# Patient Record
Sex: FEMALE | Race: WHITE | NOT HISPANIC OR LATINO | Employment: UNEMPLOYED | URBAN - METROPOLITAN AREA
[De-identification: names, ages, dates, MRNs, and addresses within clinical notes are randomized per-mention and may not be internally consistent; named-entity substitution may affect disease eponyms.]

---

## 2017-01-11 ENCOUNTER — APPOINTMENT (OUTPATIENT)
Dept: LAB | Facility: CLINIC | Age: 57
End: 2017-01-11
Payer: COMMERCIAL

## 2017-01-11 DIAGNOSIS — E78.5 HYPERLIPIDEMIA: ICD-10-CM

## 2017-01-11 DIAGNOSIS — E55.9 VITAMIN D DEFICIENCY: ICD-10-CM

## 2017-01-11 LAB
25(OH)D3 SERPL-MCNC: 31 NG/ML (ref 30–100)
ALBUMIN SERPL BCP-MCNC: 3.5 G/DL (ref 3.5–5)
ALP SERPL-CCNC: 88 U/L (ref 46–116)
ALT SERPL W P-5'-P-CCNC: 42 U/L (ref 12–78)
ANION GAP SERPL CALCULATED.3IONS-SCNC: 7 MMOL/L (ref 4–13)
AST SERPL W P-5'-P-CCNC: 21 U/L (ref 5–45)
BILIRUB SERPL-MCNC: 0.3 MG/DL (ref 0.2–1)
BUN SERPL-MCNC: 18 MG/DL (ref 5–25)
CALCIUM SERPL-MCNC: 8.6 MG/DL (ref 8.3–10.1)
CHLORIDE SERPL-SCNC: 106 MMOL/L (ref 100–108)
CHOLEST SERPL-MCNC: 208 MG/DL (ref 50–200)
CO2 SERPL-SCNC: 29 MMOL/L (ref 21–32)
CREAT SERPL-MCNC: 0.68 MG/DL (ref 0.6–1.3)
GFR SERPL CREATININE-BSD FRML MDRD: >60 ML/MIN/1.73SQ M
GLUCOSE SERPL-MCNC: 97 MG/DL (ref 65–140)
HDLC SERPL-MCNC: 77 MG/DL (ref 40–60)
LDLC SERPL CALC-MCNC: 112 MG/DL (ref 0–100)
POTASSIUM SERPL-SCNC: 3.9 MMOL/L (ref 3.5–5.3)
PROT SERPL-MCNC: 7.4 G/DL (ref 6.4–8.2)
SODIUM SERPL-SCNC: 142 MMOL/L (ref 136–145)
TRIGL SERPL-MCNC: 97 MG/DL

## 2017-01-11 PROCEDURE — 80053 COMPREHEN METABOLIC PANEL: CPT

## 2017-01-11 PROCEDURE — 80061 LIPID PANEL: CPT

## 2017-01-11 PROCEDURE — 36415 COLL VENOUS BLD VENIPUNCTURE: CPT

## 2017-01-11 PROCEDURE — 82306 VITAMIN D 25 HYDROXY: CPT

## 2017-01-13 ENCOUNTER — GENERIC CONVERSION - ENCOUNTER (OUTPATIENT)
Dept: OTHER | Facility: OTHER | Age: 57
End: 2017-01-13

## 2017-02-23 DIAGNOSIS — Z12.31 ENCOUNTER FOR SCREENING MAMMOGRAM FOR MALIGNANT NEOPLASM OF BREAST: ICD-10-CM

## 2017-03-10 ENCOUNTER — HOSPITAL ENCOUNTER (OUTPATIENT)
Dept: RADIOLOGY | Age: 57
Discharge: HOME | End: 2017-03-10
Payer: COMMERCIAL

## 2017-03-10 DIAGNOSIS — Z12.31 ENCOUNTER FOR SCREENING MAMMOGRAM FOR MALIGNANT NEOPLASM OF BREAST: ICD-10-CM

## 2017-03-10 PROCEDURE — G0202 SCR MAMMO BI INCL CAD: HCPCS

## 2017-09-11 ENCOUNTER — ALLSCRIPTS OFFICE VISIT (OUTPATIENT)
Dept: OTHER | Facility: OTHER | Age: 57
End: 2017-09-11

## 2017-09-18 ENCOUNTER — GENERIC CONVERSION - ENCOUNTER (OUTPATIENT)
Dept: OTHER | Facility: OTHER | Age: 57
End: 2017-09-18

## 2017-10-16 ENCOUNTER — ALLSCRIPTS OFFICE VISIT (OUTPATIENT)
Dept: OTHER | Facility: OTHER | Age: 57
End: 2017-10-16

## 2017-12-01 DIAGNOSIS — I10 ESSENTIAL (PRIMARY) HYPERTENSION: ICD-10-CM

## 2017-12-01 DIAGNOSIS — E55.9 VITAMIN D DEFICIENCY: ICD-10-CM

## 2017-12-01 DIAGNOSIS — E78.5 HYPERLIPIDEMIA: ICD-10-CM

## 2017-12-11 ENCOUNTER — APPOINTMENT (OUTPATIENT)
Dept: LAB | Facility: CLINIC | Age: 57
End: 2017-12-11
Payer: COMMERCIAL

## 2017-12-11 ENCOUNTER — GENERIC CONVERSION - ENCOUNTER (OUTPATIENT)
Dept: OTHER | Facility: OTHER | Age: 57
End: 2017-12-11

## 2017-12-11 DIAGNOSIS — I10 ESSENTIAL (PRIMARY) HYPERTENSION: ICD-10-CM

## 2017-12-11 DIAGNOSIS — E55.9 VITAMIN D DEFICIENCY: ICD-10-CM

## 2017-12-11 DIAGNOSIS — E78.5 HYPERLIPIDEMIA: ICD-10-CM

## 2017-12-11 LAB
25(OH)D3 SERPL-MCNC: 33.7 NG/ML (ref 30–100)
ALBUMIN SERPL BCP-MCNC: 3.4 G/DL (ref 3.5–5)
ALP SERPL-CCNC: 83 U/L (ref 46–116)
ALT SERPL W P-5'-P-CCNC: 38 U/L (ref 12–78)
ANION GAP SERPL CALCULATED.3IONS-SCNC: 8 MMOL/L (ref 4–13)
AST SERPL W P-5'-P-CCNC: 36 U/L (ref 5–45)
BASOPHILS # BLD AUTO: 0.03 THOUSANDS/ΜL (ref 0–0.1)
BASOPHILS NFR BLD AUTO: 0 % (ref 0–1)
BILIRUB SERPL-MCNC: 0.5 MG/DL (ref 0.2–1)
BUN SERPL-MCNC: 16 MG/DL (ref 5–25)
CALCIUM SERPL-MCNC: 9.3 MG/DL (ref 8.3–10.1)
CHLORIDE SERPL-SCNC: 103 MMOL/L (ref 100–108)
CHOLEST SERPL-MCNC: 181 MG/DL (ref 50–200)
CO2 SERPL-SCNC: 27 MMOL/L (ref 21–32)
CREAT SERPL-MCNC: 0.64 MG/DL (ref 0.6–1.3)
EOSINOPHIL # BLD AUTO: 0.13 THOUSAND/ΜL (ref 0–0.61)
EOSINOPHIL NFR BLD AUTO: 2 % (ref 0–6)
ERYTHROCYTE [DISTWIDTH] IN BLOOD BY AUTOMATED COUNT: 14.2 % (ref 11.6–15.1)
GFR SERPL CREATININE-BSD FRML MDRD: 99 ML/MIN/1.73SQ M
GLUCOSE P FAST SERPL-MCNC: 97 MG/DL (ref 65–99)
HCT VFR BLD AUTO: 41.7 % (ref 34.8–46.1)
HDLC SERPL-MCNC: 71 MG/DL (ref 40–60)
HGB BLD-MCNC: 13.7 G/DL (ref 11.5–15.4)
LDLC SERPL CALC-MCNC: 90 MG/DL (ref 0–100)
LYMPHOCYTES # BLD AUTO: 1.54 THOUSANDS/ΜL (ref 0.6–4.47)
LYMPHOCYTES NFR BLD AUTO: 21 % (ref 14–44)
MCH RBC QN AUTO: 28.6 PG (ref 26.8–34.3)
MCHC RBC AUTO-ENTMCNC: 32.9 G/DL (ref 31.4–37.4)
MCV RBC AUTO: 87 FL (ref 82–98)
MONOCYTES # BLD AUTO: 0.49 THOUSAND/ΜL (ref 0.17–1.22)
MONOCYTES NFR BLD AUTO: 7 % (ref 4–12)
NEUTROPHILS # BLD AUTO: 5.27 THOUSANDS/ΜL (ref 1.85–7.62)
NEUTS SEG NFR BLD AUTO: 70 % (ref 43–75)
PLATELET # BLD AUTO: 298 THOUSANDS/UL (ref 149–390)
PMV BLD AUTO: 10.1 FL (ref 8.9–12.7)
POTASSIUM SERPL-SCNC: 4 MMOL/L (ref 3.5–5.3)
PROT SERPL-MCNC: 7.2 G/DL (ref 6.4–8.2)
RBC # BLD AUTO: 4.79 MILLION/UL (ref 3.81–5.12)
SODIUM SERPL-SCNC: 138 MMOL/L (ref 136–145)
TRIGL SERPL-MCNC: 100 MG/DL
TSH SERPL DL<=0.05 MIU/L-ACNC: 1.44 UIU/ML (ref 0.36–3.74)
WBC # BLD AUTO: 7.46 THOUSAND/UL (ref 4.31–10.16)

## 2017-12-11 PROCEDURE — 80053 COMPREHEN METABOLIC PANEL: CPT

## 2017-12-11 PROCEDURE — 80061 LIPID PANEL: CPT

## 2017-12-11 PROCEDURE — 84443 ASSAY THYROID STIM HORMONE: CPT

## 2017-12-11 PROCEDURE — 36415 COLL VENOUS BLD VENIPUNCTURE: CPT

## 2017-12-11 PROCEDURE — 82306 VITAMIN D 25 HYDROXY: CPT

## 2017-12-11 PROCEDURE — 85025 COMPLETE CBC W/AUTO DIFF WBC: CPT

## 2018-01-10 NOTE — PROGRESS NOTES
Assessment    1  Acid reflux disease (530 81) (K21 9)   2  Hyperlipidemia (272 4) (E78 5)   3  Hypertension (401 9) (I10)   4  Obesity (278 00) (E66 9)   5  Vitamin D deficiency (268 9) (E55 9)   6  Encounter for preventive health examination (V70 0) (Z00 00)    Plan  Acid reflux disease    · *1 - 209 [address] Physician Referral  Consult   Status: Active  Requested for: 37QFW1659  Care Summary provided  : Yes  Hyperlipidemia    · (1) COMPREHENSIVE METABOLIC PANEL; Status:Active; Requested OGM:22PHX4483;    · (1) LIPID PANEL, FASTING; Status:Active; Requested [id number];   Obesity    · Begin or continue regular aerobic exercise  Gradually work up to at least 3 sessions of 30  minutes of exercise a week ; Status:Complete;   Done: 23WBZ7343   · Diets that are low in carbohydrates and high in protein are very popular for weight loss ;  Status:Complete;   Done: 56KIP0281   · Drink at least 6 glasses of clear liquids a day ; Status:Complete;   Done: 67VBB9053  Vitamin D deficiency    · (1) VITAMIN D 25-HYDROXY; Status:Active; Requested UKI:36DST9214;     Discussion/Summary  health maintenance visit Currently, she eats an adequate diet and has an adequate exercise regimen  cervical cancer screening is current Breast cancer screening: mammogram is current  Colorectal cancer screening: colorectal cancer screening is current  Osteoporosis screening: bone mineral density testing is not indicated  The immunizations are up to date  Advice and education were given regarding nutrition, aerobic exercise and weight loss  Patient discussion: discussed with the patient  1  Choking episodes, Hx of GERD  On bid PPI, still with occasional symptoms  F/u with ENT next month as scheduled  She recalls having normal barium study years ago, never had EGD  Refer to GI   2  HTN  BP controlled on lisinopril  3  Hyperlipidemia  Lipids at goal, maintain on statin  4  Obesity   No weight changed, discussed portion control, limit carbs, increase protein in diet  5    Screening and vaccinations updated  Follow up in 6 months or prn  The patient was counseled regarding diagnostic results, risk factor reductions, impressions  Chief Complaint  physical      History of Present Illness  HM, Adult Female: The patient is being seen for a health maintenance evaluation  General Health: The patient's health since the last visit is described as good  She denies vision problems  Immunizations status: up to date  Lifestyle:  She consumes a diverse and healthy diet  She has weight concerns  She exercises regularly  Reproductive health:  she reports normal menses  Screening: Cervical cancer screening includes a pap smear performed last year  Breast cancer screening includes a mammogram performed 2016  Colorectal cancer screening includes a colonoscopy performed within the past ten years  Metabolic screening includes lipid profile performed , glucose screening performed  and thyroid function test performed   Cardiovascular risk factors: hypertension and obesity, but no sedentary lifestyle  HPI: Mrs Fabien Gongora feels well  She still gets her regular menstrual periods, no cramping just bloating  She reports having occasional choking episodes, can occur at any time, with fluids or solid foods  She feels it goes down "the wrong pipe" when she drinks water  She has been taking Nexium for a few years, feels it does not really help  She sees ENT  She did not really try to follow the reflux diet since "it is everything "        Obesity (Follow-Up): The patient is being seen for follow-up of obesity  The patient reports no change in the condition  Interval symptoms:  denies poor eating habits, denies dyspnea and denies back pain  Associated symptoms: no constipation  The patient is not currently on medication for this problem  Disease management:  the patient is not doing well with her goals   Diet plan: low calorie diet, low fat diet and low carbohydrate diet  Hyperlipidemia (Follow-Up): The patient states her hyperlipidemia has been under good control since the last visit  Comorbid Illnesses: hypertension  Symptoms: The patient is currently asymptomatic  Associated symptoms include no focal neurologic deficits  Medications: the patient is not adherent with her medication regimen  Medication(s): a statin  The patient is doing well with her hyperlipidemia goals  Hypertension (Follow-Up): The patient states she has been doing well with her blood pressure control since the last visit  Symptoms: The patient is currently asymptomatic  Associated symptoms include no headache  Home monitoring: The patient is not checking blood pressure at home  Medications: the patient is adherent with her medication regimen  She denies medication side effects  Medication(s): an ACE inhibitor  Disease Management: the patient is doing well with her blood pressure goals  Review of Systems    Constitutional: not feeling tired  Eyes: no eyesight problems  ENT: no nasal discharge  Cardiovascular: no chest pain, no palpitations and no lower extremity edema  Respiratory: no shortness of breath and no cough  Gastrointestinal: no abdominal pain and no constipation  Genitourinary: no dysuria and no incontinence  Musculoskeletal: no arthralgias  Integumentary: no rashes  Neurological: no headache and no dizziness  Psychiatric: no sleep disturbances  no feelings of weakness      Active Problems    1  Acid reflux disease (530 81) (K21 9)   2  Breast Surgery Reduction Procedure Bilateral   3  Cervical cancer screening (V76 2) (Z12 4)   4  Hyperlipidemia (272 4) (E78 5)   5  Hypertension (401 9) (I10)   6  Hypertrophy of breast (611 1) (N62)   7  History of Influenza A (487 1) (J10 1)   8  Need for shingles vaccine (V04 89) (Z23)   9  Obesity (278 00) (E66 9)   10   Other screening mammogram (V76 12) (Z12 31)   11  Preoperative clearance (V72 84) (Z01 818)   12  Vitamin D deficiency (268 9) (E55 9)   13  Well female exam with routine gynecological exam ( 31) (Z01 419)    Past Medical History    · History of Body aches (780 96) (R52)   · History of backache (V13 59) (Z87 39)   · History of fever (V13 89) (V74 140)   · History of hypokalemia (V12 29) (Z86 39)   · History of Influenza A (487 1) (J10 1)   · No pertinent past medical history   · History of Shoulder pain, left (719 41) (M25 512)    The active problems and past medical history were reviewed and updated today  Surgical History    · Breast Surgery Reduction Procedure Bilateral   · History of  Section    Family History  Mother    · Family history of CABG   · Family history of Family Health Status Of Mother -    · Family history of Pure Hypercholesterolemia   · Family history of Renal Failure  Father    · Family history of Family Health Status Of Father - Alive   · Family history of Hypertension (V17 49)  Brother    · Family history of Pure Hypercholesterolemia    Social History    · Being A Social Drinker   · Former smoker (V15 82) (A69 411)   · REMOTE AT AGE 16YRS   · Minimum alcohol consumption   · Never a smoker   · Rarely consumes alcohol (V49 89) (Z78 9)  The social history was reviewed and is unchanged  Current Meds   1  Atorvastatin Calcium 20 MG Oral Tablet; Take 1 tablet daily; Therapy: 34XXK8475 to (Evaluate:2016)  Requested for: 18AFU0523; Last   Rx:54Xkg1568 Ordered   2  Lisinopril 10 MG Oral Tablet; take 1 tablet by mouth every day; Therapy: 33RPQ4833 to (Evaluate:2017)  Requested for: 44Hti4243; Last   Rx:13Rzu6324 Ordered   3  NexIUM 40 MG Oral Capsule Delayed Release; take 1 capsule daily; Therapy: 93FNV4603 to (Evaluate:2015) Recorded   4  Vitamin D 1000 UNIT Oral Tablet; TAKE 1 TABLET ORALLY DAILY (SUPPLEMENT);    Therapy: 45DSZ3281 to Recorded    The medication list was reviewed and updated today  Allergies    1  No Known Drug Allergies    Vitals   Recorded: 84IGR0375 78:98VH   Systolic 314   Diastolic 60   Heart Rate 72   Respiration 18   O2 Saturation 98   Height 5 ft 0 8 in   Weight 166 lb 6 08 oz   BMI Calculated 31 64   BSA Calculated 1 74     Physical Exam    Constitutional   General appearance: No acute distress, well appearing and well nourished  comfortable, obese and clothing appropriate  Head and Face   Head and face: Normal     Eyes   Pupils and irises: Equal, round, reactive to light  Ears, Nose, Mouth, and Throat   External inspection of ears and nose: Normal     Otoscopic examination: Tympanic membranes translucent with normal light reflex  Canals patent without erythema  Oropharynx: Normal with no erythema, edema, exudate or lesions  Neck   Neck: Supple, symmetric, trachea midline, no masses  Pulmonary   Respiratory effort: No increased work of breathing or signs of respiratory distress  Auscultation of lungs: Clear to auscultation  Cardiovascular   Auscultation of heart: Normal rate and rhythm, normal S1 and S2, no murmurs  Examination of extremities for edema and/or varicosities: Normal     Abdomen   Abdomen: Non-tender, no masses  Musculoskeletal   Gait and station: Normal     Skin   Skin and subcutaneous tissue: Normal without rashes or lesions  Neurologic   Cortical function: Normal mental status  Psychiatric   Orientation to person, place, and time: Normal     Mood and affect: Normal        Results/Data  PHQ-2 Adult Depression Screening 38Wdf1075 01:12PM User, s     Test Name Result Flag Reference   PHQ-2 Adult Depression Score 0     Over the last two weeks, how often have you been bothered by any of the following problems?   Little interest or pleasure in doing things: Not at all - 0  Feeling down, depressed, or hopeless: Not at all - 0   PHQ-2 Adult Depression Screening Negative         Health Management  Cervical cancer screening (every) THINPREP PAP; every 3 years; Last 11Apr2016; Next Due: 82Afy8415; Active  Other screening mammogram   Digital Bilateral Screening Mammogram With CAD; every 1 year; Last 79MBO9369; Next  Due: 51FLT1799; Active  Well female exam with routine gynecological exam   (1) THIN PREP PAP WITH IMAGING; every 3 years; Last 11Apr2016; Next Due:  72Ygh2315; Active  BREAST EXAM; every 1 year; Last 11Apr2016; Next Due: 03Qel0685; Active  COLONOSCOPY; every 10 years; Next Due: 42Vfb1978;  Overdue    Signatures   Electronically signed by : Rogelio Robledo MD; Jul 26 2016  3:11PM EST                       (Author)

## 2018-01-11 NOTE — PROGRESS NOTES
Chief Complaint  pt here for flu vaccine      Active Problems    1  Acid reflux disease (530 81) (K21 9)   2  Breast Surgery Reduction Procedure Bilateral   3  Cervical cancer screening (V76 2) (Z12 4)   4  Globus sensation (306 4) (F45 8)   5  Hyperlipidemia (272 4) (E78 5)   6  Hypertension (401 9) (I10)   7  Hypertrophy of breast (611 1) (N62)   8  History of Influenza A (487 1) (J10 1)   9  Need for shingles vaccine (V04 89) (Z23)   10  Obesity (278 00) (E66 9)   11  Other screening mammogram (V76 12) (Z12 31)   12  Preoperative clearance (V72 84) (Z01 818)   13  Viral upper respiratory tract infection with cough (465 9) (J06 9,B97 89)   14  Vitamin D deficiency (268 9) (E55 9)   15  Well female exam with routine gynecological exam (V72 31) (Z01 419)    Current Meds   1  Atorvastatin Calcium 20 MG Oral Tablet; Take 1 tablet daily; Therapy: 88CSS5431 to (Evaluate:16Oct2016)  Requested for: 30MDT1448; Last   Rx:34Roy7772 Ordered   2  Benzonatate 100 MG Oral Capsule; TAKE 1 CAPSULE EVERY 8 HOURS AS NEEDED; Therapy: 03HLW4822 to (Evaluate:15Oct2016)  Requested for: 35ATF9440; Last   Rx:05Oct2016 Ordered   3  Chloraseptic 6-10 MG Mouth/Throat Lozenge; USE AS DIRECTED ON PACKAGE; Therapy: 04EWO1112 to (Evaluate:11Oct2016); Last Rx:81Vbj1046 Ordered   4  Esomeprazole Magnesium 40 MG Oral Capsule Delayed Release (NexIUM); TAKE 1   CAPSULE TWICE DAILY 30 MINUTES PRIOR TO BREAKFAST AND DINNER; Therapy: 16XKF8446 to (Evaluate:04Nov2016); Last Rx:03Yan1054 Ordered   5  Esomeprazole Magnesium 40 MG Oral Capsule Delayed Release (NexIUM); TAKE 1   CAPSULE TWICE DAILY; Therapy: 83Rhq2963 to (Evaluate:07Fqa3321)  Requested for: 98Bzr5115; Last   Rx:80Lqd4123 Ordered   6  Lisinopril 10 MG Oral Tablet; take 1 tablet by mouth every day; Therapy: 00HQB1293 to (Evaluate:21Jan2017)  Requested for: 19Izn7260; Last   Rx:38Ekk7731 Ordered   7   NexIUM 40 MG Oral Capsule Delayed Release (Esomeprazole Magnesium); take 1 capsule daily; Therapy: 93NLG1687 to (Evaluate:25Jun2015) Recorded   8  Vitamin D 1000 UNIT Oral Tablet; TAKE 1 TABLET ORALLY DAILY (SUPPLEMENT); Therapy: 57SLQ7284 to Recorded    Allergies    1   No Known Drug Allergies    Signatures   Electronically signed by : Abimael Aviles MD; Oct 25 2016  4:07PM EST                       (Author)

## 2018-01-13 VITALS
BODY MASS INDEX: 32.85 KG/M2 | OXYGEN SATURATION: 96 % | TEMPERATURE: 97.7 F | RESPIRATION RATE: 16 BRPM | SYSTOLIC BLOOD PRESSURE: 106 MMHG | HEART RATE: 63 BPM | WEIGHT: 174 LBS | DIASTOLIC BLOOD PRESSURE: 74 MMHG | HEIGHT: 61 IN

## 2018-01-15 ENCOUNTER — GENERIC CONVERSION - ENCOUNTER (OUTPATIENT)
Dept: OTHER | Facility: OTHER | Age: 58
End: 2018-01-15

## 2018-01-15 NOTE — RESULT NOTES
Verified Results  (1) COMPREHENSIVE METABOLIC PANEL 09QXE8445 98:74MU Ruby Macias    Order Number: [id number]     Test Name Result Flag Reference   GLUCOSE,RANDM 97 mg/dL     If the patient is fasting, the ADA then defines impaired fasting glucose as > 100 mg/dL and diabetes as > or equal to 123 mg/dL  SODIUM 142 mmol/L  136-145   POTASSIUM 3 9 mmol/L  3 5-5 3   CHLORIDE 106 mmol/L  100-108   CARBON DIOXIDE 29 mmol/L  21-32   ANION GAP (CALC) 7 mmol/L  4-13   BLOOD UREA NITROGEN 18 mg/dL  5-25   CREATININE 0 68 mg/dL  0 60-1 30   Standardized to IDMS reference method   CALCIUM 8 6 mg/dL  8 3-10 1   BILI, TOTAL 0 30 mg/dL  0 20-1 00   ALK PHOSPHATAS 88 U/L     ALT (SGPT) 42 U/L  12-78   AST(SGOT) 21 U/L  5-45   ALBUMIN 3 5 g/dL  3 5-5 0   TOTAL PROTEIN 7 4 g/dL  6 4-8 2   eGFR Non-African American      >60 0 ml/min/1 73sq m   - Patient Instructions: This is a fasting blood test  Water,black tea or black  coffee only after 9:00pm the night before test Drink 2 glasses of water the morning of test   National Kidney Disease Education Program recommendations are as follows:  GFR calculation is accurate only with a steady state creatinine  Chronic Kidney disease less than 60 ml/min/1 73 sq  meters  Kidney failure less than 15 ml/min/1 73 sq  meters  (1) LIPID PANEL, FASTING 88PUW3459 10:[facility]AM [facility] Order Number: [id number]     Test Name Result Flag Reference   CHOLESTEROL 208 mg/dL H    HDL,DIRECT 77 mg/dL H 40-60   Specimen collection should occur prior to Metamizole administration due to the potential for falsely depressed results  LDL CHOLESTEROL CALCULATED 112 mg/dL H 0-100   - Patient Instructions: This is a fasting blood test  Water,black tea or black  coffee only after 9:00pm the night before test   Drink 2 glasses of water the morning of test     - Patient Instructions:  This is a fasting blood test  Water,black tea or black  coffee only after 9:00pm the night before test Drink 2 glasses of water the morning of test   Triglyceride:         Normal              <150 mg/dl       Borderline High    150-199 mg/dl       High               200-499 mg/dl       Very High          >499 mg/dl  Cholesterol:         Desirable        <200 mg/dl      Borderline High  200-239 mg/dl      High             >239 mg/dl  HDL Cholesterol:        High    >59 mg/dL      Low     <41 mg/dL  LDL CALCULATED:    This screening LDL is a calculated result  It does not have the accuracy of the Direct Measured LDL in the monitoring of patients with hyperlipidemia and/or statin therapy  Direct Measure LDL (VJE026) must be ordered separately in these patients  TRIGLYCERIDES 97 mg/dL  <=150   Specimen collection should occur prior to N-Acetylcysteine or Metamizole administration due to the potential for falsely depressed results  (1) VITAMIN D 25-HYDROXY 45MME6469 10:22AM Apurva Hurd Order Number: [id number]     Test Name Result Flag Reference   VIT D 25-HYDROX 31 0 ng/mL  30 0-100 0   This assay is a certified procedure of the CDC Vitamin D Standardization Certification Program (VDSCP)     Deficiency <20ng/ml   Insufficiency 20-30ng/ml   Sufficient  ng/ml     *Patients undergoing fluorescein dye angiography may retain small amounts of fluorescein in the body for 48-72 hours post procedure  Samples containing fluorescein can produce falsely elevated Vitamin D values  If the patient had this procedure, a specimen should be resubmitted post fluorescein clearance

## 2018-01-16 NOTE — PROGRESS NOTES
Chief Complaint  Patient presented in office for BP and Zostavax vaccine  Active Problems    1  Acid reflux disease (530 81) (K21 9)   2  Breast Surgery Reduction Procedure Bilateral   3  Cervical cancer screening (V76 2) (Z12 4)   4  Hyperlipidemia (272 4) (E78 5)   5  Hypertension (401 9) (I10)   6  Hypertrophy of breast (611 1) (N62)   7  Hypokalemia (276 8) (E87 6)   8  History of Influenza A (487 1) (J10 1)   9  Obesity (278 00) (E66 9)   10  Other screening mammogram (V76 12) (Z12 31)   11  Preoperative clearance (V72 84) (Z01 818)   12  Vitamin D deficiency (268 9) (E55 9)    Current Meds   1  Atorvastatin Calcium 20 MG Oral Tablet; Take 1 tablet daily; Therapy: 20WJW6110 to (Dmitry Parker)  Requested for: 66Nww3394; Last   Rx:25Ihd7042 Ordered   2  Lisinopril 10 MG Oral Tablet; take 1 tablet by mouth every day; Therapy: 01NGX2279 to (Evaluate:06Ntz8357)  Requested for: 63VBI7699; Last   Rx:81Yyq6889 Ordered   3  NexIUM 40 MG Oral Capsule Delayed Release; take 1 capsule daily; Therapy: 31MCJ7764 to (Evaluate:25Jun2015) Recorded   4  Vitamin D 1000 UNIT Oral Tablet; TAKE 1 TABLET ORALLY DAILY (SUPPLEMENT); Therapy: 56LJC0065 to Recorded    Allergies    1   No Known Drug Allergies    Vitals  Signs [Data Includes: Current Encounter]    Systolic: 623, LUE, Sitting  Diastolic: 78, LUE, Sitting    Plan  Need for shingles vaccine    · Zostavax 71873 UNT/0 65ML Subcutaneous Solution Reconstituted    Future Appointments    Date/Time Provider Specialty Site   07/26/2016 12:00 PM Baylee Kim MD Internal Medicine [facility] INTERNAL MED   05/85/7376 18:52 AM Lillian German DO Obstetrics/Gynecology [address] [facility]     Signatures   Electronically signed by : Kate Prado, ; Feb 10 2016 11:50AM EST                       (Author)    Electronically signed by : Walker Paget, MD; Feb 10 2016  1:35PM EST                       (Author)

## 2018-01-16 NOTE — RESULT NOTES
Verified Results  (1) COMPREHENSIVE METABOLIC PANEL 45NOP9019 00:26ZL Jeni KEMP Order Number: [id number]  TW Order Number: [id number] Order Number: [id number] Order Number: [id number]     Test Name Result Flag Reference   GLUCOSE,RANDM 116 mg/dL     If the patient is fasting, the ADA then defines impaired fasting glucose as > 100 mg/dL and diabetes as > or equal to 123 mg/dL  SODIUM 141 mmol/L  136-145   POTASSIUM 4 0 mmol/L  3 5-5 3   CHLORIDE 106 mmol/L  100-108   CARBON DIOXIDE 28 mmol/L  21-32   ANION GAP (CALC) 7 mmol/L  4-13   BLOOD UREA NITROGEN 15 mg/dL  5-25   CREATININE 0 84 mg/dL  0 60-1 30   Standardized to IDMS reference method   CALCIUM 8 6 mg/dL  8 3-10 1   BILI, TOTAL 0 40 mg/dL  0 20-1 00   ALK PHOSPHATAS 75 U/L     ALT (SGPT) 26 U/L  12-78   AST(SGOT) 15 U/L  5-45   ALBUMIN 3 4 g/dL L 3 5-5 0   TOTAL PROTEIN 6 8 g/dL  6 4-8 2   eGFR Non-African American      >60 0 ml/min/1 73sq [facility] Disease Education Program recommendations are as follows:  GFR calculation is accurate only with a steady state creatinine  Chronic Kidney disease less than 60 ml/min/1 73 sq  meters  Kidney failure less than 15 ml/min/1 73 sq  meters  (1) LIPID PANEL, FASTING 63Ipc0019 08:09AM Jeni Sherman    Order Number: [id number]  TW Order Number: [id number] Order Number: [id number] Order Number: [id number]     Test Name Result Flag Reference   CHOLESTEROL 159 mg/dL     HDL,DIRECT 58 mg/dL  40-60   Specimen collection should occur prior to Metamizole administration due to the potential for falsely depressed results     LDL CHOLESTEROL CALCULATED 83 mg/dL  0-100   Triglyceride:         Normal              <150 mg/dl       Borderline High    150-199 mg/dl       High               200-499 mg/dl       Very High          >499 mg/dl  Cholesterol:         Desirable        <200 mg/dl      Borderline High  200-239 mg/dl      High             >239 mg/dl  HDL Cholesterol:        High    >59 mg/dL      Low     <41 mg/dL  LDL CALCULATED:    This screening LDL is a calculated result  It does not have the accuracy of the Direct Measured LDL in the monitoring of patients with hyperlipidemia and/or statin therapy  Direct Measure LDL (FGR054) must be ordered separately in these patients  TRIGLYCERIDES 88 mg/dL  <=150   Specimen collection should occur prior to N-Acetylcysteine or Metamizole administration due to the potential for falsely depressed results  (1) TSH 26Jul2016 08:09AM Harrison Daleytrent    Order Number: [id number]  TW Order Number: [id number] Order Number: [id number] Order Number: [id number]     Test Name Result Flag Reference   TSH 1 161 uIU/mL  0 358-3 740   Patients undergoing fluorescein dye angiography may retain small amounts of fluorescein in the body for 48-72 hours post procedure  Samples containing fluorescein can produce falsely depressed TSH values  If the patient had this procedure,a specimen should be resubmitted post fluorescein clearance  The recommended reference ranges for TSH during pregnancy are as follows:  First trimester 0 1 to 2 5 uIU/mL  Second trimester  0 2 to 3 0 uIU/mL  Third trimester 0 3 to 3 0 uIU/m     (1) CBC/PLT/DIFF 26Jul2016 08:09AM Dann Boeck    Order Number: [id number]  TW Order Number: [id number]     Test Name Result Flag Reference   WBC COUNT 5 53 Thousand/uL  4 31-10 16   RBC COUNT 4 88 Million/uL  3 81-5 12   HEMOGLOBIN 14 2 g/dL  11 5-15 4   HEMATOCRIT 42 7 %  34 8-46  1   MCV 88 fL  82-98   MCH 29 1 pg  26 8-34 3   MCHC 33 3 g/dL  31 4-37 4   RDW 13 6 %  11 6-15 1   MPV 10 1 fL  8 9-12 7   PLATELET COUNT 674 Thousands/uL  149-390   NEUTROPHILS RELATIVE PERCENT 66 %  43-75   LYMPHOCYTES RELATIVE PERCENT 22 %  14-44   MONOCYTES RELATIVE PERCENT 9 %  4-12   EOSINOPHILS RELATIVE PERCENT 2 %  0-6   BASOPHILS RELATIVE PERCENT 1 %  0-1   NEUTROPHILS ABSOLUTE COUNT 3 67 Thousands/? L 1  85-7 62   LYMPHOCYTES ABSOLUTE COUNT 1 19 Thousands/?L  0 60-4 47   MONOCYTES ABSOLUTE COUNT 0 52 Thousand/?L  0 17-1 22   EOSINOPHILS ABSOLUTE COUNT 0 12 Thousand/?L  0 00-0 61   BASOPHILS ABSOLUTE COUNT 0 03 Thousands/?L  0 00-0 10     (1) VITAMIN D 25-HYDROXY 92Kdg0791 08:09AM David Song    Order Number: [id number]     Test Name Result Flag Reference   VIT D 25-HYDROX 42 1 ng/mL  30 0-100 0   This assay is a certified procedure of the CDC Vitamin D Standardization Certification Program (VDSCP)     Deficiency <20ng/ml   Insufficiency 20-30ng/ml   Sufficient  ng/ml     *Patients undergoing fluorescein dye angiography may retain small amounts of fluorescein in the body for 48-72 hours post procedure  Samples containing fluorescein can produce falsely elevated Vitamin D values  If the patient had this procedure, a specimen should be resubmitted post fluorescein clearance         Discussion/Summary   Results of all your lab work are normal

## 2018-01-16 NOTE — RESULT NOTES
Verified Results  (1) THIN PREP PAP WITH IMAGING [id number] 03:12WX Misty Grief     Test Name Result Flag Reference   LAB AP CASE REPORT (Report)     Gynecologic Cytology Report            Case: [id number]                  Authorizing Provider: Christopher Soto DO     Collected:      04/11/2016 1035        First Screen:     MARLEN Paige    Received:      04/14/2016 1035        Specimen:  LIQUID-BASED PAP, SCREENING, Endocervical   HPV HIGH RISK RESULT (Report)     HPV, High Risk: HPV NEG, HPV16 NEG, HPV18 NEG      Other High Risk HPV Negative, HPV 16 Negative, HPV 18 Negative  HPV types: 16,18,31,33,35,39,45,51,52,56,58,59,66 and 68 DNA are undetectable or below the pre-set threshold  [employer]'s FDA approved Regla 4800 is utilized with strict adherence to the 's instruction  manual to test for the presence of High-Risk HPV DNA, as well as HPV 16 and HPV 18  This instrument  has been validated by our laboratory and/or by the   A negative result does not preclude the presence of HPV infection because results depend on adequate  specimen collection, absence of inhibitors and sufficient DNA to be detected  Additionally, HPV negative  results are not intended to prevent women from proceeding to colposcopy if clinically warranted  Positive HPV test results indicate the presence of any one or more of the high risk types, but since patients  are often co-infected with low-risk types it does not rule out the presence of low-risk types in patients  with mixed infections  LAB AP GYN PRIMARY INTERPRETATION      Negative for intraepithelial lesion or malignancy   LAB AP GYN SPECIMEN ADEQUACY      Satisfactory for evaluation  Absence of endocervical/transformation zone component     LAB AP GYN ADDITIONAL INFORMATION (Report)     "[employer]'s FDA approved ,  and ThinPrep Imaging System are   utilized with strict adherence to the 's instruction manual to   prepare gynecologic and non-gynecologic cytology specimens for the   production of ThinPrep slides as well as for gynecologic ThinPrep imaging  These processes have been validated by our laboratory and/or by the     The Pap test is not a diagnostic procedure and should not be used as the   sole means to detect cervical cancer  It is only a screening procedure to   aid in the detection of cervical cancer and its precursors  Both   false-negative and false-positive results have been experienced  Your   patient's test result should be interpreted in this context together with   the history and clinical findings  LAB AP LMP not given     not given       Plan  Well female exam with routine gynecological exam    · (1) THIN PREP PAP WITH IMAGING ; every 3 years;  Last 11Apr2016; Next 11Apr2019;  Status:Active

## 2018-01-16 NOTE — MISCELLANEOUS
Message  Patient called the GI lab and cancelled her EGD for 12/1/2016 with no reason provided  I called and left her a message  Active Problems    1  Acid reflux disease (530 81) (K21 9)   2  Breast Surgery Reduction Procedure Bilateral   3  Cervical cancer screening (V76 2) (Z12 4)   4  Globus sensation (306 4) (F45 8)   5  Hyperlipidemia (272 4) (E78 5)   6  Hypertension (401 9) (I10)   7  Hypertrophy of breast (611 1) (N62)   8  History of Influenza A (487 1) (J10 1)   9  Need for shingles vaccine (V04 89) (Z23)   10  Obesity (278 00) (E66 9)   11  Other screening mammogram (V76 12) (Z12 31)   12  Preoperative clearance (V72 84) (Z01 818)   13  Viral upper respiratory tract infection with cough (465 9) (J06 9,B97 89)   14  Vitamin D deficiency (268 9) (E55 9)   15  Well female exam with routine gynecological exam (V72 31) (Z01 419)    Current Meds   1  Atorvastatin Calcium 20 MG Oral Tablet; Take 1 tablet daily; Therapy: 06QIL4323 to (Evaluate:16Oct2016)  Requested for: 01RXP9603; Last   Rx:07Otz8596 Ordered   2  Benzonatate 100 MG Oral Capsule; TAKE 1 CAPSULE EVERY 8 HOURS AS NEEDED; Therapy: 39TCW4370 to (Evaluate:15Oct2016)  Requested for: 97PKY8666; Last   Rx:19Eqc6504 Ordered   3  Chloraseptic 6-10 MG Mouth/Throat Lozenge; USE AS DIRECTED ON PACKAGE; Therapy: 25QNX6961 to (Evaluate:11Oct2016); Last Rx:05Oct2016 Ordered   4  Esomeprazole Magnesium 40 MG Oral Capsule Delayed Release (NexIUM); TAKE 1   CAPSULE TWICE DAILY 30 MINUTES PRIOR TO BREAKFAST AND DINNER; Therapy: 08TMM8885 to (Evaluate:04Nov2016); Last Rx:90Rcj8980 Ordered   5  Esomeprazole Magnesium 40 MG Oral Capsule Delayed Release (NexIUM); TAKE 1   CAPSULE TWICE DAILY; Therapy: 67Zoh7152 to (Evaluate:48Six7332)  Requested for: 80Xyh4759; Last   Rx:46Yqf0165 Ordered   6  Lisinopril 10 MG Oral Tablet; take 1 tablet by mouth every day;    Therapy: 14VPN6700 to (Evaluate:21Jan2017)  Requested for: 09Pje2299; Last   Rx:11Tgs3761 Ordered   7  NexIUM 40 MG Oral Capsule Delayed Release (Esomeprazole Magnesium); take 1   capsule daily; Therapy: 74KKY0036 to (Evaluate:76Egk5866) Recorded   8  Vitamin D 1000 UNIT Oral Tablet; TAKE 1 TABLET ORALLY DAILY (SUPPLEMENT); Therapy: 95HER9247 to Recorded    Allergies    1   No Known Drug Allergies    Signatures   Electronically signed by : Pedro May, ; Oct 24 2016 12:25PM EST                       (Author)

## 2018-01-16 NOTE — PROGRESS NOTES
Chief Complaint  Patient is here to get her flu shot      Active Problems    1  Acid reflux disease (530 81) (K21 9)   2  Breast Surgery Reduction Procedure Bilateral   3  Cervical cancer screening (V76 2) (Z12 4)   4  Colon cancer screening (V76 51) (Z12 11)   5  Diaphragmatic hernia (553 3) (K44 9)   6  Gastritis (535 50) (K29 70)   7  Globus sensation (306 4) (F45 8)   8  Hyperlipidemia (272 4) (E78 5)   9  Hypertension (401 9) (I10)   10  Hypertrophy of breast (611 1) (N62)   11  Need for influenza vaccination (V04 81) (Z23)   12  Obesity (278 00) (E66 9)   13  Other screening mammogram (V76 12) (Z12 31)   14  Preoperative clearance (V72 84) (Z01 818)   15  Vitamin D deficiency (268 9) (E55 9)   16  Well female exam with routine gynecological exam (V72 31) (Z01 419)    Current Meds   1  Atorvastatin Calcium 20 MG Oral Tablet; Take 1 tablet daily; Therapy: 36VGA7543 to (77 873 135)  Requested for: 60JZB0124; Last   Rx:74Hya2048 Ordered   2  Chloraseptic 6-10 MG Mouth/Throat Lozenge; USE AS DIRECTED ON PACKAGE; Therapy: 49SPC1726 to (Evaluate:11Oct2016); Last Rx:20Tuy3451 Ordered   3  Esomeprazole Magnesium 40 MG Oral Capsule Delayed Release; TAKE 1 CAPSULE   DAILY BY MOUTH EVERY MORNING; Therapy: 01AYP5681 to (Ysabel Baird)  Requested for: 66HFF9713; Last   Rx:14Nov2016 Ordered   4  Lisinopril 10 MG Oral Tablet; take 1 tablet by mouth every day; Therapy: 26YWY6666 to (Evaluate:80Tiv7571)  Requested for: 43Mem8456; Last   Rx:94Oce3310 Ordered   5  NexIUM 40 MG Oral Capsule Delayed Release; take 1 capsule daily; Therapy: 72FYL3915 to (Evaluate:25Jun2015) Recorded   6  Phentermine HCl - 37 5 MG Oral Tablet; TAKE 1/2 TABLET TWICE DAILY; Therapy: 21Qji8847 to (Evaluate:56Kin7805); Last Rx:52Cln8230 Ordered   7  Vitamin D 1000 UNIT Oral Tablet; TAKE 1 TABLET ORALLY DAILY (SUPPLEMENT); Therapy: 05YTJ8736 to Recorded    Allergies    1   No Known Drug Allergies    Plan  Need for influenza vaccination    · Fluzone Quadrivalent Intramuscular Suspension    Signatures   Electronically signed by : Rocio Whelan MA; Oct 16 2017  2:10PM EST                       (Co-author)    Electronically signed by : Arben Arana MD; Oct 16 2017  5:34PM EST                       (Author)

## 2018-01-23 NOTE — RESULT NOTES
Verified Results  (1) COMPREHENSIVE METABOLIC PANEL 98FBE5111 88:23TL Yonathan Rose   TW Order Number: [id number]     Test Name Result Flag Reference   SODIUM 138 mmol/L  136-145   POTASSIUM 4 0 mmol/L  3 5-5 3   Slightly Hemolyzed; Results May be Affected   CHLORIDE 103 mmol/L  100-108   CARBON DIOXIDE 27 mmol/L  21-32   ANION GAP (CALC) 8 mmol/L  4-13   BLOOD UREA NITROGEN 16 mg/dL  5-25   CREATININE 0 64 mg/dL  0 60-1 30   Standardized to IDMS reference method   CALCIUM 9 3 mg/dL  8 3-10 1   BILI, TOTAL 0 50 mg/dL  0 20-1 00   ALK PHOSPHATAS 83 U/L     ALT (SGPT) 38 U/L  12-78   Specimen collection should occur prior to Sulfasalazine administration due to the potential for falsely depressed results  AST(SGOT) 36 U/L  5-45   Slightly Hemolyzed; Results May be Affected  Specimen collection should occur prior to Sulfasalazine administration due to the potential for falsely depressed results  ALBUMIN 3 4 g/dL L 3 5-5 0   TOTAL PROTEIN 7 2 g/dL  6 4-8 2   eGFR 99 ml/min/1 73sq m     National Kidney Disease Education Program recommendations are as follows:  GFR calculation is accurate only with a steady state creatinine  Chronic Kidney disease less than 60 ml/min/1 73 sq  meters  Kidney failure less than 15 ml/min/1 73 sq  meters  GLUCOSE FASTING 97 mg/dL  65-99   Specimen collection should occur prior to Sulfasalazine administration due to the potential for falsely depressed results  Specimen collection should occur prior to Sulfapyridine administration due to the potential for falsely elevated results  (1) LIPID PANEL, FASTING 23Vjc4446 10:15AM Yonathan Rose   TW Order Number: [id number]     Test Name Result Flag Reference   CHOLESTEROL 181 mg/dL     HDL,DIRECT 71 mg/dL H 40-60   Specimen collection should occur prior to Metamizole administration due to the potential for falsley depressed results     LDL CHOLESTEROL CALCULATED 90 mg/dL  0-100   Triglyceride: Normal <150 mg/dl   Borderline High 150-199 mg/dl   High 200-499 mg/dl   Very High >499 mg/dl      Cholesterol:       Desirable <200 mg/dl    Borderline High 200-239 mg/dl    High >239 mg/dl      HDL Cholesterol:       High>59 mg/dL    Low <41 mg/dL      This screening LDL is a calculated result  It does not have the accuracy of the Direct Measured LDL in the monitoring of patients with hyperlipidemia and/or statin therapy  Direct Measure LDL (IVC940) must be ordered separately in these patients  TRIGLYCERIDES 100 mg/dL  <=150   Specimen collection should occur prior to N-Acetylcysteine or Metamizole administration due to the potential for falsely depressed results  (1) TSH 59LBF6611 10:15AM [employer] Order Number: [id number]     Test Name Result Flag Reference   TSH 1 442 uIU/mL  0 358-3 740   Patients undergoing fluorescein dye angiography may retain small amounts of fluorescein in the body for 48-72 hours post procedure  Samples containing fluorescein can produce falsely depressed TSH values  If the patient had this procedure,a specimen should be resubmitted post fluorescein clearance  The recommended reference ranges for TSH during pregnancy are as follows:  First trimester 0 1 to 2 5 uIU/mL  Second trimester  0 2 to 3 0 uIU/mL  Third trimester 0 3 to 3 0 uIU/m     (1) CBC/PLT/DIFF 34WEY0258 10:15AM [employer] Order Number: [id number]     Test Name Result Flag Reference   WBC COUNT 7 46 Thousand/uL  4 31-10 16   RBC COUNT 4 79 Million/uL  3 81-5 12   HEMOGLOBIN 13 7 g/dL  11 5-15 4   HEMATOCRIT 41 7 %  34 8-46  1   MCV 87 fL  82-98   MCH 28 6 pg  26 8-34 3   MCHC 32 9 g/dL  31 4-37 4   RDW 14 2 %  11 6-15 1   MPV 10 1 fL  8 9-12 7   PLATELET COUNT 264 Thousands/uL  149-390   NEUTROPHILS RELATIVE PERCENT 70 %  43-75   LYMPHOCYTES RELATIVE PERCENT 21 %  14-44   MONOCYTES RELATIVE PERCENT 7 %  4-12   EOSINOPHILS RELATIVE PERCENT 2 %  0-6 BASOPHILS RELATIVE PERCENT 0 %  0-1   NEUTROPHILS ABSOLUTE COUNT 5 27 Thousands/? ??L  1 85-7 62   LYMPHOCYTES ABSOLUTE COUNT 1 54 Thousands/? ??L  0 60-4 47   MONOCYTES ABSOLUTE COUNT 0 49 Thousand/? ??L  0 17-1 22   EOSINOPHILS ABSOLUTE COUNT 0 13 Thousand/? ??L  0 00-0 61   BASOPHILS ABSOLUTE COUNT 0 03 Thousands/? ??L  0 00-0 10     (1) VITAMIN D 25-HYDROXY 34Bey8694 10:15AM Carol Lee    Order Number: [id number]     Test Name Result Flag Reference   VIT D 25-HYDROX 33 7 ng/mL  30 0-100 0   This assay is a certified procedure of the CDC Vitamin D Standardization Certification Program (VDSCP)     Deficiency <20ng/ml   Insufficiency 20-30ng/ml   Sufficient  ng/ml     *Patients undergoing fluorescein dye angiography may retain small amounts of fluorescein in the body for 48-72 hours post procedure  Samples containing fluorescein can produce falsely elevated Vitamin D values  If the patient had this procedure, a specimen should be resubmitted post fluorescein clearance

## 2018-01-23 NOTE — MISCELLANEOUS
Message  GI Reminder Recall [facility]:   Date: 01/15/2018   Dear Kerri Escoto:     Review of our records shows you are due for the following: Follow Up Visit  Please call the following office to schedule your appointment:   [place] [address], [facility] [address], [facility], [address] [place] [address] [phone]  We look forward to hearing from you!      Sincerely,     [facility]      Signatures   Electronically signed by : Rachel Joseph, ; Augusto 15 2018 12:12PM EST                       (Author)

## 2018-02-17 PROBLEM — K44.9 DIAPHRAGMATIC HERNIA: Status: ACTIVE | Noted: 2017-09-11

## 2018-02-17 PROBLEM — K29.70 GASTRITIS: Status: ACTIVE | Noted: 2017-09-11

## 2018-02-21 ENCOUNTER — OFFICE VISIT (OUTPATIENT)
Dept: INTERNAL MEDICINE CLINIC | Facility: CLINIC | Age: 58
End: 2018-02-21
Payer: COMMERCIAL

## 2018-02-21 VITALS
WEIGHT: 157.4 LBS | RESPIRATION RATE: 18 BRPM | TEMPERATURE: 97.9 F | HEIGHT: 61 IN | HEART RATE: 92 BPM | OXYGEN SATURATION: 99 % | DIASTOLIC BLOOD PRESSURE: 80 MMHG | SYSTOLIC BLOOD PRESSURE: 118 MMHG | BODY MASS INDEX: 29.72 KG/M2

## 2018-02-21 DIAGNOSIS — E66.3 OVERWEIGHT: Primary | ICD-10-CM

## 2018-02-21 DIAGNOSIS — E78.49 OTHER HYPERLIPIDEMIA: ICD-10-CM

## 2018-02-21 DIAGNOSIS — K21.9 GASTROESOPHAGEAL REFLUX DISEASE WITHOUT ESOPHAGITIS: ICD-10-CM

## 2018-02-21 DIAGNOSIS — I10 ESSENTIAL HYPERTENSION: ICD-10-CM

## 2018-02-21 DIAGNOSIS — E55.9 VITAMIN D DEFICIENCY: ICD-10-CM

## 2018-02-21 DIAGNOSIS — L98.9 SKIN LESION: ICD-10-CM

## 2018-02-21 PROCEDURE — 99214 OFFICE O/P EST MOD 30 MIN: CPT | Performed by: INTERNAL MEDICINE

## 2018-02-21 PROCEDURE — 3008F BODY MASS INDEX DOCD: CPT | Performed by: INTERNAL MEDICINE

## 2018-02-21 RX ORDER — RABEPRAZOLE SODIUM 20 MG/1
20 TABLET, DELAYED RELEASE ORAL DAILY
Qty: 90 TABLET | Refills: 1 | Status: SHIPPED | OUTPATIENT
Start: 2018-02-21 | End: 2018-09-10 | Stop reason: SDUPTHER

## 2018-02-21 RX ORDER — PHENTERMINE HYDROCHLORIDE 37.5 MG/1
0.5 TABLET ORAL 2 TIMES DAILY
COMMUNITY
Start: 2017-09-11 | End: 2018-10-01 | Stop reason: SDUPTHER

## 2018-02-21 NOTE — PROGRESS NOTES
Assessment/Plan:    Overweight  Congratulated 17 lb weight loss  Complete remaining phentermine, continue with half tablet once a day  Continue with regular exercise and diet  Consider restarting phentermine in 3-4 months  Hyperlipidemia  At goal, on statin  Hypertension   BP controlled on lisinopril  Acid reflux disease  Trial of AcipHex, ?nexium coverage  Diagnoses and all orders for this visit:    Overweight    Essential hypertension  -     Comprehensive metabolic panel; Future    Other hyperlipidemia  -     Comprehensive metabolic panel; Future  -     Lipid panel; Future    Vitamin D deficiency  -     Vitamin D 25 hydroxy; Future    Gastroesophageal reflux disease without esophagitis  -     RABEprazole (ACIPHEX) 20 MG tablet; Take 1 tablet (20 mg total) by mouth daily    Skin lesion  Comments:  Removed by Dermatology, no signs of infection  Other orders  -     phentermine (ADIPEX-P) 37 5 MG tablet; Take 0 5 tablets by mouth 2 (two) times a day          Subjective:      Patient ID: Prince Adams is a 62 y o  female  Mrs Lockwood Dub is feeling well  She ran out of her Nexium about a month ago, reports increase in reflux symptoms  She has not taken any over-the-counter medication  She has tried omeprazole and pantoprazole in the past which did not help  She is taking the phentermine, has lost almost 20 lb since she started taking it  She has been taking half a tablet every morning  She eats most of her meals during the day, has a light dinner  She has been exercising as well  She is frustrated that she continues to get her monthly menstrual period  She went to the dermatologist recently, skin lesion by her breast was removed          The following portions of the patient's history were reviewed and updated as appropriate: allergies, current medications, past family history, past medical history, past social history, past surgical history and problem list     Review of Systems Constitutional: Negative for appetite change and fatigue  HENT: Negative for congestion, ear pain and postnasal drip  Eyes: Negative for visual disturbance  Respiratory: Negative for cough and shortness of breath  Cardiovascular: Negative for chest pain and leg swelling  Gastrointestinal: Negative for abdominal pain, constipation and diarrhea  Genitourinary: Negative for dysuria, frequency and urgency  Musculoskeletal: Negative for arthralgias and myalgias  Skin: Negative for rash and wound  Neurological: Negative for dizziness, numbness and headaches  Hematological: Does not bruise/bleed easily  Psychiatric/Behavioral: Negative for confusion  The patient is not nervous/anxious  Past Medical History:   Diagnosis Date    GERD (gastroesophageal reflux disease)     Hyperlipidemia     Hypertension     Hypokalemia 2015    PONV (postoperative nausea and vomiting)      Past Surgical History:   Procedure Laterality Date    BREAST SURGERY      reduction     SECTION      HEMORROIDECTOMY      MO EGD TRANSORAL BIOPSY SINGLE/MULTIPLE N/A 2016    Procedure: ESOPHAGOGASTRODUODENOSCOPY (EGD); Surgeon: Maribell Acuña MD;  Location: [facility]; Service: General    TUBAL LIGATION       Family History   Problem Relation Age of Onset    Heart disease Mother      Coronary Artery Bypass Graft    Hyperlipidemia Mother     Kidney failure Mother     Hypertension Father     Hyperlipidemia Brother     Alcohol abuse Neg Hx     Depression Neg Hx     Drug abuse Neg Hx     Substance Abuse Neg Hx      Social History     Social History    Marital status: /Civil Union     Spouse name: N/A    Number of children: N/A    Years of education: N/A     Occupational History    Not on file       Social History Main Topics    Smoking status: Former Smoker    Smokeless tobacco: Never Used      Comment: Never a smoker  per [employer]    Alcohol use Yes      Comment: social - minimum alcohol  consumption per [employer]    Drug use: No    Sexual activity: Not on file     Other Topics Concern    Not on file     Social History Narrative    No narrative on file       Current Outpatient Prescriptions:     atorvastatin (LIPITOR) 20 mg tablet, Take 20 mg by mouth daily, Disp: , Rfl:     lisinopril (ZESTRIL) 10 mg tablet, Take 10 mg by mouth daily, Disp: , Rfl:     phentermine (ADIPEX-P) 37 5 MG tablet, Take 0 5 tablets by mouth 2 (two) times a day, Disp: , Rfl:     RABEprazole (ACIPHEX) 20 MG tablet, Take 1 tablet (20 mg total) by mouth daily, Disp: 90 tablet, Rfl: 1  No Known Allergies      Objective:      /80   Pulse 92   Temp 97 9 °F (36 6 °C)   Resp 18   Ht 5' 1" (1 549 m)   Wt 71 4 kg (157 lb 6 4 oz)   SpO2 99%   BMI 29 74 kg/m²          Physical Exam   Constitutional: She is oriented to person, place, and time  She appears well-developed and well-nourished  HENT:   Head: Normocephalic and atraumatic  Nose: Nose normal    Eyes: Conjunctivae are normal  Pupils are equal, round, and reactive to light  Neck: Neck supple  Cardiovascular: Normal rate, regular rhythm and normal heart sounds  No edema  Pulmonary/Chest: Effort normal and breath sounds normal  She has no wheezes  She has no rales  Abdominal: Soft  Bowel sounds are normal    Neurological: She is alert and oriented to person, place, and time  Skin: Skin is warm  No rash noted  Psychiatric: She has a normal mood and affect  Her behavior is normal    Nursing note and vitals reviewed

## 2018-02-21 NOTE — ASSESSMENT & PLAN NOTE
Congratulated 17 lb weight loss  Complete remaining phentermine, continue with half tablet once a day  Continue with regular exercise and diet  Consider restarting phentermine in 3-4 months

## 2018-02-21 NOTE — PROGRESS NOTES
Assessment/Plan:    No problem-specific Assessment & Plan notes found for this encounter  {Assess/PlanSmartLinks:71776}      Subjective:      Patient ID: Deborah Colunga is a 62 y o  female  1  Obesity  Weight gain since last visit  Discussed medication to help with weight loss  She would like to try phentermine, discussed possible side effects  Continue regular exercise, limit carbs, portion control  2  GERD, gastritis and diaphragmatic hernia on EGD  Minimal improvement of symptoms with various PPI  Recommend to take PPI in AM, ranitidine at night  Refer to ENT  3  Skin tags  Refer to dermatology, needs skin check as well  Monitor for axillary abscess  4  HTN  BP controlled on lisinopril  5  Hyperlipidemia  On statin  6  HM  Updated  Follow up in 3 months or prn           {Common ambulatory SmartLinks:35552}    Review of Systems      Objective: There were no vitals taken for this visit           Physical Exam

## 2018-03-12 ENCOUNTER — TRANSCRIBE ORDERS (OUTPATIENT)
Dept: LAB | Facility: CLINIC | Age: 58
End: 2018-03-12

## 2018-03-13 ENCOUNTER — TELEPHONE (OUTPATIENT)
Dept: INTERNAL MEDICINE CLINIC | Facility: CLINIC | Age: 58
End: 2018-03-13

## 2018-03-13 DIAGNOSIS — Z12.31 ENCOUNTER FOR SCREENING MAMMOGRAM FOR BREAST CANCER: Primary | ICD-10-CM

## 2018-03-13 NOTE — TELEPHONE ENCOUNTER
Pt  IS  [address]  SCREENING MAMMO  SHE NEEDS A NEW SCRIPT ENTERED  CAN JUST PUT IT IN [facility]  NO NEED TO FAX

## 2018-03-19 ENCOUNTER — HOSPITAL ENCOUNTER (OUTPATIENT)
Dept: RADIOLOGY | Age: 58
Discharge: HOME | End: 2018-03-19
Payer: COMMERCIAL

## 2018-03-19 DIAGNOSIS — Z12.31 ENCOUNTER FOR SCREENING MAMMOGRAM FOR BREAST CANCER: ICD-10-CM

## 2018-03-19 PROCEDURE — 77067 SCR MAMMO BI INCL CAD: CPT

## 2018-03-27 DIAGNOSIS — I10 ESSENTIAL HYPERTENSION: Primary | ICD-10-CM

## 2018-03-28 RX ORDER — LISINOPRIL 10 MG/1
10 TABLET ORAL DAILY
Qty: 90 TABLET | Refills: 0 | Status: SHIPPED | OUTPATIENT
Start: 2018-03-28 | End: 2018-06-19 | Stop reason: SDUPTHER

## 2018-05-07 DIAGNOSIS — E78.2 MIXED HYPERLIPIDEMIA: Primary | ICD-10-CM

## 2018-05-07 RX ORDER — ATORVASTATIN CALCIUM 20 MG/1
20 TABLET, FILM COATED ORAL DAILY
Qty: 90 TABLET | Refills: 1 | Status: SHIPPED | OUTPATIENT
Start: 2018-05-07 | End: 2018-05-14 | Stop reason: SDUPTHER

## 2018-05-14 DIAGNOSIS — E78.2 MIXED HYPERLIPIDEMIA: ICD-10-CM

## 2018-05-14 RX ORDER — ATORVASTATIN CALCIUM 20 MG/1
20 TABLET, FILM COATED ORAL DAILY
Qty: 90 TABLET | Refills: 0 | Status: SHIPPED | OUTPATIENT
Start: 2018-05-14 | End: 2019-02-14 | Stop reason: SDUPTHER

## 2018-06-19 DIAGNOSIS — I10 ESSENTIAL HYPERTENSION: ICD-10-CM

## 2018-06-19 RX ORDER — LISINOPRIL 10 MG/1
10 TABLET ORAL DAILY
Qty: 90 TABLET | Refills: 0 | Status: SHIPPED | OUTPATIENT
Start: 2018-06-19 | End: 2018-10-01 | Stop reason: SDUPTHER

## 2018-06-19 RX ORDER — PHENTERMINE HYDROCHLORIDE 37.5 MG/1
18.75 TABLET ORAL 2 TIMES DAILY
Qty: 30 TABLET | Refills: 0 | OUTPATIENT
Start: 2018-06-19

## 2018-06-21 DIAGNOSIS — I10 ESSENTIAL HYPERTENSION: ICD-10-CM

## 2018-06-22 RX ORDER — LISINOPRIL 10 MG/1
10 TABLET ORAL DAILY
Qty: 90 TABLET | Refills: 0 | OUTPATIENT
Start: 2018-06-22

## 2018-06-22 RX ORDER — PHENTERMINE HYDROCHLORIDE 37.5 MG/1
18.75 TABLET ORAL 2 TIMES DAILY
Refills: 0 | OUTPATIENT
Start: 2018-06-22

## 2018-08-16 ENCOUNTER — TELEPHONE (OUTPATIENT)
Dept: INTERNAL MEDICINE CLINIC | Facility: CLINIC | Age: 58
End: 2018-08-16

## 2018-08-16 DIAGNOSIS — E78.49 OTHER HYPERLIPIDEMIA: Primary | ICD-10-CM

## 2018-08-16 NOTE — TELEPHONE ENCOUNTER
Pt calling :    R fingers are swelling for a couple of months - not getting worse but not getting better  Statin gives her richard horse/makes her cramp up, especially at night  Stopped it 1 week ago  Did you want to try something else?     Going through Menopause and hair is not growing "is that normal?"

## 2018-08-16 NOTE — TELEPHONE ENCOUNTER
Pt notified and states no numbness or tingling, just stings  Can be hard to bend  Only right hand, Feels as if she had a cut  States she will do labs tomorrow   Will think about coming in to have it looked at- if so she will call tomorrow

## 2018-08-16 NOTE — TELEPHONE ENCOUNTER
Labs ordered  Can try B vitamin for the hair  Any numbness or tingling of the hand? Might be carpal tunnel  Can schedule appt so I can examine it   She has a regular f/u appt not until October

## 2018-08-16 NOTE — TELEPHONE ENCOUNTER
Is the swelling only in your right hand or left hand as well? Any pain or injury? Can try taking 1/2 tablet of the atorvastatin (cholesterol med) daily or every other day  If still with muscle cramps, let me know  Do you feel you are losing your hair or just thin?  Check thyroid last December and it was normal  Can recheck again if you'd like

## 2018-08-16 NOTE — TELEPHONE ENCOUNTER
Patient notified  States the swelling is only in her R fingers and when she bends her fingers it burns  States she feels she her hair is thinning and she would like to re-check thyroid level  Patient would like to know if there is a vitamin she can also take OTC for her hair

## 2018-08-17 ENCOUNTER — APPOINTMENT (OUTPATIENT)
Dept: LAB | Facility: CLINIC | Age: 58
End: 2018-08-17
Payer: COMMERCIAL

## 2018-08-17 ENCOUNTER — TELEPHONE (OUTPATIENT)
Dept: INTERNAL MEDICINE CLINIC | Facility: CLINIC | Age: 58
End: 2018-08-17

## 2018-08-17 ENCOUNTER — TRANSCRIBE ORDERS (OUTPATIENT)
Dept: LAB | Facility: CLINIC | Age: 58
End: 2018-08-17

## 2018-08-17 DIAGNOSIS — I10 ESSENTIAL HYPERTENSION: ICD-10-CM

## 2018-08-17 DIAGNOSIS — E78.49 OTHER HYPERLIPIDEMIA: ICD-10-CM

## 2018-08-17 DIAGNOSIS — I10 ESSENTIAL HYPERTENSION: Primary | ICD-10-CM

## 2018-08-17 DIAGNOSIS — E55.9 VITAMIN D DEFICIENCY: ICD-10-CM

## 2018-08-17 LAB
25(OH)D3 SERPL-MCNC: 29.4 NG/ML (ref 30–100)
ALBUMIN SERPL BCP-MCNC: 3.5 G/DL (ref 3.5–5)
ALP SERPL-CCNC: 80 U/L (ref 46–116)
ALT SERPL W P-5'-P-CCNC: 39 U/L (ref 12–78)
ANION GAP SERPL CALCULATED.3IONS-SCNC: 4 MMOL/L (ref 4–13)
AST SERPL W P-5'-P-CCNC: 20 U/L (ref 5–45)
BASOPHILS # BLD AUTO: 0.05 THOUSANDS/ΜL (ref 0–0.1)
BASOPHILS NFR BLD AUTO: 1 % (ref 0–1)
BILIRUB SERPL-MCNC: 0.4 MG/DL (ref 0.2–1)
BUN SERPL-MCNC: 22 MG/DL (ref 5–25)
CALCIUM SERPL-MCNC: 9 MG/DL (ref 8.3–10.1)
CHLORIDE SERPL-SCNC: 106 MMOL/L (ref 100–108)
CHOLEST SERPL-MCNC: 207 MG/DL (ref 50–200)
CO2 SERPL-SCNC: 30 MMOL/L (ref 21–32)
CREAT SERPL-MCNC: 0.77 MG/DL (ref 0.6–1.3)
EOSINOPHIL # BLD AUTO: 0.14 THOUSAND/ΜL (ref 0–0.61)
EOSINOPHIL NFR BLD AUTO: 2 % (ref 0–6)
ERYTHROCYTE [DISTWIDTH] IN BLOOD BY AUTOMATED COUNT: 13.5 % (ref 11.6–15.1)
GFR SERPL CREATININE-BSD FRML MDRD: 85 ML/MIN/1.73SQ M
GLUCOSE P FAST SERPL-MCNC: 107 MG/DL (ref 65–99)
HCT VFR BLD AUTO: 44.5 % (ref 34.8–46.1)
HDLC SERPL-MCNC: 70 MG/DL (ref 40–60)
HGB BLD-MCNC: 14.3 G/DL (ref 11.5–15.4)
IMM GRANULOCYTES # BLD AUTO: 0.02 THOUSAND/UL (ref 0–0.2)
IMM GRANULOCYTES NFR BLD AUTO: 0 % (ref 0–2)
LDLC SERPL CALC-MCNC: 121 MG/DL (ref 0–100)
LYMPHOCYTES # BLD AUTO: 1.25 THOUSANDS/ΜL (ref 0.6–4.47)
LYMPHOCYTES NFR BLD AUTO: 21 % (ref 14–44)
MCH RBC QN AUTO: 28.1 PG (ref 26.8–34.3)
MCHC RBC AUTO-ENTMCNC: 32.1 G/DL (ref 31.4–37.4)
MCV RBC AUTO: 87 FL (ref 82–98)
MONOCYTES # BLD AUTO: 0.47 THOUSAND/ΜL (ref 0.17–1.22)
MONOCYTES NFR BLD AUTO: 8 % (ref 4–12)
NEUTROPHILS # BLD AUTO: 4.07 THOUSANDS/ΜL (ref 1.85–7.62)
NEUTS SEG NFR BLD AUTO: 68 % (ref 43–75)
NONHDLC SERPL-MCNC: 137 MG/DL
NRBC BLD AUTO-RTO: 0 /100 WBCS
PLATELET # BLD AUTO: 283 THOUSANDS/UL (ref 149–390)
PMV BLD AUTO: 9.4 FL (ref 8.9–12.7)
POTASSIUM SERPL-SCNC: 4.2 MMOL/L (ref 3.5–5.3)
PROT SERPL-MCNC: 7.3 G/DL (ref 6.4–8.2)
RBC # BLD AUTO: 5.09 MILLION/UL (ref 3.81–5.12)
SODIUM SERPL-SCNC: 140 MMOL/L (ref 136–145)
TRIGL SERPL-MCNC: 79 MG/DL
TSH SERPL DL<=0.05 MIU/L-ACNC: 1.24 UIU/ML (ref 0.36–3.74)
WBC # BLD AUTO: 6 THOUSAND/UL (ref 4.31–10.16)

## 2018-08-17 PROCEDURE — 80061 LIPID PANEL: CPT

## 2018-08-17 PROCEDURE — 85025 COMPLETE CBC W/AUTO DIFF WBC: CPT

## 2018-08-17 PROCEDURE — 36415 COLL VENOUS BLD VENIPUNCTURE: CPT

## 2018-08-17 PROCEDURE — 84443 ASSAY THYROID STIM HORMONE: CPT

## 2018-08-17 PROCEDURE — 80053 COMPREHEN METABOLIC PANEL: CPT

## 2018-08-17 PROCEDURE — 82306 VITAMIN D 25 HYDROXY: CPT

## 2018-08-17 NOTE — TELEPHONE ENCOUNTER
----- Message from Leanna Krishnamurthy MD sent at 8/17/2018 10:55 AM EDT -----  Thyroid test is normal  Cholesterol is bit higher, rest of labs area ok

## 2018-08-17 NOTE — TELEPHONE ENCOUNTER
Pt notified  Will try B Vitamins first   Wonders if you can put in repeat labs for Oct appt after she has been taking the B Vitamins for some time?

## 2018-08-17 NOTE — TELEPHONE ENCOUNTER
Patient notified  Patient would like to know what she should to help her hair grow back  States it is not thinning out  States when her hair breaks off it does not grow back  Patient would like to know if she can use Rogaine  Would like to know what PCP recommends

## 2018-08-17 NOTE — TELEPHONE ENCOUNTER
Common cause of hair loss is stress  You can try Rogaine if you'd like    Recommend to try B vitamins first

## 2018-09-10 DIAGNOSIS — K21.9 GASTROESOPHAGEAL REFLUX DISEASE WITHOUT ESOPHAGITIS: ICD-10-CM

## 2018-09-10 RX ORDER — RABEPRAZOLE SODIUM 20 MG/1
20 TABLET, DELAYED RELEASE ORAL DAILY
Qty: 90 TABLET | Refills: 0 | Status: SHIPPED | OUTPATIENT
Start: 2018-09-10 | End: 2018-12-06 | Stop reason: SDUPTHER

## 2018-09-27 ENCOUNTER — APPOINTMENT (OUTPATIENT)
Dept: LAB | Facility: CLINIC | Age: 58
End: 2018-09-27
Payer: COMMERCIAL

## 2018-09-27 ENCOUNTER — TRANSCRIBE ORDERS (OUTPATIENT)
Dept: LAB | Facility: CLINIC | Age: 58
End: 2018-09-27

## 2018-09-27 DIAGNOSIS — I10 ESSENTIAL HYPERTENSION: ICD-10-CM

## 2018-09-27 LAB
ANION GAP SERPL CALCULATED.3IONS-SCNC: 10 MMOL/L (ref 4–13)
BUN SERPL-MCNC: 19 MG/DL (ref 5–25)
CALCIUM SERPL-MCNC: 9.1 MG/DL (ref 8.3–10.1)
CHLORIDE SERPL-SCNC: 105 MMOL/L (ref 100–108)
CO2 SERPL-SCNC: 28 MMOL/L (ref 21–32)
CREAT SERPL-MCNC: 0.77 MG/DL (ref 0.6–1.3)
GFR SERPL CREATININE-BSD FRML MDRD: 85 ML/MIN/1.73SQ M
GLUCOSE P FAST SERPL-MCNC: 103 MG/DL (ref 65–99)
POTASSIUM SERPL-SCNC: 3.9 MMOL/L (ref 3.5–5.3)
SODIUM SERPL-SCNC: 143 MMOL/L (ref 136–145)
VIT B12 SERPL-MCNC: 752 PG/ML (ref 100–900)

## 2018-09-27 PROCEDURE — 36415 COLL VENOUS BLD VENIPUNCTURE: CPT

## 2018-09-27 PROCEDURE — 82607 VITAMIN B-12: CPT

## 2018-09-27 PROCEDURE — 80048 BASIC METABOLIC PNL TOTAL CA: CPT

## 2018-10-01 ENCOUNTER — OFFICE VISIT (OUTPATIENT)
Dept: INTERNAL MEDICINE CLINIC | Facility: CLINIC | Age: 58
End: 2018-10-01
Payer: COMMERCIAL

## 2018-10-01 VITALS
HEART RATE: 72 BPM | WEIGHT: 163.8 LBS | TEMPERATURE: 98.6 F | DIASTOLIC BLOOD PRESSURE: 78 MMHG | BODY MASS INDEX: 30.93 KG/M2 | OXYGEN SATURATION: 98 % | HEIGHT: 61 IN | SYSTOLIC BLOOD PRESSURE: 120 MMHG | RESPIRATION RATE: 18 BRPM

## 2018-10-01 DIAGNOSIS — I10 ESSENTIAL HYPERTENSION: ICD-10-CM

## 2018-10-01 DIAGNOSIS — Z23 NEED FOR INFLUENZA VACCINATION: Primary | ICD-10-CM

## 2018-10-01 DIAGNOSIS — G56.01 CARPAL TUNNEL SYNDROME OF RIGHT WRIST: ICD-10-CM

## 2018-10-01 DIAGNOSIS — E66.09 CLASS 1 OBESITY DUE TO EXCESS CALORIES WITHOUT SERIOUS COMORBIDITY WITH BODY MASS INDEX (BMI) OF 30.0 TO 30.9 IN ADULT: ICD-10-CM

## 2018-10-01 DIAGNOSIS — E55.9 VITAMIN D DEFICIENCY: ICD-10-CM

## 2018-10-01 DIAGNOSIS — E78.49 OTHER HYPERLIPIDEMIA: ICD-10-CM

## 2018-10-01 PROCEDURE — 3078F DIAST BP <80 MM HG: CPT | Performed by: INTERNAL MEDICINE

## 2018-10-01 PROCEDURE — 90682 RIV4 VACC RECOMBINANT DNA IM: CPT

## 2018-10-01 PROCEDURE — 1036F TOBACCO NON-USER: CPT | Performed by: INTERNAL MEDICINE

## 2018-10-01 PROCEDURE — 3074F SYST BP LT 130 MM HG: CPT | Performed by: INTERNAL MEDICINE

## 2018-10-01 PROCEDURE — 90471 IMMUNIZATION ADMIN: CPT

## 2018-10-01 PROCEDURE — 99214 OFFICE O/P EST MOD 30 MIN: CPT | Performed by: INTERNAL MEDICINE

## 2018-10-01 RX ORDER — PHENTERMINE HYDROCHLORIDE 37.5 MG/1
18.75 TABLET ORAL 2 TIMES DAILY
Qty: 30 TABLET | Refills: 1 | Status: SHIPPED | OUTPATIENT
Start: 2018-10-01 | End: 2018-12-01 | Stop reason: SDUPTHER

## 2018-10-01 RX ORDER — LISINOPRIL 10 MG/1
10 TABLET ORAL DAILY
Qty: 90 TABLET | Refills: 1 | Status: SHIPPED | OUTPATIENT
Start: 2018-10-01 | End: 2019-03-26 | Stop reason: SDUPTHER

## 2018-10-01 RX ORDER — LISINOPRIL 10 MG/1
10 TABLET ORAL DAILY
Qty: 90 TABLET | Refills: 0 | OUTPATIENT
Start: 2018-10-01

## 2018-10-01 NOTE — PROGRESS NOTES
Assessment/Plan:    Class 1 obesity due to excess calories without serious comorbidity with body mass index (BMI) of 30 0 to 30 9 in adult  Weight gain since last visit  Restart regular exercise  Restart phentermine x 3 months only  Essential hypertension  BP stable, continue lisinopril  Other hyperlipidemia  Total and LDL slightly elevated, maintain on current dose of statin  Gastritis  On daily PPI  Vitamin D deficiency  On daily supplements  Carpal tunnel syndrome of right wrist  Trial of wrist brace qHS  Diagnoses and all orders for this visit:    Need for influenza vaccination  -     influenza vaccine, [id number], quadrivalent, recombinant, PF, 0 5 mL, for patients 18 yr+ (FLUBLOK)    Essential hypertension  -     lisinopril (ZESTRIL) 10 mg tablet; Take 1 tablet (10 mg total) by mouth daily  -     Comprehensive metabolic panel; Future  -     Lipid panel; Future    Class 1 obesity due to excess calories without serious comorbidity with body mass index (BMI) of 30 0 to 30 9 in adult  -     phentermine (ADIPEX-P) 37 5 MG tablet; Take 0 5 tablets (18 75 mg total) by mouth 2 (two) times a day    Carpal tunnel syndrome of right wrist  -     Elastic Bandages & Supports (WRIST SPLINT/COCK-UP/RIGHT M) MISC; by Does not apply route daily at bedtime    Other hyperlipidemia  -     Comprehensive metabolic panel; Future  -     Lipid panel; Future    Vitamin D deficiency  -     Vitamin D 25 hydroxy; Future      Follow up in 6 months or as needed  Subjective:      Patient ID: Yony Arreaga is a 62 y o  female  Ms Julee Luo complains of right hand pain  She reports symptoms started a few months ago  She describes that her right hand is sometimes heavy and burning, mostly in 3 of her fingers  She denies any trauma or falls  She denies any severe pain or swelling  Symptoms worse at night or when she wakes up in the morning      She continues to experience cramping in her feet, sometimes in her legs   She eats a banana every day  She tries to drink enough fluids daily  Symptoms does not occur regularly  She would like to try taking phentermine again  She was frustrated during the summer because it drained a lot, was not able to swim as much as she wanted to  The following portions of the patient's history were reviewed and updated as appropriate: allergies, current medications, past medical history, past social history and problem list     Review of Systems   Constitutional: Negative for appetite change and fatigue  HENT: Negative for congestion  Eyes: Negative for visual disturbance  Respiratory: Negative for cough and shortness of breath  Cardiovascular: Negative for chest pain and leg swelling  Gastrointestinal: Negative for abdominal pain, constipation and diarrhea  Genitourinary: Negative for dysuria, frequency and urgency  Musculoskeletal: Positive for arthralgias  Negative for myalgias  Skin: Negative for rash and wound  Neurological: Negative for dizziness, weakness, numbness and headaches  Psychiatric/Behavioral: Negative for confusion  The patient is not nervous/anxious  Objective:      /78   Pulse 72   Temp 98 6 °F (37 °C)   Resp 18   Ht 5' 1" (1 549 m)   Wt 74 3 kg (163 lb 12 8 oz)   SpO2 98%   BMI 30 95 kg/m²          Physical Exam   Constitutional: She is oriented to person, place, and time  She appears well-developed and well-nourished  HENT:   Head: Normocephalic and atraumatic  Nose: Nose normal    Eyes: Pupils are equal, round, and reactive to light  Conjunctivae are normal    Neck: Neck supple  Cardiovascular: Normal rate, regular rhythm and normal heart sounds  No edema  Pulmonary/Chest: Effort normal and breath sounds normal  She has no wheezes  She has no rales  Abdominal: Soft   Bowel sounds are normal    Musculoskeletal:   (-)Tinel's, right   No atrophy, both hands   Neurological: She is alert and oriented to person, place, and time  Skin: Skin is warm  No rash noted  Psychiatric: She has a normal mood and affect  Her behavior is normal    Nursing note and vitals reviewed  Lab results reviewed with patient

## 2018-12-01 DIAGNOSIS — E66.09 CLASS 1 OBESITY DUE TO EXCESS CALORIES WITHOUT SERIOUS COMORBIDITY WITH BODY MASS INDEX (BMI) OF 30.0 TO 30.9 IN ADULT: ICD-10-CM

## 2018-12-03 RX ORDER — PHENTERMINE HYDROCHLORIDE 37.5 MG/1
18.75 TABLET ORAL 2 TIMES DAILY
Qty: 30 TABLET | Refills: 0 | Status: SHIPPED | OUTPATIENT
Start: 2018-12-03 | End: 2019-04-02 | Stop reason: ALTCHOICE

## 2018-12-04 DIAGNOSIS — E66.09 CLASS 1 OBESITY DUE TO EXCESS CALORIES WITHOUT SERIOUS COMORBIDITY WITH BODY MASS INDEX (BMI) OF 30.0 TO 30.9 IN ADULT: ICD-10-CM

## 2018-12-04 RX ORDER — PHENTERMINE HYDROCHLORIDE 37.5 MG/1
18.75 TABLET ORAL 2 TIMES DAILY
Qty: 30 TABLET | Refills: 1 | OUTPATIENT
Start: 2018-12-04

## 2018-12-06 DIAGNOSIS — E66.09 CLASS 1 OBESITY DUE TO EXCESS CALORIES WITHOUT SERIOUS COMORBIDITY WITH BODY MASS INDEX (BMI) OF 30.0 TO 30.9 IN ADULT: ICD-10-CM

## 2018-12-06 DIAGNOSIS — K21.9 GASTROESOPHAGEAL REFLUX DISEASE WITHOUT ESOPHAGITIS: ICD-10-CM

## 2018-12-06 RX ORDER — PHENTERMINE HYDROCHLORIDE 37.5 MG/1
18.75 TABLET ORAL 2 TIMES DAILY
Qty: 90 TABLET | Refills: 0 | OUTPATIENT
Start: 2018-12-06

## 2018-12-06 RX ORDER — RABEPRAZOLE SODIUM 20 MG/1
TABLET, DELAYED RELEASE ORAL
Qty: 90 TABLET | Refills: 1 | Status: SHIPPED | OUTPATIENT
Start: 2018-12-06 | End: 2019-05-31 | Stop reason: SDUPTHER

## 2018-12-06 NOTE — TELEPHONE ENCOUNTER
Patient took the last pill of the Phentermine today would like to know if she can get a 3 month supply      Staff please call patient only if medication is not filled

## 2019-02-14 DIAGNOSIS — E78.2 MIXED HYPERLIPIDEMIA: ICD-10-CM

## 2019-02-14 RX ORDER — ATORVASTATIN CALCIUM 20 MG/1
20 TABLET, FILM COATED ORAL DAILY
Qty: 90 TABLET | Refills: 0 | Status: SHIPPED | OUTPATIENT
Start: 2019-02-14 | End: 2019-05-20 | Stop reason: SDUPTHER

## 2019-03-15 ENCOUNTER — OFFICE VISIT (OUTPATIENT)
Dept: URGENT CARE | Age: 59
End: 2019-03-15
Payer: COMMERCIAL

## 2019-03-15 VITALS
HEART RATE: 78 BPM | RESPIRATION RATE: 18 BRPM | OXYGEN SATURATION: 99 % | DIASTOLIC BLOOD PRESSURE: 64 MMHG | SYSTOLIC BLOOD PRESSURE: 142 MMHG | TEMPERATURE: 98.9 F

## 2019-03-15 DIAGNOSIS — J01.90 ACUTE SINUSITIS, RECURRENCE NOT SPECIFIED, UNSPECIFIED LOCATION: Primary | ICD-10-CM

## 2019-03-15 PROCEDURE — G0382 LEV 3 HOSP TYPE B ED VISIT: HCPCS | Performed by: FAMILY MEDICINE

## 2019-03-15 RX ORDER — AMOXICILLIN 875 MG/1
875 TABLET, COATED ORAL 2 TIMES DAILY
Qty: 20 TABLET | Refills: 0 | Status: SHIPPED | OUTPATIENT
Start: 2019-03-15 | End: 2019-03-25

## 2019-03-15 NOTE — PATIENT INSTRUCTIONS
Rest and drink extra fluids  Start antibiotic  Take probiotic  Over-the-counter cough and cold medications as needed  Nasal saline flushes and Neti pot can be helpful with sinus congestion  Follow up with family doctor if no improvement  Go to the ER with any shortness of breath chest pain difficulty breathing worsening symptoms

## 2019-03-15 NOTE — PROGRESS NOTES
[facility]        NAME: Viviane Jo is a 61 y o  female  : 1960    MRN: [id number]  DATE: March 15, 2019  TIME: 12:05 PM    Assessment and Plan   Acute sinusitis, recurrence not specified, unspecified location [J01 90]  1  Acute sinusitis, recurrence not specified, unspecified location  amoxicillin (AMOXIL) 875 mg tablet         Patient Instructions     Patient Instructions   Rest and drink extra fluids  Start antibiotic  Take probiotic  Over-the-counter cough and cold medications as needed  Nasal saline flushes and Neti pot can be helpful with sinus congestion  Follow up with family doctor if no improvement  Go to the ER with any shortness of breath chest pain difficulty breathing worsening symptoms  Chief Complaint     Chief Complaint   Patient presents with    Cold Like Symptoms     bodyaches, sore throat , stuffy nose , a little cough, and sorethroat x monday,          History of Present Illness   Viviane Jo presents to the clinic c/o    This is a 51-year-old female here today with complaints sore throat, nasal congestion, fever and feeling poorly  She states symptoms started about 5 days ago  She is having some nasal congestion and postnasal drip  She states she had a fever yesterday of 101 4  She has been using Advil as needed  She denies any shortness of breath or chest pain  She does have slight cough  Review of Systems   Review of Systems   Constitutional: Positive for activity change, fatigue and fever  HENT: Positive for congestion, sinus pressure and sore throat  Respiratory: Positive for cough  Negative for chest tightness and wheezing  Cardiovascular: Negative  Genitourinary: Negative  Musculoskeletal: Negative  Neurological: Negative  Psychiatric/Behavioral: Negative            Current Medications     Long-Term Medications   Medication Sig Dispense Refill    atorvastatin (LIPITOR) 20 mg tablet Take 1 tablet (20 mg total) by mouth daily 90 tablet 0    lisinopril (ZESTRIL) 10 mg tablet Take 1 tablet (10 mg total) by mouth daily 90 tablet 1    phentermine (ADIPEX-P) 37 5 MG tablet TAKE 0 5 TABLETS (18 75 MG TOTAL) BY MOUTH 2 (TWO) TIMES A DAY 30 tablet 0    RABEprazole (ACIPHEX) 20 MG tablet TAKE 1 TABLET BY MOUTH EVERY DAY 90 tablet 1       Current Allergies     Allergies as of 03/15/2019    (No Known Allergies)            The following portions of the patient's history were reviewed and updated as appropriate: allergies, current medications, past family history, past medical history, past social history, past surgical history and problem list     Objective   /64   Pulse 78   Temp 98 9 °F (37 2 °C) (Temporal)   Resp 18   LMP 02/28/2019   SpO2 99%        Physical Exam     Physical Exam   Constitutional: She is oriented to person, place, and time  She appears well-developed and well-nourished  HENT:   Right Ear: External ear normal    Left Ear: External ear normal    Posterior pharynx mildly erythemic   Neck: Normal range of motion  Neck supple  Cardiovascular: Normal rate and regular rhythm  Pulmonary/Chest: Effort normal and breath sounds normal    Neurological: She is alert and oriented to person, place, and time  Skin: Skin is warm and dry  Psychiatric: She has a normal mood and affect  Her behavior is normal    Nursing note and vitals reviewed

## 2019-03-22 ENCOUNTER — HOSPITAL ENCOUNTER (OUTPATIENT)
Dept: RADIOLOGY | Age: 59
Discharge: HOME | End: 2019-03-22
Payer: COMMERCIAL

## 2019-03-22 VITALS — WEIGHT: 150 LBS | BODY MASS INDEX: 27.6 KG/M2 | HEIGHT: 62 IN

## 2019-03-22 DIAGNOSIS — Z12.31 ENCOUNTER FOR SCREENING MAMMOGRAM FOR MALIGNANT NEOPLASM OF BREAST: ICD-10-CM

## 2019-03-22 PROCEDURE — 77067 SCR MAMMO BI INCL CAD: CPT

## 2019-03-26 DIAGNOSIS — I10 ESSENTIAL HYPERTENSION: ICD-10-CM

## 2019-03-26 RX ORDER — LISINOPRIL 10 MG/1
10 TABLET ORAL DAILY
Qty: 90 TABLET | Refills: 1 | Status: SHIPPED | OUTPATIENT
Start: 2019-03-26 | End: 2019-09-22 | Stop reason: SDUPTHER

## 2019-04-02 ENCOUNTER — OFFICE VISIT (OUTPATIENT)
Dept: INTERNAL MEDICINE CLINIC | Facility: CLINIC | Age: 59
End: 2019-04-02
Payer: COMMERCIAL

## 2019-04-02 VITALS
TEMPERATURE: 98.4 F | HEART RATE: 99 BPM | DIASTOLIC BLOOD PRESSURE: 70 MMHG | SYSTOLIC BLOOD PRESSURE: 132 MMHG | BODY MASS INDEX: 26.5 KG/M2 | RESPIRATION RATE: 18 BRPM | HEIGHT: 62 IN | WEIGHT: 144 LBS | OXYGEN SATURATION: 99 %

## 2019-04-02 DIAGNOSIS — E55.9 VITAMIN D DEFICIENCY: ICD-10-CM

## 2019-04-02 DIAGNOSIS — I10 ESSENTIAL HYPERTENSION: ICD-10-CM

## 2019-04-02 DIAGNOSIS — K29.70 GASTRITIS WITHOUT BLEEDING, UNSPECIFIED CHRONICITY, UNSPECIFIED GASTRITIS TYPE: ICD-10-CM

## 2019-04-02 DIAGNOSIS — E78.49 OTHER HYPERLIPIDEMIA: ICD-10-CM

## 2019-04-02 DIAGNOSIS — M79.642 PAIN IN BOTH HANDS: Primary | ICD-10-CM

## 2019-04-02 DIAGNOSIS — R73.01 ABNORMAL FASTING GLUCOSE: ICD-10-CM

## 2019-04-02 DIAGNOSIS — E66.3 OVERWEIGHT: ICD-10-CM

## 2019-04-02 DIAGNOSIS — Z11.59 NEED FOR HEPATITIS C SCREENING TEST: ICD-10-CM

## 2019-04-02 DIAGNOSIS — M79.641 PAIN IN BOTH HANDS: Primary | ICD-10-CM

## 2019-04-02 DIAGNOSIS — I83.812 VARICOSE VEINS OF LEFT LOWER EXTREMITY WITH PAIN: ICD-10-CM

## 2019-04-02 PROCEDURE — 3075F SYST BP GE 130 - 139MM HG: CPT | Performed by: INTERNAL MEDICINE

## 2019-04-02 PROCEDURE — 3008F BODY MASS INDEX DOCD: CPT | Performed by: INTERNAL MEDICINE

## 2019-04-02 PROCEDURE — 99214 OFFICE O/P EST MOD 30 MIN: CPT | Performed by: INTERNAL MEDICINE

## 2019-04-02 PROCEDURE — 1036F TOBACCO NON-USER: CPT | Performed by: INTERNAL MEDICINE

## 2019-04-02 PROCEDURE — 3078F DIAST BP <80 MM HG: CPT | Performed by: INTERNAL MEDICINE

## 2019-04-03 ENCOUNTER — APPOINTMENT (OUTPATIENT)
Dept: LAB | Facility: CLINIC | Age: 59
End: 2019-04-03
Payer: COMMERCIAL

## 2019-04-03 ENCOUNTER — HOSPITAL ENCOUNTER (OUTPATIENT)
Dept: RADIOLOGY | Facility: HOSPITAL | Age: 59
Discharge: HOME | End: 2019-04-03
Payer: COMMERCIAL

## 2019-04-03 DIAGNOSIS — I10 ESSENTIAL HYPERTENSION: ICD-10-CM

## 2019-04-03 DIAGNOSIS — M79.641 PAIN IN BOTH HANDS: ICD-10-CM

## 2019-04-03 DIAGNOSIS — Z11.59 NEED FOR HEPATITIS C SCREENING TEST: ICD-10-CM

## 2019-04-03 DIAGNOSIS — E55.9 VITAMIN D DEFICIENCY: ICD-10-CM

## 2019-04-03 DIAGNOSIS — E78.49 OTHER HYPERLIPIDEMIA: ICD-10-CM

## 2019-04-03 DIAGNOSIS — M79.642 PAIN IN BOTH HANDS: ICD-10-CM

## 2019-04-03 LAB
25(OH)D3 SERPL-MCNC: 32.6 NG/ML (ref 30–100)
ALBUMIN SERPL BCP-MCNC: 4 G/DL (ref 3.5–5)
ALP SERPL-CCNC: 83 U/L (ref 46–116)
ALT SERPL W P-5'-P-CCNC: 39 U/L (ref 12–78)
ANION GAP SERPL CALCULATED.3IONS-SCNC: 9 MMOL/L (ref 4–13)
AST SERPL W P-5'-P-CCNC: 22 U/L (ref 5–45)
BILIRUB SERPL-MCNC: 0.4 MG/DL (ref 0.2–1)
BUN SERPL-MCNC: 17 MG/DL (ref 5–25)
CALCIUM SERPL-MCNC: 9.9 MG/DL (ref 8.3–10.1)
CHLORIDE SERPL-SCNC: 105 MMOL/L (ref 100–108)
CHOLEST SERPL-MCNC: 213 MG/DL (ref 50–200)
CO2 SERPL-SCNC: 29 MMOL/L (ref 21–32)
CREAT SERPL-MCNC: 0.82 MG/DL (ref 0.6–1.3)
EST. AVERAGE GLUCOSE BLD GHB EST-MCNC: 120 MG/DL
GFR SERPL CREATININE-BSD FRML MDRD: 79 ML/MIN/1.73SQ M
GLUCOSE P FAST SERPL-MCNC: 105 MG/DL (ref 65–99)
HBA1C MFR BLD: 5.8 % (ref 4.2–6.3)
HCV AB SER QL: NORMAL
HDLC SERPL-MCNC: 89 MG/DL (ref 40–60)
LDLC SERPL CALC-MCNC: 105 MG/DL (ref 0–100)
NONHDLC SERPL-MCNC: 124 MG/DL
POTASSIUM SERPL-SCNC: 3.8 MMOL/L (ref 3.5–5.3)
PROT SERPL-MCNC: 8 G/DL (ref 6.4–8.2)
SODIUM SERPL-SCNC: 143 MMOL/L (ref 136–145)
TRIGL SERPL-MCNC: 94 MG/DL

## 2019-04-03 PROCEDURE — 3044F HG A1C LEVEL LT 7.0%: CPT | Performed by: INTERNAL MEDICINE

## 2019-04-03 PROCEDURE — 73130 X-RAY EXAM OF HAND: CPT

## 2019-04-03 PROCEDURE — 36415 COLL VENOUS BLD VENIPUNCTURE: CPT | Performed by: INTERNAL MEDICINE

## 2019-04-03 PROCEDURE — 80061 LIPID PANEL: CPT

## 2019-04-03 PROCEDURE — 80053 COMPREHEN METABOLIC PANEL: CPT

## 2019-04-03 PROCEDURE — 82306 VITAMIN D 25 HYDROXY: CPT

## 2019-04-03 PROCEDURE — 83036 HEMOGLOBIN GLYCOSYLATED A1C: CPT | Performed by: INTERNAL MEDICINE

## 2019-04-03 PROCEDURE — 86803 HEPATITIS C AB TEST: CPT

## 2019-04-04 ENCOUNTER — TELEPHONE (OUTPATIENT)
Dept: INTERNAL MEDICINE CLINIC | Facility: CLINIC | Age: 59
End: 2019-04-04

## 2019-04-05 ENCOUNTER — TELEPHONE (OUTPATIENT)
Dept: INTERNAL MEDICINE CLINIC | Facility: CLINIC | Age: 59
End: 2019-04-05

## 2019-04-05 DIAGNOSIS — M79.642 PAIN IN BOTH HANDS: Primary | ICD-10-CM

## 2019-04-05 DIAGNOSIS — M79.641 PAIN IN BOTH HANDS: Primary | ICD-10-CM

## 2019-04-29 ENCOUNTER — EVALUATION (OUTPATIENT)
Dept: OCCUPATIONAL THERAPY | Age: 59
End: 2019-04-29
Payer: COMMERCIAL

## 2019-04-29 DIAGNOSIS — M79.642 LEFT HAND PAIN: ICD-10-CM

## 2019-04-29 DIAGNOSIS — M79.641 PAIN IN RIGHT HAND: Primary | ICD-10-CM

## 2019-04-29 PROCEDURE — 97165 OT EVAL LOW COMPLEX 30 MIN: CPT

## 2019-05-06 ENCOUNTER — OFFICE VISIT (OUTPATIENT)
Dept: OCCUPATIONAL THERAPY | Age: 59
End: 2019-05-06
Payer: COMMERCIAL

## 2019-05-06 DIAGNOSIS — M79.641 PAIN IN RIGHT HAND: Primary | ICD-10-CM

## 2019-05-06 PROCEDURE — 97140 MANUAL THERAPY 1/> REGIONS: CPT

## 2019-05-06 PROCEDURE — 97110 THERAPEUTIC EXERCISES: CPT

## 2019-05-06 PROCEDURE — 97018 PARAFFIN BATH THERAPY: CPT

## 2019-05-15 ENCOUNTER — OFFICE VISIT (OUTPATIENT)
Dept: URGENT CARE | Age: 59
End: 2019-05-15
Payer: COMMERCIAL

## 2019-05-15 VITALS
HEART RATE: 88 BPM | WEIGHT: 145 LBS | HEIGHT: 62 IN | SYSTOLIC BLOOD PRESSURE: 145 MMHG | OXYGEN SATURATION: 98 % | TEMPERATURE: 99.3 F | DIASTOLIC BLOOD PRESSURE: 71 MMHG | RESPIRATION RATE: 18 BRPM | BODY MASS INDEX: 26.68 KG/M2

## 2019-05-15 DIAGNOSIS — J02.9 SORE THROAT: Primary | ICD-10-CM

## 2019-05-15 DIAGNOSIS — R05.9 COUGH: ICD-10-CM

## 2019-05-15 LAB — S PYO AG THROAT QL: NEGATIVE

## 2019-05-15 PROCEDURE — 87430 STREP A AG IA: CPT | Performed by: FAMILY MEDICINE

## 2019-05-15 PROCEDURE — G0382 LEV 3 HOSP TYPE B ED VISIT: HCPCS | Performed by: FAMILY MEDICINE

## 2019-05-15 PROCEDURE — 87070 CULTURE OTHR SPECIMN AEROBIC: CPT | Performed by: NURSE PRACTITIONER

## 2019-05-15 RX ORDER — DEXTROMETHORPHAN HYDROBROMIDE AND PROMETHAZINE HYDROCHLORIDE 15; 6.25 MG/5ML; MG/5ML
5 SYRUP ORAL 4 TIMES DAILY PRN
Qty: 118 ML | Refills: 0 | Status: SHIPPED | OUTPATIENT
Start: 2019-05-15 | End: 2019-05-22

## 2019-05-15 RX ORDER — PREDNISONE 10 MG/1
TABLET ORAL
Qty: 21 TABLET | Refills: 0 | Status: SHIPPED | OUTPATIENT
Start: 2019-05-15 | End: 2019-06-17 | Stop reason: ALTCHOICE

## 2019-05-17 LAB — BACTERIA THROAT CULT: NORMAL

## 2019-05-20 DIAGNOSIS — E78.2 MIXED HYPERLIPIDEMIA: ICD-10-CM

## 2019-05-20 RX ORDER — ATORVASTATIN CALCIUM 20 MG/1
TABLET, FILM COATED ORAL
Qty: 90 TABLET | Refills: 1 | Status: SHIPPED | OUTPATIENT
Start: 2019-05-20 | End: 2019-11-23 | Stop reason: SDUPTHER

## 2019-05-28 ENCOUNTER — TELEPHONE (OUTPATIENT)
Dept: INTERNAL MEDICINE CLINIC | Facility: CLINIC | Age: 59
End: 2019-05-28

## 2019-05-31 DIAGNOSIS — K21.9 GASTROESOPHAGEAL REFLUX DISEASE WITHOUT ESOPHAGITIS: ICD-10-CM

## 2019-05-31 RX ORDER — RABEPRAZOLE SODIUM 20 MG/1
TABLET, DELAYED RELEASE ORAL
Qty: 90 TABLET | Refills: 1 | Status: SHIPPED | OUTPATIENT
Start: 2019-05-31 | End: 2019-12-06 | Stop reason: SDUPTHER

## 2019-06-17 ENCOUNTER — OFFICE VISIT (OUTPATIENT)
Dept: INTERNAL MEDICINE CLINIC | Facility: CLINIC | Age: 59
End: 2019-06-17
Payer: COMMERCIAL

## 2019-06-17 VITALS
BODY MASS INDEX: 26.31 KG/M2 | HEIGHT: 62 IN | TEMPERATURE: 98.9 F | OXYGEN SATURATION: 99 % | WEIGHT: 143 LBS | RESPIRATION RATE: 18 BRPM | HEART RATE: 82 BPM | DIASTOLIC BLOOD PRESSURE: 74 MMHG | SYSTOLIC BLOOD PRESSURE: 128 MMHG

## 2019-06-17 DIAGNOSIS — J02.9 SORE THROAT: Primary | ICD-10-CM

## 2019-06-17 DIAGNOSIS — H65.91 FLUID LEVEL BEHIND TYMPANIC MEMBRANE OF RIGHT EAR: ICD-10-CM

## 2019-06-17 LAB — S PYO AG THROAT QL: NEGATIVE

## 2019-06-17 PROCEDURE — 87880 STREP A ASSAY W/OPTIC: CPT | Performed by: INTERNAL MEDICINE

## 2019-06-17 PROCEDURE — 3008F BODY MASS INDEX DOCD: CPT | Performed by: INTERNAL MEDICINE

## 2019-06-17 PROCEDURE — 99213 OFFICE O/P EST LOW 20 MIN: CPT | Performed by: INTERNAL MEDICINE

## 2019-06-17 RX ORDER — LIDOCAINE HYDROCHLORIDE 20 MG/ML
15 SOLUTION OROPHARYNGEAL 4 TIMES DAILY PRN
Qty: 100 ML | Refills: 0 | Status: SHIPPED | OUTPATIENT
Start: 2019-06-17 | End: 2019-11-22 | Stop reason: ALTCHOICE

## 2019-06-27 ENCOUNTER — TELEPHONE (OUTPATIENT)
Dept: INTERNAL MEDICINE CLINIC | Facility: CLINIC | Age: 59
End: 2019-06-27

## 2019-06-27 DIAGNOSIS — J06.9 UPPER RESPIRATORY TRACT INFECTION, UNSPECIFIED TYPE: Primary | ICD-10-CM

## 2019-06-27 RX ORDER — AMOXICILLIN AND CLAVULANATE POTASSIUM 875; 125 MG/1; MG/1
1 TABLET, FILM COATED ORAL EVERY 12 HOURS SCHEDULED
Qty: 14 TABLET | Refills: 0 | Status: SHIPPED | OUTPATIENT
Start: 2019-06-27 | End: 2019-07-04

## 2019-09-22 DIAGNOSIS — I10 ESSENTIAL HYPERTENSION: ICD-10-CM

## 2019-09-22 RX ORDER — LISINOPRIL 10 MG/1
TABLET ORAL
Qty: 90 TABLET | Refills: 0 | Status: SHIPPED | OUTPATIENT
Start: 2019-09-22 | End: 2019-12-16 | Stop reason: SDUPTHER

## 2019-11-01 ENCOUNTER — CLINICAL SUPPORT (OUTPATIENT)
Dept: INTERNAL MEDICINE CLINIC | Facility: CLINIC | Age: 59
End: 2019-11-01
Payer: COMMERCIAL

## 2019-11-01 DIAGNOSIS — Z23 NEED FOR INFLUENZA VACCINATION: Primary | ICD-10-CM

## 2019-11-01 PROCEDURE — 90682 RIV4 VACC RECOMBINANT DNA IM: CPT

## 2019-11-01 PROCEDURE — 90471 IMMUNIZATION ADMIN: CPT

## 2019-11-22 ENCOUNTER — OFFICE VISIT (OUTPATIENT)
Dept: INTERNAL MEDICINE CLINIC | Facility: CLINIC | Age: 59
End: 2019-11-22
Payer: COMMERCIAL

## 2019-11-22 VITALS
HEART RATE: 73 BPM | RESPIRATION RATE: 18 BRPM | WEIGHT: 148.6 LBS | BODY MASS INDEX: 27.34 KG/M2 | OXYGEN SATURATION: 98 % | TEMPERATURE: 98.2 F | DIASTOLIC BLOOD PRESSURE: 74 MMHG | HEIGHT: 62 IN | SYSTOLIC BLOOD PRESSURE: 112 MMHG

## 2019-11-22 DIAGNOSIS — E78.49 OTHER HYPERLIPIDEMIA: ICD-10-CM

## 2019-11-22 DIAGNOSIS — E55.9 VITAMIN D DEFICIENCY: ICD-10-CM

## 2019-11-22 DIAGNOSIS — K21.9 GASTROESOPHAGEAL REFLUX DISEASE WITHOUT ESOPHAGITIS: ICD-10-CM

## 2019-11-22 DIAGNOSIS — Z00.00 HEALTH MAINTENANCE EXAMINATION: Primary | ICD-10-CM

## 2019-11-22 DIAGNOSIS — E66.3 OVERWEIGHT: ICD-10-CM

## 2019-11-22 DIAGNOSIS — I10 ESSENTIAL HYPERTENSION: ICD-10-CM

## 2019-11-22 DIAGNOSIS — R73.03 PREDIABETES: ICD-10-CM

## 2019-11-22 PROBLEM — R23.2 HOT FLASHES: Status: ACTIVE | Noted: 2019-11-22

## 2019-11-22 PROBLEM — K57.30 DIVERTICULOSIS OF COLON: Status: ACTIVE | Noted: 2019-11-22

## 2019-11-22 PROBLEM — N60.19 DIFFUSE CYSTIC MASTOPATHY: Status: ACTIVE | Noted: 2019-11-22

## 2019-11-22 PROBLEM — K64.8 INTERNAL HEMORRHOIDS: Status: ACTIVE | Noted: 2019-11-22

## 2019-11-22 PROCEDURE — 99396 PREV VISIT EST AGE 40-64: CPT | Performed by: INTERNAL MEDICINE

## 2019-11-22 NOTE — PATIENT INSTRUCTIONS
Schedule an appointment with gynecology  May try soy milk in the evening  Can also try black cohosh

## 2019-11-22 NOTE — ASSESSMENT & PLAN NOTE
LMP January 2019  May try soy milk, black cohosh for symptoms  Schedule appointment with gynecology

## 2019-11-22 NOTE — PROGRESS NOTES
Assessment/Plan:    Essential hypertension  BP controlled on lisinopril  Other hyperlipidemia  On statin  Acid reflux disease  Takes PPI daily  Overweight  Gained 4 lbs since last visit  Maintain current weight  Hot flashes  LMP January 2019  May try soy milk, black cohosh for symptoms  Schedule appointment with gynecology  Diagnoses and all orders for this visit:    Health maintenance examination  Comments:  Flu vaccine, mammogram updated  Other hyperlipidemia  -     Comprehensive metabolic panel; Future  -     Lipid panel; Future  -     TSH, 3rd generation with Free T4 reflex; Future    Essential hypertension  -     CBC and differential; Future  -     Comprehensive metabolic panel; Future  -     TSH, 3rd generation with Free T4 reflex; Future    Gastroesophageal reflux disease without esophagitis    Vitamin D deficiency    Overweight    Prediabetes  -     Hemoglobin A1C; Future      Follow up in 6 months or as needed  Subjective:      Patient ID: Jose G Heck is a 61 y o  female  Mrs Emani Mendosa complains of hot flashes  Symptoms worsen evening, she would experience hot and cold episodes  This would frequently disrupt sleep  She has not tried any over-the-counter medication  She reports her last menstrual period was January of this year  Denies any vaginal discharge, no urinary symptoms  She reports increased sexual drive recently  She continues to exercise regularly, has gained a few lb since her last visit  She denies any cold symptoms, no chest pain, palpitations or other symptoms  The following portions of the patient's history were reviewed and updated as appropriate: allergies, current medications, past medical history, past social history and problem list     Review of Systems   Constitutional: Negative for appetite change and fatigue  HENT: Negative for congestion and ear pain  Eyes: Negative for visual disturbance     Respiratory: Negative for cough and shortness of breath  Cardiovascular: Negative for chest pain and leg swelling  Gastrointestinal: Negative for abdominal pain, constipation and diarrhea  Genitourinary: Negative for dysuria and frequency  Musculoskeletal: Negative for arthralgias and myalgias  Skin: Negative for rash and wound  Neurological: Negative for dizziness and headaches  Psychiatric/Behavioral: Positive for sleep disturbance  Negative for confusion and dysphoric mood  The patient is not nervous/anxious  Objective:      /74   Pulse 73   Temp 98 2 °F (36 8 °C) (Oral)   Resp 18   Ht 5' 2" (1 575 m)   Wt 67 4 kg (148 lb 9 6 oz)   SpO2 98%   BMI 27 18 kg/m²          Physical Exam   Constitutional: She is oriented to person, place, and time  She appears well-developed and well-nourished  HENT:   Head: Normocephalic and atraumatic  Nose: Nose normal    Eyes: Pupils are equal, round, and reactive to light  Cardiovascular: Normal rate, regular rhythm and normal heart sounds  Pulmonary/Chest: Effort normal and breath sounds normal  She has no wheezes  Abdominal: Soft  Bowel sounds are normal    Musculoskeletal: She exhibits no edema  Neurological: She is alert and oriented to person, place, and time  Skin: Skin is warm  Psychiatric: She has a normal mood and affect  Her behavior is normal    Nursing note and vitals reviewed  Lab results reviewed with patient

## 2019-11-23 DIAGNOSIS — E78.2 MIXED HYPERLIPIDEMIA: ICD-10-CM

## 2019-11-24 RX ORDER — ATORVASTATIN CALCIUM 20 MG/1
TABLET, FILM COATED ORAL
Qty: 90 TABLET | Refills: 1 | Status: SHIPPED | OUTPATIENT
Start: 2019-11-24 | End: 2020-05-20

## 2019-12-06 DIAGNOSIS — K21.9 GASTROESOPHAGEAL REFLUX DISEASE WITHOUT ESOPHAGITIS: ICD-10-CM

## 2019-12-06 RX ORDER — RABEPRAZOLE SODIUM 20 MG/1
TABLET, DELAYED RELEASE ORAL
Qty: 90 TABLET | Refills: 1 | Status: SHIPPED | OUTPATIENT
Start: 2019-12-06 | End: 2020-06-03

## 2019-12-16 DIAGNOSIS — I10 ESSENTIAL HYPERTENSION: ICD-10-CM

## 2019-12-16 RX ORDER — LISINOPRIL 10 MG/1
TABLET ORAL
Qty: 90 TABLET | Refills: 1 | Status: SHIPPED | OUTPATIENT
Start: 2019-12-16 | End: 2020-06-14

## 2020-03-25 ENCOUNTER — TELEPHONE (OUTPATIENT)
Dept: INTERNAL MEDICINE CLINIC | Facility: CLINIC | Age: 60
End: 2020-03-25

## 2020-03-25 NOTE — TELEPHONE ENCOUNTER
Pt  had fever,chills and body aches on Monday  Fever was 100 2  Tuesday fever went down to 100  Tamp was 98 8 today  Doesn't have a cough or sob  She does  have diarrhea since Monday -has been taking Immodium  No abd  pain  She did eat shrimp on Sunday from a Restaurant, doesn't know if it is from that  Will Monitor symptoms  Is taking Tylenol and Advil  Will call later tomorrow afternoon if still with symptoms

## 2020-03-26 NOTE — TELEPHONE ENCOUNTER
The fever went away  Diarrhea is still there  This morning she went 2x  Yesterday she went a lot  It is less watery  Pepto seems to be helping her  Imodium she took yesterday and didn't help at all  Patient is staying well hydrated  Not too hungry  She does not feel sick

## 2020-03-27 ENCOUNTER — TELEMEDICINE (OUTPATIENT)
Dept: INTERNAL MEDICINE CLINIC | Facility: CLINIC | Age: 60
End: 2020-03-27
Payer: COMMERCIAL

## 2020-03-27 ENCOUNTER — TELEPHONE (OUTPATIENT)
Dept: INTERNAL MEDICINE CLINIC | Facility: CLINIC | Age: 60
End: 2020-03-27

## 2020-03-27 DIAGNOSIS — R50.9 FEVER, UNSPECIFIED FEVER CAUSE: Primary | ICD-10-CM

## 2020-03-27 DIAGNOSIS — R19.7 DIARRHEA, UNSPECIFIED TYPE: ICD-10-CM

## 2020-03-27 PROCEDURE — 99213 OFFICE O/P EST LOW 20 MIN: CPT | Performed by: INTERNAL MEDICINE

## 2020-03-27 NOTE — TELEPHONE ENCOUNTER
Fever is better-the highest has been 100-only sometimes  Still has diarrhea  OTC-Immodium and Pepto isn't helping  Can you prescribe something stronger?

## 2020-03-27 NOTE — PROGRESS NOTES
Virtual Brief Visit    Problem List Items Addressed This Visit     None      Visit Diagnoses     Fever, unspecified fever cause    -  Primary    May continue with Tylenol or NSAID prn  Monitor fevers  Diarrhea, unspecified type        Stop Pepto-Bismol, she has stopped Imodium  Keep hydrated, continue bland diet  If fevers persist next week, screen for COVID  Explained to patient, agrees with plan  Reason for visit is fever, diarrhea  Encounter provider Edinson Salas MD    Provider located at [address] [place] [address] [address] [facility] [id number]      Recent Visits  Date Type Provider Dept   03/25/20 Telephone [facility]   Showing recent visits within past 7 days and meeting all other requirements     Today's Visits  Date Type Provider Dept   03/27/20 Telephone [facility]   Showing today's visits and meeting all other requirements     Future Appointments  Date Type Provider Dept   03/27/20 Telephone [facility]   Showing future appointments within next 150 days and meeting all other requirements        After connecting through telephone, the patient was identified by name and date of birth  Debbie Ramires was informed that this is a telemedicine visit and that the visit is being conducted through telephone  My office door was closed  No one else was in the room  She acknowledged consent and understanding of privacy and security of the video platform  The patient has agreed to participate and understands they can discontinue the visit at any time  Patient is aware this is a billable service  Subjective  Debbie Ramires is a 61 y o  female fever and diarrhea  Ms Lupe Schreiber is still complaining of diarrhea and fevers  She reports symptoms started 4 days ago with fevers of 100 2  She reports loose stools, formed then sometimes watery    She would move her bowels more than twice a day  She denies any nausea, vomiting, abdominal pain or cramping  For the next few days, symptoms persisted  She was instructed to stop taking Imodium, continues to take Pepto-Bismol at least once a day  She alternates Advil and Tylenol at least once a day for the fevers  Today, she calls again due to a temperature of 102 5°  She has not taken Advil yet today  She can't reports still with loose stools  She denies any cough, shortness of breath or cold symptoms  She feels well other than her fever and diarrhea  No known sick contacts  She has been to the grocery in the past week  Past Medical History:   Diagnosis Date    GERD (gastroesophageal reflux disease)     Hyperlipidemia     Hypertension     Hypokalemia 2015    Obesity     PONV (postoperative nausea and vomiting)        Past Surgical History:   Procedure Laterality Date    BREAST BIOPSY Left 11/15/2011    Ultrasound guided biopsy    BREAST BIOPSY Left 2011    Ultrasound guided biopsy    BREAST SURGERY      reduction     SECTION      HEMORROIDECTOMY      OK EGD TRANSORAL BIOPSY SINGLE/MULTIPLE N/A 2016    Procedure: ESOPHAGOGASTRODUODENOSCOPY (EGD); Surgeon: Cira Read MD;  [facility]; Service: General    REDUCTION MAMMAPLASTY Bilateral 2015    TUBAL LIGATION         Current Outpatient Medications   Medication Sig Dispense Refill    atorvastatin (LIPITOR) 20 mg tablet TAKE 1 TABLET BY MOUTH EVERY DAY 90 tablet 1    lisinopril (ZESTRIL) 10 mg tablet TAKE 1 TABLET BY MOUTH EVERY DAY 90 tablet 1    RABEprazole (ACIPHEX) 20 MG tablet TAKE 1 TABLET BY MOUTH EVERY DAY 90 tablet 1     No current facility-administered medications for this visit  No Known Allergies    Review of Systems   Constitutional: Positive for fever  Negative for activity change, appetite change and fatigue     HENT: Negative for congestion, postnasal drip and rhinorrhea  Respiratory: Negative for cough and shortness of breath  Gastrointestinal: Positive for diarrhea  Negative for abdominal pain, anal bleeding, constipation, nausea and vomiting  Musculoskeletal: Negative for arthralgias and myalgias  Neurological: Negative for dizziness

## 2020-03-27 NOTE — TELEPHONE ENCOUNTER
You should stop taking Imodium  You need to get it out of your system  Only take Imodium if you are leaving the house  The Imodium may be making your diarrhea worse  You may take Pepcid or Pepto as needed  Stay hydrated

## 2020-03-30 ENCOUNTER — TELEPHONE (OUTPATIENT)
Dept: INTERNAL MEDICINE CLINIC | Facility: CLINIC | Age: 60
End: 2020-03-30

## 2020-03-31 ENCOUNTER — TELEMEDICINE (OUTPATIENT)
Dept: INTERNAL MEDICINE CLINIC | Facility: CLINIC | Age: 60
End: 2020-03-31
Payer: COMMERCIAL

## 2020-03-31 DIAGNOSIS — R19.7 DIARRHEA, UNSPECIFIED TYPE: Primary | ICD-10-CM

## 2020-03-31 DIAGNOSIS — R50.9 FEVER, UNSPECIFIED FEVER CAUSE: ICD-10-CM

## 2020-03-31 PROCEDURE — 99213 OFFICE O/P EST LOW 20 MIN: CPT | Performed by: INTERNAL MEDICINE

## 2020-03-31 NOTE — PROGRESS NOTES
Virtual Brief Visit    Problem List Items Addressed This Visit     None      Visit Diagnoses     Diarrhea, unspecified type    -  Primary    Improving, keep hydrated  Monitor if black stools recur  Fever, unspecified fever cause        Resolved  Discussed COVID testing  Since no more fevers and feeling better, she does not feel she needs to be tested  Recommend to stay at home as much as possible, minimize trips outside the home including grocery shopping  Reason for visit is follow up diarrhea / fever  Encounter provider Katie Saucedo MD    Provider located at [address] [place] [id number]      Recent Visits  Date Type Provider Dept   03/30/20 Telephone Katie Saucedo, [facility]   03/27/20 [address] [facility], [facility]   03/27/20 Telephone [facility]   03/25/20 Telephone [facility]   Showing recent visits within past 7 days and meeting all other requirements     Future Appointments  No visits were found meeting these conditions  Showing future appointments within next 150 days and meeting all other requirements        After connecting through telephone, the patient was identified by name and date of birth  Leisa Benito was informed that this is a telemedicine visit and that the visit is being conducted through telephone  My office door was closed  No one else was in the room  She acknowledged consent and understanding of privacy and security of the video platform  The patient has agreed to participate and understands they can discontinue the visit at any time  Patient is aware this is a billable service  Subjective  Leisa Benito is a 61 y o  female follow up  Mrs Osorio Economy is feeling better  She reports that she has had no further fevers since 4 days ago    Previous to that, she was experiencing daily fevers for 4 days, T-max of 102 5°  She reports diarrhea has improved  She reports stools are not as watery, now has solid stool  She notice 1 episode of black stools, mostly brown  Denies any blood or mucus in the stool  She denies any cough, shortness of breath or sore throat  Denies any ear pain or postnasal drip  She says she has had a sniffle since early January  She is otherwise feeling well  She is not taking any over-the-counter medication for symptoms  She asked whether she is contagious  She does leave the house for grocery shopping  Past Medical History:   Diagnosis Date    GERD (gastroesophageal reflux disease)     Hyperlipidemia     Hypertension     Hypokalemia 2015    Obesity     PONV (postoperative nausea and vomiting)        Past Surgical History:   Procedure Laterality Date    BREAST BIOPSY Left 11/15/2011    Ultrasound guided biopsy    BREAST BIOPSY Left 2011    Ultrasound guided biopsy    BREAST SURGERY      reduction     SECTION      HEMORROIDECTOMY      [place] EGD TRANSORAL BIOPSY SINGLE/MULTIPLE N/A 2016    Procedure: ESOPHAGOGASTRODUODENOSCOPY (EGD); Surgeon: Linda Crowe MD;  [facility]; Service: General    REDUCTION MAMMAPLASTY Bilateral 2015    TUBAL LIGATION         Current Outpatient Medications   Medication Sig Dispense Refill    atorvastatin (LIPITOR) 20 mg tablet TAKE 1 TABLET BY MOUTH EVERY DAY 90 tablet 1    lisinopril (ZESTRIL) 10 mg tablet TAKE 1 TABLET BY MOUTH EVERY DAY 90 tablet 1    RABEprazole (ACIPHEX) 20 MG tablet TAKE 1 TABLET BY MOUTH EVERY DAY 90 tablet 1     No current facility-administered medications for this visit  No Known Allergies    Review of Systems   Constitutional: Negative for activity change, appetite change, fatigue and fever  HENT: Positive for rhinorrhea  Negative for congestion, postnasal drip, sinus pressure, sneezing and sore throat  Respiratory: Negative for cough and shortness of breath  Cardiovascular: Negative for chest pain  Gastrointestinal: Positive for diarrhea  Negative for abdominal pain, blood in stool, nausea and vomiting  Genitourinary: Negative for dysuria  Musculoskeletal: Negative for arthralgias and myalgias  Neurological: Negative for dizziness and headaches

## 2020-05-06 ENCOUNTER — TELEPHONE (OUTPATIENT)
Dept: INTERNAL MEDICINE CLINIC | Facility: CLINIC | Age: 60
End: 2020-05-06

## 2020-05-06 DIAGNOSIS — R23.2 HOT FLASHES: Primary | ICD-10-CM

## 2020-05-06 RX ORDER — PAROXETINE 10 MG/1
10 TABLET, FILM COATED ORAL DAILY
Qty: 30 TABLET | Refills: 0 | Status: SHIPPED | OUTPATIENT
Start: 2020-05-06 | End: 2020-05-29

## 2020-05-20 DIAGNOSIS — E78.2 MIXED HYPERLIPIDEMIA: ICD-10-CM

## 2020-05-20 RX ORDER — ATORVASTATIN CALCIUM 20 MG/1
TABLET, FILM COATED ORAL
Qty: 90 TABLET | Refills: 1 | Status: SHIPPED | OUTPATIENT
Start: 2020-05-20 | End: 2020-11-30

## 2020-05-29 DIAGNOSIS — R23.2 HOT FLASHES: ICD-10-CM

## 2020-05-29 RX ORDER — PAROXETINE 10 MG/1
TABLET, FILM COATED ORAL
Qty: 90 TABLET | Refills: 0 | Status: SHIPPED | OUTPATIENT
Start: 2020-05-29 | End: 2020-08-23

## 2020-06-03 DIAGNOSIS — K21.9 GASTROESOPHAGEAL REFLUX DISEASE WITHOUT ESOPHAGITIS: ICD-10-CM

## 2020-06-03 RX ORDER — RABEPRAZOLE SODIUM 20 MG/1
TABLET, DELAYED RELEASE ORAL
Qty: 90 TABLET | Refills: 1 | Status: SHIPPED | OUTPATIENT
Start: 2020-06-03 | End: 2020-11-30

## 2020-06-14 DIAGNOSIS — I10 ESSENTIAL HYPERTENSION: ICD-10-CM

## 2020-06-14 RX ORDER — LISINOPRIL 10 MG/1
TABLET ORAL
Qty: 90 TABLET | Refills: 0 | Status: SHIPPED | OUTPATIENT
Start: 2020-06-14 | End: 2020-09-07

## 2020-06-26 ENCOUNTER — TELEMEDICINE (OUTPATIENT)
Dept: INTERNAL MEDICINE CLINIC | Facility: CLINIC | Age: 60
End: 2020-06-26
Payer: COMMERCIAL

## 2020-06-26 ENCOUNTER — TELEPHONE (OUTPATIENT)
Dept: INTERNAL MEDICINE CLINIC | Facility: CLINIC | Age: 60
End: 2020-06-26

## 2020-06-26 DIAGNOSIS — R19.7 DIARRHEA, UNSPECIFIED TYPE: Primary | ICD-10-CM

## 2020-06-26 DIAGNOSIS — R50.9 LOW GRADE FEVER: ICD-10-CM

## 2020-06-26 PROCEDURE — 99213 OFFICE O/P EST LOW 20 MIN: CPT | Performed by: NURSE PRACTITIONER

## 2020-07-15 ENCOUNTER — HOSPITAL ENCOUNTER (OUTPATIENT)
Dept: RADIOLOGY | Age: 60
Discharge: HOME | End: 2020-07-15
Payer: COMMERCIAL

## 2020-07-15 VITALS — BODY MASS INDEX: 26.68 KG/M2 | WEIGHT: 145 LBS | HEIGHT: 62 IN

## 2020-07-15 DIAGNOSIS — Z12.31 ENCOUNTER FOR SCREENING MAMMOGRAM FOR MALIGNANT NEOPLASM OF BREAST: ICD-10-CM

## 2020-07-15 PROCEDURE — 77067 SCR MAMMO BI INCL CAD: CPT

## 2020-07-15 PROCEDURE — 77063 BREAST TOMOSYNTHESIS BI: CPT

## 2020-08-22 DIAGNOSIS — R23.2 HOT FLASHES: ICD-10-CM

## 2020-08-23 RX ORDER — PAROXETINE 10 MG/1
TABLET, FILM COATED ORAL
Qty: 90 TABLET | Refills: 1 | Status: SHIPPED | OUTPATIENT
Start: 2020-08-23 | End: 2021-01-20 | Stop reason: SDUPTHER

## 2020-09-06 DIAGNOSIS — I10 ESSENTIAL HYPERTENSION: ICD-10-CM

## 2020-09-07 RX ORDER — LISINOPRIL 10 MG/1
TABLET ORAL
Qty: 90 TABLET | Refills: 0 | Status: SHIPPED | OUTPATIENT
Start: 2020-09-07 | End: 2020-11-30

## 2020-11-12 ENCOUNTER — TELEMEDICINE (OUTPATIENT)
Dept: INTERNAL MEDICINE CLINIC | Facility: CLINIC | Age: 60
End: 2020-11-12
Payer: COMMERCIAL

## 2020-11-12 ENCOUNTER — LAB (OUTPATIENT)
Dept: LAB | Facility: HOSPITAL | Age: 60
End: 2020-11-12
Payer: COMMERCIAL

## 2020-11-12 DIAGNOSIS — R30.0 BURNING WITH URINATION: Primary | ICD-10-CM

## 2020-11-12 PROCEDURE — 1036F TOBACCO NON-USER: CPT | Performed by: NURSE PRACTITIONER

## 2020-11-12 PROCEDURE — 99213 OFFICE O/P EST LOW 20 MIN: CPT | Performed by: NURSE PRACTITIONER

## 2020-11-12 PROCEDURE — 81001 URINALYSIS AUTO W/SCOPE: CPT | Performed by: NURSE PRACTITIONER

## 2020-11-12 RX ORDER — NITROFURANTOIN 25; 75 MG/1; MG/1
100 CAPSULE ORAL 2 TIMES DAILY
Qty: 10 CAPSULE | Refills: 0 | Status: SHIPPED | OUTPATIENT
Start: 2020-11-12 | End: 2020-11-17

## 2020-11-13 LAB
BACTERIA UR QL AUTO: ABNORMAL /HPF
BILIRUB UR QL STRIP: NEGATIVE
CAOX CRY URNS QL MICRO: ABNORMAL /HPF
CLARITY UR: ABNORMAL
COLOR UR: YELLOW
GLUCOSE UR STRIP-MCNC: NEGATIVE MG/DL
HGB UR QL STRIP.AUTO: ABNORMAL
KETONES UR STRIP-MCNC: NEGATIVE MG/DL
LEUKOCYTE ESTERASE UR QL STRIP: ABNORMAL
NITRITE UR QL STRIP: POSITIVE
NON-SQ EPI CELLS URNS QL MICRO: ABNORMAL /HPF
PH UR STRIP.AUTO: 6 [PH]
PROT UR STRIP-MCNC: ABNORMAL MG/DL
RBC #/AREA URNS AUTO: ABNORMAL /HPF
SP GR UR STRIP.AUTO: 1.02 (ref 1–1.03)
UROBILINOGEN UR QL STRIP.AUTO: 0.2 E.U./DL
WBC #/AREA URNS AUTO: ABNORMAL /HPF

## 2020-11-15 ENCOUNTER — TRANSCRIBE ORDERS (OUTPATIENT)
Dept: LAB | Facility: HOSPITAL | Age: 60
End: 2020-11-15

## 2020-11-15 DIAGNOSIS — R30.0 DYSURIA: Primary | ICD-10-CM

## 2020-11-18 ENCOUNTER — LAB (OUTPATIENT)
Dept: LAB | Facility: CLINIC | Age: 60
End: 2020-11-18
Payer: COMMERCIAL

## 2020-11-18 DIAGNOSIS — R30.0 DYSURIA: ICD-10-CM

## 2020-11-18 PROCEDURE — 87086 URINE CULTURE/COLONY COUNT: CPT

## 2020-11-19 LAB — BACTERIA UR CULT: NORMAL

## 2020-11-28 DIAGNOSIS — I10 ESSENTIAL HYPERTENSION: ICD-10-CM

## 2020-11-28 DIAGNOSIS — E78.2 MIXED HYPERLIPIDEMIA: ICD-10-CM

## 2020-11-28 DIAGNOSIS — K21.9 GASTROESOPHAGEAL REFLUX DISEASE WITHOUT ESOPHAGITIS: ICD-10-CM

## 2020-11-30 RX ORDER — RABEPRAZOLE SODIUM 20 MG/1
TABLET, DELAYED RELEASE ORAL
Qty: 90 TABLET | Refills: 0 | Status: SHIPPED | OUTPATIENT
Start: 2020-11-30 | End: 2021-03-01

## 2020-11-30 RX ORDER — ATORVASTATIN CALCIUM 20 MG/1
TABLET, FILM COATED ORAL
Qty: 90 TABLET | Refills: 0 | Status: SHIPPED | OUTPATIENT
Start: 2020-11-30 | End: 2021-03-01

## 2020-11-30 RX ORDER — LISINOPRIL 10 MG/1
TABLET ORAL
Qty: 90 TABLET | Refills: 0 | Status: SHIPPED | OUTPATIENT
Start: 2020-11-30 | End: 2021-01-20 | Stop reason: SDUPTHER

## 2021-01-12 ENCOUNTER — TELEMEDICINE (OUTPATIENT)
Dept: INTERNAL MEDICINE CLINIC | Facility: CLINIC | Age: 61
End: 2021-01-12
Payer: COMMERCIAL

## 2021-01-12 DIAGNOSIS — Z20.822 EXPOSURE TO COVID-19 VIRUS: ICD-10-CM

## 2021-01-12 DIAGNOSIS — B34.9 VIRAL INFECTION, UNSPECIFIED: ICD-10-CM

## 2021-01-12 PROCEDURE — U0003 INFECTIOUS AGENT DETECTION BY NUCLEIC ACID (DNA OR RNA); SEVERE ACUTE RESPIRATORY SYNDROME CORONAVIRUS 2 (SARS-COV-2) (CORONAVIRUS DISEASE [COVID-19]), AMPLIFIED PROBE TECHNIQUE, MAKING USE OF HIGH THROUGHPUT TECHNOLOGIES AS DESCRIBED BY CMS-2020-01-R: HCPCS | Performed by: NURSE PRACTITIONER

## 2021-01-12 PROCEDURE — 99214 OFFICE O/P EST MOD 30 MIN: CPT | Performed by: NURSE PRACTITIONER

## 2021-01-12 PROCEDURE — 3725F SCREEN DEPRESSION PERFORMED: CPT | Performed by: NURSE PRACTITIONER

## 2021-01-12 PROCEDURE — U0005 INFEC AGEN DETEC AMPLI PROBE: HCPCS | Performed by: NURSE PRACTITIONER

## 2021-01-12 NOTE — PROGRESS NOTES
COVID-19 Virtual Visit     Assessment/Plan:    Problem List Items Addressed This Visit     None      Visit Diagnoses     Exposure to COVID-19 virus        Relevant Orders    Novel Coronavirus (COVID-19), PCR [facility] - Collected at [facility] or [facility]    Viral infection, unspecified        Relevant Orders    Novel Coronavirus (COVID-19), PCR [facility] - [facility] or [facility]         Disposition:     I referred patient to one of our centralized sites for a COVID-19 swab  Self quarantine until results  I have spent 6 minutes directly with the patient  Greater than 50% of this time was spent in counseling/coordination of care regarding: instructions for management and patient and family education  Encounter provider JENNIFER Pimentel    Provider located at [address] [place] [phone]    Recent Visits  No visits were found meeting these conditions  Showing recent visits within past 7 days and meeting all other requirements     Today's Visits  Date Type Provider Dept   01/12/21 [facility], [address] Internal Med   Showing today's visits and meeting all other requirements     Future Appointments  No visits were found meeting these conditions  Showing future appointments within next 150 days and meeting all other requirements      This virtual check-in was done via [employer] and patient was informed that this is a secure, HIPAA-compliant platform  She agrees to proceed  Patient agrees to participate in a virtual check in via telephone or video visit instead of presenting to the office to address urgent/immediate medical needs  Patient is aware this is a billable service  After connecting through [facility], the patient was identified by name and date of birth   Luciana Rodartericardo was informed that this was a telemedicine visit and that the exam was being conducted confidentially over secure lines  My office door was closed  No one else was in the room  Lou Sarah acknowledged consent and understanding of privacy and security of the telemedicine visit  I informed the patient that I have reviewed her record in Epic and presented the opportunity for her to ask any questions regarding the visit today  The patient agreed to participate  Subjective:   Lou Sarah is a 64 y o  female who is concerned about COVID-19  Patient's symptoms include chills, fatigue, malaise, nasal congestion, diarrhea and myalgias  Patient denies rhinorrhea, sore throat, cough, shortness of breath, chest tightness, nausea, vomiting and headaches  Date of symptom onset: 1/10/2021  Date of exposure: 1/6/2021    Exposure:   Contact with a person who is under investigation (PUI) for or who is positive for COVID-19 within the last 14 days?: Yes    Hospitalized recently for fever and/or lower respiratory symptoms?: No      Currently a healthcare worker that is involved in direct patient care?: No      Works in a special setting where the risk of COVID-19 transmission may be high? (this may include long-term care, correctional and assisted facilities; homeless shelters; assisted-living facilities and group homes ): No      Resident in a special setting where the risk of COVID-19 transmission may be high? (this may include long-term care, correctional and assisted facilities; homeless shelters; assisted-living facilities and group homes ): No      [facility]  Over the last few days she has developed chills, fatigue, body aches, nasal congestion, and diarrhea  She denies fevers or shortness of breath         No results found for: Rosanne Myrick, [address] [facility], [place] [address]  Past Medical History:   Diagnosis Date    GERD (gastroesophageal reflux disease)     Hyperlipidemia     Hypertension     Hypokalemia 03/13/2015    Obesity     PONV (postoperative nausea and vomiting) Past Surgical History:   Procedure Laterality Date    BREAST BIOPSY Left 11/15/2011    Ultrasound guided biopsy    BREAST BIOPSY Left 2011    Ultrasound guided biopsy    BREAST SURGERY      reduction     SECTION      HEMORROIDECTOMY      ND EGD TRANSORAL BIOPSY SINGLE/MULTIPLE N/A 2016    Procedure: ESOPHAGOGASTRODUODENOSCOPY (EGD); Surgeon: Murali Torres MD;  Location:  GI LAB; Service: General    REDUCTION MAMMAPLASTY Bilateral 2015    TUBAL LIGATION       Current Outpatient Medications   Medication Sig Dispense Refill    atorvastatin (LIPITOR) 20 mg tablet TAKE 1 TABLET BY MOUTH EVERY DAY 90 tablet 0    lisinopril (ZESTRIL) 10 mg tablet TAKE 1 TABLET BY MOUTH EVERY DAY 90 tablet 0    PARoxetine (PAXIL) 10 mg tablet TAKE 1 TABLET BY MOUTH EVERY DAY 90 tablet 1    RABEprazole (ACIPHEX) 20 MG tablet TAKE 1 TABLET BY MOUTH EVERY DAY 90 tablet 0     No current facility-administered medications for this visit  No Known Allergies    Review of Systems   Constitutional: Positive for chills and fatigue  HENT: Positive for congestion  Negative for rhinorrhea and sore throat  Respiratory: Negative for cough, chest tightness and shortness of breath  Gastrointestinal: Positive for diarrhea  Negative for nausea and vomiting  Musculoskeletal: Positive for myalgias  Neurological: Negative for headaches  Objective: There were no vitals filed for this visit  Physical Exam  Constitutional:       Appearance: She is well-developed  HENT:      Head: Normocephalic  Eyes:      Pupils: Pupils are equal, round, and reactive to light  Pulmonary:      Effort: Pulmonary effort is normal    Neurological:      Mental Status: She is alert  Psychiatric:         Behavior: Behavior normal        VIRTUAL VISIT DISCLAIMER    Jose G Heck acknowledges that she has consented to an online visit or consultation   She understands that the online visit is based solely on information provided by her, and that, in the absence of a face-to-face physical evaluation by the physician, the diagnosis she receives is both limited and provisional in terms of accuracy and completeness  This is not intended to replace a full medical face-to-face evaluation by the physician  Ole Higgins understands and accepts these terms

## 2021-01-13 LAB — SARS-COV-2 RNA SPEC QL NAA+PROBE: DETECTED

## 2021-01-14 ENCOUNTER — TELEMEDICINE (OUTPATIENT)
Dept: INTERNAL MEDICINE CLINIC | Facility: CLINIC | Age: 61
End: 2021-01-14
Payer: COMMERCIAL

## 2021-01-14 DIAGNOSIS — U07.1 COVID-19 VIRUS INFECTION: Primary | ICD-10-CM

## 2021-01-14 PROCEDURE — 99212 OFFICE O/P EST SF 10 MIN: CPT | Performed by: NURSE PRACTITIONER

## 2021-01-14 NOTE — ASSESSMENT & PLAN NOTE
Continue symptomatic treatment  Monitor pulse ox  Advised to go to the ER for <90%  Stay well hydrated

## 2021-01-14 NOTE — PROGRESS NOTES
COVID-19 Virtual Visit     Assessment/Plan:    Problem List Items Addressed This Visit        Other    COVID-19 virus infection - Primary     Continue symptomatic treatment  Monitor pulse ox  Advised to go to the ER for <90%  Stay well hydrated  Disposition:     I recommended continued isolation until at least 24 hours have passed since recovery defined as resolution of fever without the use of fever-reducing medications AND improvement in COVID symptoms AND 10 days have passed since onset of symptoms (or 10 days have passed since date of first positive viral diagnostic test for asymptomatic patients)  I have spent 8 minutes directly with the patient  Greater than 50% of this time was spent in counseling/coordination of care regarding: instructions for management and patient and family education  Encounter provider Adolfo Gowers, CRNP    Provider located at [address] [place] [phone]    Recent Visits  Date Type Provider Dept   01/12/21 Telemedicine Adolfo Gowers, [address] Internal Med   Showing recent visits within past 7 days and meeting all other requirements     Today's Visits  Date Type Provider Dept   01/14/21 [facility] [place] [address] [address] Internal Med   Showing today's visits and meeting all other requirements     Future Appointments  No visits were found meeting these conditions  Showing future appointments within next 150 days and meeting all other requirements      This virtual check-in was done via [employer] and patient was informed that this is a secure, HIPAA-compliant platform  She agrees to proceed  Patient agrees to participate in a virtual check in via telephone or video visit instead of presenting to the office to address urgent/immediate medical needs  Patient is aware this is a billable service      After connecting through [facility], the patient was identified by name and date of birth  Debbie Ramires was informed that this was a telemedicine visit and that the exam was being conducted confidentially over secure lines  My office door was closed  No one else was in the room  Debbie Ramires acknowledged consent and understanding of privacy and security of the telemedicine visit  I informed the patient that I have reviewed her record in Epic and presented the opportunity for her to ask any questions regarding the visit today  The patient agreed to participate  Subjective:   Debbie Ramires is a 64 y o  female who has been screened for COVID-19  Symptom change since last report: worsening  Patient's symptoms include fever, chills, fatigue, malaise, rhinorrhea, diarrhea, myalgias and headache  Patient denies congestion, sore throat, anosmia, loss of taste, cough, shortness of breath, chest tightness, nausea and vomiting  Ayleen Luna has been staying home and has isolated themselves in her home  She is taking care to not share personal items and is cleaning all surfaces that are touched often, like counters, tabletops, and doorknobs using household cleaning sprays or wipes  She is wearing a mask when she leaves her room  Date of symptom onset: 1/10/2021  Date of exposure: 1/6/2021  Date of positive COVID-19 PCR: 1/12/2021    Ayleen Luna continues to feel worse  She has intermittent lightheadedness, low grade fever, and body aches  She has a headache and diarrhea that recently started  Her pulse ox is 95% currently  She denies cough or shortness of breath           Lab Results   Component Value Date    SARSCOV2 Detected (A) 01/12/2021     Past Medical History:   Diagnosis Date    GERD (gastroesophageal reflux disease)     Hyperlipidemia     Hypertension     Hypokalemia 03/13/2015    Obesity     PONV (postoperative nausea and vomiting)      Past Surgical History:   Procedure Laterality Date    BREAST BIOPSY Left 11/15/2011    Ultrasound guided biopsy    BREAST BIOPSY Left 2011    Ultrasound guided biopsy    BREAST SURGERY      reduction     SECTION      HEMORROIDECTOMY      [place] EGD TRANSORAL BIOPSY SINGLE/MULTIPLE N/A 2016    Procedure: ESOPHAGOGASTRODUODENOSCOPY (EGD); Surgeon: Priscilla Jimenez MD;  Location:  GI LAB; Service: General    REDUCTION MAMMAPLASTY Bilateral 2015    TUBAL LIGATION       Current Outpatient Medications   Medication Sig Dispense Refill    atorvastatin (LIPITOR) 20 mg tablet TAKE 1 TABLET BY MOUTH EVERY DAY 90 tablet 0    lisinopril (ZESTRIL) 10 mg tablet TAKE 1 TABLET BY MOUTH EVERY DAY 90 tablet 0    PARoxetine (PAXIL) 10 mg tablet TAKE 1 TABLET BY MOUTH EVERY DAY 90 tablet 1    RABEprazole (ACIPHEX) 20 MG tablet TAKE 1 TABLET BY MOUTH EVERY DAY 90 tablet 0     No current facility-administered medications for this visit  No Known Allergies    Review of Systems   Constitutional: Positive for chills, fatigue and fever  HENT: Positive for rhinorrhea  Negative for congestion and sore throat  Respiratory: Negative for cough, chest tightness and shortness of breath  Gastrointestinal: Positive for diarrhea  Negative for nausea and vomiting  Musculoskeletal: Positive for myalgias  Neurological: Positive for headaches  Objective: There were no vitals filed for this visit  Physical Exam  Constitutional:       Appearance: She is well-developed  HENT:      Head: Normocephalic  Eyes:      Pupils: Pupils are equal, round, and reactive to light  Pulmonary:      Effort: Pulmonary effort is normal    Neurological:      Mental Status: She is alert  Psychiatric:         Behavior: Behavior normal        VIRTUAL VISIT DISCLAIMER    Prisca Montoya acknowledges that she has consented to an online visit or consultation   She understands that the online visit is based solely on information provided by her, and that, in the absence of a face-to-face physical evaluation by the physician, the diagnosis she receives is both limited and provisional in terms of accuracy and completeness  This is not intended to replace a full medical face-to-face evaluation by the physician  Prisca Montoya understands and accepts these terms

## 2021-01-15 ENCOUNTER — TELEMEDICINE (OUTPATIENT)
Dept: INTERNAL MEDICINE CLINIC | Facility: CLINIC | Age: 61
End: 2021-01-15
Payer: COMMERCIAL

## 2021-01-15 DIAGNOSIS — U07.1 COVID-19 VIRUS INFECTION: Primary | ICD-10-CM

## 2021-01-15 PROCEDURE — 99212 OFFICE O/P EST SF 10 MIN: CPT | Performed by: NURSE PRACTITIONER

## 2021-01-15 NOTE — PROGRESS NOTES
COVID-19 Virtual Visit     Assessment/Plan:    Problem List Items Addressed This Visit        Other    COVID-19 virus infection - Primary     Continue symptomatic treatment  Stay well hydrated  Monitor pulse ox  Advised to go to the ER if <90%              Disposition:     I recommended continued isolation until at least 24 hours have passed since recovery defined as resolution of fever without the use of fever-reducing medications AND improvement in COVID symptoms AND 10 days have passed since onset of symptoms (or 10 days have passed since date of first positive viral diagnostic test for asymptomatic patients)  I have spent 7 minutes directly with the patient  Greater than 50% of this time was spent in counseling/coordination of care regarding: instructions for management and patient and family education  Encounter provider Adolfo Gowers, CRNP    Provider located at [address] [place] [phone]    Recent Visits  Date Type Provider Dept   01/14/21 Telemedicine Adolfo Gowers, [address] Internal Med   01/12/21 Telemedicine Paulfo Gowers, [address] [place] [address],[address] Internal Med   Showing recent visits within past 7 days and meeting all other requirements     Today's Visits  Date Type Provider Dept   01/15/21 [facility], [address] Internal Med   Showing today's visits and meeting all other requirements     Future Appointments  No visits were found meeting these conditions  Showing future appointments within next 150 days and meeting all other requirements      This virtual check-in was done via [facility] and patient was informed that this is a secure, HIPAA-compliant platform  She agrees to proceed  Patient agrees to participate in a virtual check in via telephone or video visit instead of presenting to the office to address urgent/immediate medical needs   Patient is aware this is a billable service  After connecting through [facility], the patient was identified by name and date of birth  Josephine Holbrook was informed that this was a telemedicine visit and that the exam was being conducted confidentially over secure lines  My office door was closed  No one else was in the room  Josephine Holbrook acknowledged consent and understanding of privacy and security of the telemedicine visit  I informed the patient that I have reviewed her record in Epic and presented the opportunity for her to ask any questions regarding the visit today  The patient agreed to participate  Subjective:   Josephine Holbrook is a 64 y o  female who has been screened for COVID-19  Symptom change since last report: improving  Patient's symptoms include fever, chills, fatigue, malaise, nasal congestion, chest tightness, diarrhea, myalgias and headache  Patient denies rhinorrhea, sore throat, anosmia, loss of taste, cough, shortness of breath, nausea and vomiting  Michele Mcneal has been staying home and has isolated themselves in her home  She is taking care to not share personal items and is cleaning all surfaces that are touched often, like counters, tabletops, and doorknobs using household cleaning sprays or wipes  She is wearing a mask when she leaves her room  Date of symptom onset: 1/10/2021  Date of exposure: 1/6/2021  Date of positive COVID-19 PCR: 1/12/2021    Today feels a little better  Pulse ox is in the high 90's  She has some chest tightness but no cough or shortness of breath  She continues to have a low grade fever, intermittent headache and body aches, diarrhea, and lightheadedness  She feels fatigued  She is drinking adequate fluids  Her  also tested positive         Lab Results   Component Value Date    SARSCOV2 Detected (A) 01/12/2021     Past Medical History:   Diagnosis Date    GERD (gastroesophageal reflux disease)     Hyperlipidemia     Hypertension     Hypokalemia 03/13/2015    Obesity     PONV (postoperative nausea and vomiting)      Past Surgical History:   Procedure Laterality Date    BREAST BIOPSY Left 11/15/2011    Ultrasound guided biopsy    BREAST BIOPSY Left 2011    Ultrasound guided biopsy    BREAST SURGERY      reduction     SECTION      HEMORROIDECTOMY      CT EGD TRANSORAL BIOPSY SINGLE/MULTIPLE N/A 2016    Procedure: ESOPHAGOGASTRODUODENOSCOPY (EGD); Surgeon: Lizz Matamoros MD;  [facility]; Service: General    REDUCTION MAMMAPLASTY Bilateral 2015    TUBAL LIGATION       Current Outpatient Medications   Medication Sig Dispense Refill    atorvastatin (LIPITOR) 20 mg tablet TAKE 1 TABLET BY MOUTH EVERY DAY 90 tablet 0    lisinopril (ZESTRIL) 10 mg tablet TAKE 1 TABLET BY MOUTH EVERY DAY 90 tablet 0    PARoxetine (PAXIL) 10 mg tablet TAKE 1 TABLET BY MOUTH EVERY DAY 90 tablet 1    RABEprazole (ACIPHEX) 20 MG tablet TAKE 1 TABLET BY MOUTH EVERY DAY 90 tablet 0     No current facility-administered medications for this visit  No Known Allergies    Review of Systems   Constitutional: Positive for chills, fatigue and fever  HENT: Positive for congestion  Negative for rhinorrhea and sore throat  Respiratory: Positive for chest tightness  Negative for cough and shortness of breath  Gastrointestinal: Positive for diarrhea  Negative for nausea and vomiting  Musculoskeletal: Positive for myalgias  Neurological: Positive for headaches  Objective: There were no vitals filed for this visit  Physical Exam  Constitutional:       Appearance: She is well-developed  HENT:      Head: Normocephalic  Eyes:      Pupils: Pupils are equal, round, and reactive to light  Pulmonary:      Effort: Pulmonary effort is normal    Neurological:      Mental Status: She is alert  Psychiatric:         Behavior: Behavior normal        VIRTUAL VISIT DISCLAIMER    Sánchez Barahona acknowledges that she has consented to an online visit or consultation  She understands that the online visit is based solely on information provided by her, and that, in the absence of a face-to-face physical evaluation by the physician, the diagnosis she receives is both limited and provisional in terms of accuracy and completeness  This is not intended to replace a full medical face-to-face evaluation by the physician  Josi Ruiz understands and accepts these terms

## 2021-01-15 NOTE — ASSESSMENT & PLAN NOTE
Continue symptomatic treatment  Stay well hydrated  Monitor pulse ox   Advised to go to the ER if <90%

## 2021-01-18 ENCOUNTER — HOSPITAL ENCOUNTER (EMERGENCY)
Facility: HOSPITAL | Age: 61
Discharge: HOME | End: 2021-01-18
Attending: EMERGENCY MEDICINE | Admitting: EMERGENCY MEDICINE
Payer: COMMERCIAL

## 2021-01-18 ENCOUNTER — APPOINTMENT (EMERGENCY)
Dept: RADIOLOGY | Facility: HOSPITAL | Age: 61
End: 2021-01-18
Payer: COMMERCIAL

## 2021-01-18 ENCOUNTER — TELEMEDICINE (OUTPATIENT)
Dept: INTERNAL MEDICINE CLINIC | Facility: CLINIC | Age: 61
End: 2021-01-18
Payer: COMMERCIAL

## 2021-01-18 VITALS
OXYGEN SATURATION: 95 % | SYSTOLIC BLOOD PRESSURE: 156 MMHG | HEART RATE: 76 BPM | DIASTOLIC BLOOD PRESSURE: 95 MMHG | RESPIRATION RATE: 20 BRPM | TEMPERATURE: 98.7 F

## 2021-01-18 DIAGNOSIS — U07.1 COVID-19 VIRUS INFECTION: Primary | ICD-10-CM

## 2021-01-18 DIAGNOSIS — U07.1 PNEUMONIA DUE TO COVID-19 VIRUS: ICD-10-CM

## 2021-01-18 DIAGNOSIS — E87.6 HYPOKALEMIA: ICD-10-CM

## 2021-01-18 DIAGNOSIS — R07.9 CHEST PAIN: ICD-10-CM

## 2021-01-18 DIAGNOSIS — J12.82 PNEUMONIA DUE TO COVID-19 VIRUS: ICD-10-CM

## 2021-01-18 LAB
ALBUMIN SERPL BCP-MCNC: 3 G/DL (ref 3.5–5)
ALP SERPL-CCNC: 93 U/L (ref 46–116)
ALT SERPL W P-5'-P-CCNC: 154 U/L (ref 12–78)
ANION GAP SERPL CALCULATED.3IONS-SCNC: 12 MMOL/L (ref 4–13)
AST SERPL W P-5'-P-CCNC: 148 U/L (ref 5–45)
ATRIAL RATE: 77 BPM
BASOPHILS # BLD AUTO: 0.01 THOUSANDS/ΜL (ref 0–0.1)
BASOPHILS NFR BLD AUTO: 0 % (ref 0–1)
BILIRUB SERPL-MCNC: 0.3 MG/DL (ref 0.2–1)
BUN SERPL-MCNC: 15 MG/DL (ref 5–25)
CALCIUM ALBUM COR SERPL-MCNC: 9.5 MG/DL (ref 8.3–10.1)
CALCIUM SERPL-MCNC: 8.7 MG/DL (ref 8.3–10.1)
CHLORIDE SERPL-SCNC: 106 MMOL/L (ref 100–108)
CO2 SERPL-SCNC: 21 MMOL/L (ref 21–32)
CREAT SERPL-MCNC: 0.83 MG/DL (ref 0.6–1.3)
EOSINOPHIL # BLD AUTO: 0 THOUSAND/ΜL (ref 0–0.61)
EOSINOPHIL NFR BLD AUTO: 0 % (ref 0–6)
ERYTHROCYTE [DISTWIDTH] IN BLOOD BY AUTOMATED COUNT: 13.1 % (ref 11.6–15.1)
GFR SERPL CREATININE-BSD FRML MDRD: 76 ML/MIN/1.73SQ M
GLUCOSE SERPL-MCNC: 149 MG/DL (ref 65–140)
HCT VFR BLD AUTO: 43.4 % (ref 34.8–46.1)
HGB BLD-MCNC: 14 G/DL (ref 11.5–15.4)
HOLD SPECIMEN: NORMAL
IMM GRANULOCYTES # BLD AUTO: 0.02 THOUSAND/UL (ref 0–0.2)
IMM GRANULOCYTES NFR BLD AUTO: 0 % (ref 0–2)
LYMPHOCYTES # BLD AUTO: 1.07 THOUSANDS/ΜL (ref 0.6–4.47)
LYMPHOCYTES NFR BLD AUTO: 21 % (ref 14–44)
MCH RBC QN AUTO: 28.7 PG (ref 26.8–34.3)
MCHC RBC AUTO-ENTMCNC: 32.3 G/DL (ref 31.4–37.4)
MCV RBC AUTO: 89 FL (ref 82–98)
MONOCYTES # BLD AUTO: 0.28 THOUSAND/ΜL (ref 0.17–1.22)
MONOCYTES NFR BLD AUTO: 6 % (ref 4–12)
NEUTROPHILS # BLD AUTO: 3.69 THOUSANDS/ΜL (ref 1.85–7.62)
NEUTS SEG NFR BLD AUTO: 73 % (ref 43–75)
NRBC BLD AUTO-RTO: 0 /100 WBCS
P AXIS: 72 DEGREES
PLATELET # BLD AUTO: 230 THOUSANDS/UL (ref 149–390)
PMV BLD AUTO: 9.5 FL (ref 8.9–12.7)
POTASSIUM SERPL-SCNC: 3.1 MMOL/L (ref 3.5–5.3)
PR INTERVAL: 154 MS
PROT SERPL-MCNC: 7 G/DL (ref 6.4–8.2)
QRS AXIS: 57 DEGREES
QRSD INTERVAL: 72 MS
QT INTERVAL: 372 MS
QTC INTERVAL: 413 MS
RBC # BLD AUTO: 4.88 MILLION/UL (ref 3.81–5.12)
SODIUM SERPL-SCNC: 139 MMOL/L (ref 136–145)
T WAVE AXIS: 36 DEGREES
TROPONIN I SERPL-MCNC: <0.02 NG/ML
VENTRICULAR RATE: 74 BPM
WBC # BLD AUTO: 5.07 THOUSAND/UL (ref 4.31–10.16)

## 2021-01-18 PROCEDURE — 85025 COMPLETE CBC W/AUTO DIFF WBC: CPT | Performed by: EMERGENCY MEDICINE

## 2021-01-18 PROCEDURE — 84484 ASSAY OF TROPONIN QUANT: CPT | Performed by: EMERGENCY MEDICINE

## 2021-01-18 PROCEDURE — 99214 OFFICE O/P EST MOD 30 MIN: CPT | Performed by: NURSE PRACTITIONER

## 2021-01-18 PROCEDURE — 36415 COLL VENOUS BLD VENIPUNCTURE: CPT

## 2021-01-18 PROCEDURE — 93005 ELECTROCARDIOGRAM TRACING: CPT

## 2021-01-18 PROCEDURE — 71045 X-RAY EXAM CHEST 1 VIEW: CPT

## 2021-01-18 PROCEDURE — 80053 COMPREHEN METABOLIC PANEL: CPT | Performed by: EMERGENCY MEDICINE

## 2021-01-18 PROCEDURE — 99285 EMERGENCY DEPT VISIT HI MDM: CPT | Performed by: EMERGENCY MEDICINE

## 2021-01-18 PROCEDURE — 99285 EMERGENCY DEPT VISIT HI MDM: CPT

## 2021-01-18 PROCEDURE — 93010 ELECTROCARDIOGRAM REPORT: CPT | Performed by: INTERNAL MEDICINE

## 2021-01-18 RX ORDER — IBUPROFEN 400 MG/1
400 TABLET ORAL ONCE
Status: COMPLETED | OUTPATIENT
Start: 2021-01-18 | End: 2021-01-18

## 2021-01-18 RX ORDER — DOXYCYCLINE HYCLATE 100 MG/1
100 CAPSULE ORAL 2 TIMES DAILY
Qty: 14 CAPSULE | Refills: 0 | Status: SHIPPED | OUTPATIENT
Start: 2021-01-18 | End: 2021-01-25

## 2021-01-18 RX ORDER — DOXYCYCLINE HYCLATE 100 MG/1
100 CAPSULE ORAL ONCE
Status: COMPLETED | OUTPATIENT
Start: 2021-01-18 | End: 2021-01-18

## 2021-01-18 RX ORDER — POTASSIUM CHLORIDE 20 MEQ/1
40 TABLET, EXTENDED RELEASE ORAL ONCE
Status: COMPLETED | OUTPATIENT
Start: 2021-01-18 | End: 2021-01-18

## 2021-01-18 RX ADMIN — IBUPROFEN 400 MG: 400 TABLET ORAL at 14:41

## 2021-01-18 RX ADMIN — POTASSIUM CHLORIDE 40 MEQ: 1500 TABLET, EXTENDED RELEASE ORAL at 14:35

## 2021-01-18 RX ADMIN — DOXYCYCLINE 100 MG: 100 CAPSULE ORAL at 15:56

## 2021-01-18 NOTE — ED PROVIDER NOTES
History  Chief Complaint   Patient presents with    Chest Pain     Pt presents to the ED with COVID 1/12  Reports couldnt sleep last night, reports CP, anxiety and SOB  History provided by:  Patient   used: No    69-year-old female testing positive for COVID about a week ago presented with worsening chest pain all day  States that she had some difficulty sleeping last night  Pain is fairly central, achy and constant  Moderate intensity overall  She has been taking Tylenol with some improvement  States that she has body aches, only slight cough and fairly persistent fever  States she has no appetite but is still drinking fluids  Has a history hypertension, hyperlipidemia but no known coronary disease  EKG and some labs done prior to my evaluation showing hypokalemia, transaminitis which is likely related to COVID infection  Plan chest x-ray and troponin and will give ibuprofen for aches, replete potassium  Prior to Admission Medications   Prescriptions Last Dose Informant Patient Reported? Taking?    PARoxetine (PAXIL) 10 mg tablet 1/18/2021 at Unknown time  No Yes   Sig: TAKE 1 TABLET BY MOUTH EVERY DAY   RABEprazole (ACIPHEX) 20 MG tablet 1/18/2021 at Unknown time  No Yes   Sig: TAKE 1 TABLET BY MOUTH EVERY DAY   atorvastatin (LIPITOR) 20 mg tablet 1/18/2021 at Unknown time  No Yes   Sig: TAKE 1 TABLET BY MOUTH EVERY DAY   lisinopril (ZESTRIL) 10 mg tablet Past Week at Unknown time  No Yes   Sig: TAKE 1 TABLET BY MOUTH EVERY DAY      Facility-Administered Medications: None       Past Medical History:   Diagnosis Date    GERD (gastroesophageal reflux disease)     Hyperlipidemia     Hypertension     Hypokalemia 03/13/2015    Obesity     PONV (postoperative nausea and vomiting)        Past Surgical History:   Procedure Laterality Date    BREAST BIOPSY Left 11/15/2011    Ultrasound guided biopsy    BREAST BIOPSY Left 11/28/2011    Ultrasound guided biopsy    BREAST SURGERY      reduction     SECTION      HEMORROIDECTOMY      LA EGD TRANSORAL BIOPSY SINGLE/MULTIPLE N/A 2016    Procedure: ESOPHAGOGASTRODUODENOSCOPY (EGD); Surgeon: Shanon Gross MD;  Location: [facility]; Service: General    REDUCTION MAMMAPLASTY Bilateral 2015    TUBAL LIGATION         Family History   Problem Relation Age of Onset    Heart disease Mother         Coronary Artery Bypass Graft    Hyperlipidemia Mother     Kidney failure Mother     Heart failure Mother     Hypertension Father     Hyperlipidemia Brother     No Known Problems Sister     No Known Problems Daughter     Liver cancer Maternal Grandmother     No Known Problems Maternal Grandfather     No Known Problems Paternal Grandmother     No Known Problems Paternal Grandfather     No Known Problems Daughter     Dementia Maternal Aunt     Alcohol abuse Neg Hx     Depression Neg Hx     Drug abuse Neg Hx     Substance Abuse Neg Hx     Mental illness Neg Hx      I have reviewed and agree with the history as documented  E-Cigarette/Vaping     E-Cigarette/Vaping Substances     Social History     Tobacco Use    Smoking status: Former Smoker     Types: Cigarettes    Smokeless tobacco: Never Used    Tobacco comment: Never a smoker  per [employer]   Substance Use Topics    Alcohol use: Not Currently     Frequency: 2-3 times a week     Drinks per session: 1 or 2     Comment: social - minimum alcohol  consumption per [employer]    Drug use: No       Review of Systems   Constitutional: Positive for appetite change and fever  Negative for activity change  Respiratory: Positive for cough  Negative for chest tightness and shortness of breath  Cardiovascular: Positive for chest pain  Gastrointestinal: Negative for abdominal pain, nausea and vomiting  Musculoskeletal: Negative for back pain and neck pain  Skin: Negative for color change and rash  Neurological: Negative for dizziness and weakness  Psychiatric/Behavioral: Positive for sleep disturbance  The patient is nervous/anxious  All other systems reviewed and are negative  Physical Exam  Physical Exam  Vitals signs and nursing note reviewed  Constitutional:       Appearance: She is well-developed  HENT:      Head: Normocephalic and atraumatic  Neck:      Musculoskeletal: Normal range of motion  Vascular: No JVD  Cardiovascular:      Rate and Rhythm: Normal rate and regular rhythm  Heart sounds: Normal heart sounds  No murmur  Pulmonary:      Effort: Pulmonary effort is normal       Breath sounds: Normal breath sounds  Abdominal:      Palpations: Abdomen is soft  Musculoskeletal: Normal range of motion  Right lower leg: No edema  Left lower leg: No edema  Skin:     General: Skin is warm and dry  Neurological:      General: No focal deficit present  Mental Status: She is alert and oriented to person, place, and time  Psychiatric:         Mood and Affect: Mood is anxious           Behavior: Behavior normal          Vital Signs  ED Triage Vitals [01/18/21 1237]   Temperature Pulse Respirations Blood Pressure SpO2   98 7 °F (37 1 °C) 86 22 153/65 96 %      Temp Source Heart Rate Source Patient Position - Orthostatic VS BP Location FiO2 (%)   Oral Monitor Sitting Right arm --      Pain Score       8           Vitals:    01/18/21 1237 01/18/21 1443   BP: 153/65 156/95   Pulse: 86 76   Patient Position - Orthostatic VS: Sitting Lying         Visual Acuity  Visual Acuity      Most Recent Value   L Pupil Size (mm)  3   R Pupil Size (mm)  3          ED Medications  Medications   doxycycline hyclate (VIBRAMYCIN) capsule 100 mg (has no administration in time range)   potassium chloride (K-DUR,KLOR-CON) CR tablet 40 mEq (40 mEq Oral Given 1/18/21 1435)   ibuprofen (MOTRIN) tablet 400 mg (400 mg Oral Given 1/18/21 1441)       Diagnostic Studies  Results Reviewed     Procedure Component Value Units Date/Time Troponin I [[id number]]  (Normal) Collected: 01/18/21 1445    Lab Status: Final result Specimen: Blood Updated: 01/18/21 1516     Troponin I <0 02 ng/mL     Comprehensive metabolic panel [[id number]]  (Abnormal) Collected: 01/18/21 1242    Lab Status: Final result Specimen: Blood from Arm, Right Updated: 01/18/21 1311     Sodium 139 mmol/L      Potassium 3 1 mmol/L      Chloride 106 mmol/L      CO2 21 mmol/L      ANION GAP 12 mmol/L      BUN 15 mg/dL      Creatinine 0 83 mg/dL      Glucose 149 mg/dL      Calcium 8 7 mg/dL      Corrected Calcium 9 5 mg/dL       U/L       U/L      Alkaline Phosphatase 93 U/L      Total Protein 7 0 g/dL      Albumin 3 0 g/dL      Total Bilirubin 0 30 mg/dL      eGFR 76 ml/min/1 73sq m     Narrative:      [place] guidelines for Chronic Kidney Disease (CKD):     Stage 1 with normal or high GFR (GFR > 90 mL/min/1 73 square meters)    Stage 2 Mild CKD (GFR = 60-89 mL/min/1 73 square meters)    Stage 3A Moderate CKD (GFR = 45-59 mL/min/1 73 square meters)    Stage 3B Moderate CKD (GFR = 30-44 mL/min/1 73 square meters)    Stage 4 Severe CKD (GFR = 15-29 mL/min/1 73 square meters)    Stage 5 End Stage CKD (GFR <15 mL/min/1 73 square meters)  Note: GFR calculation is accurate only with a steady state creatinine    CBC and differential [[id number]] Collected: 01/18/21 1242    Lab Status: Final result Specimen: Blood from Arm, Right Updated: 01/18/21 1250     WBC 5 07 Thousand/uL      RBC 4 88 Million/uL      Hemoglobin 14 0 g/dL      Hematocrit 43 4 %      MCV 89 fL      MCH 28 7 pg      MCHC 32 3 g/dL      RDW 13 1 %      MPV 9 5 fL      Platelets 879 Thousands/uL      nRBC 0 /100 WBCs      Neutrophils Relative 73 %      Immat GRANS % 0 %      Lymphocytes Relative 21 %      Monocytes Relative 6 %      Eosinophils Relative 0 %      Basophils Relative 0 %      Neutrophils Absolute 3 69 Thousands/µL      Immature Grans Absolute 0 02 Thousand/uL Lymphocytes Absolute 1 07 Thousands/µL      Monocytes Absolute 0 28 Thousand/µL      Eosinophils Absolute 0 00 Thousand/µL      Basophils Absolute 0 01 Thousands/µL                  XR chest 1 view portable   ED Interpretation by Mark Guevara MD (01/18 1450)   Small bilateral ground glass opacities      Final Result by Boris Macario MD (01/18 1523)   Suspected early bibasal infiltrates               Workstation performed: [id number]                    Procedures  ECG 12 Lead Documentation Only    Date/Time: 1/18/2021 2:21 PM  Performed by: Mark Guevara MD  Authorized by: Mark Guevara MD     Indications / Diagnosis:  Chest pain  ECG reviewed by me, the ED Provider: yes    Patient location:  ED  Previous ECG:     Previous ECG:  Compared to current    Comparison ECG info:  12/29/14    Similarity:  No change  Rate:     ECG rate:  74  Rhythm:     Rhythm: sinus rhythm    Ectopy:     Ectopy: none    QRS:     QRS axis:  Normal  Conduction:     Conduction: normal    ST segments:     ST segments:  Normal  T waves:     T waves: normal               ED Course  ED Course as of Jan 18 1534   Mon Jan 18, 2021   1418 Potassium(!): 3 1   1418 AST(!): 148   1418 ALT(!): 154             HEART Risk Score      Most Recent Value   Heart Score Risk Calculator   History  0 Filed at: 01/18/2021 1525   ECG  0 Filed at: 01/18/2021 1525   Age  1 Filed at: 01/18/2021 1525   Risk Factors  1 Filed at: 01/18/2021 1525   Troponin  0 Filed at: 01/18/2021 1525   HEART Score  2 Filed at: 01/18/2021 1525                      SBIRT 20yo+      Most Recent Value   SBIRT (25 yo +)   In order to provide better care to our patients, we are screening all of our patients for alcohol and drug use  Would it be okay to ask you these screening questions? Yes Filed at: 01/18/2021 1449   Initial Alcohol Screen: US AUDIT-C    1  How often do you have a drink containing alcohol? 4 Filed at: 01/18/2021 1445   2   How many drinks containing alcohol do you have on a typical day you are drinking? 1 Filed at: 01/18/2021 1445   3a  Male UNDER 65: How often do you have five or more drinks on one occasion? 0 Filed at: 01/18/2021 1445   3b  FEMALE Any Age, or MALE 65+: How often do you have 4 or more drinks on one occassion? 0 Filed at: 01/18/2021 1445   Audit-C Score  5 Filed at: 01/18/2021 1445   SKYLAR: How many times in the past year have you    Used an illegal drug or used a prescription medication for non-medical reasons? Never Filed at: 01/18/2021 1445                    MDM  Number of Diagnoses or Management Options  Chest pain: new and requires workup  COVID-19 virus infection: new and requires workup  Hypokalemia: new and requires workup  Pneumonia due to COVID-19 virus: new and requires workup  Diagnosis management comments: 59-year-old female testing positive for COVID about a week ago reporting chest pain all day  She has also has a slight cough  No significant dyspnea  Still running fever and generalized muscle aches  Lab work was remarkable for hypokalemia and transaminitis  Potassium repleted here  Transaminitis likely related to COVID infection  She has no abdominal pain, nausea, vomiting  Appetite overall is decreased but she is able to drink fluids  Her EKG and troponin were negative  Chest x-ray shows early bibasilar infiltrates  These are most likely related to viral illness but will treat with doxycycline in case of concurrent bacterial pneumonia         Amount and/or Complexity of Data Reviewed  Clinical lab tests: ordered and reviewed  Tests in the radiology section of CPT®: ordered and reviewed  Independent visualization of images, tracings, or specimens: yes    Patient Progress  Patient progress: stable      Disposition  Final diagnoses:   COVID-19 virus infection   Chest pain   Hypokalemia   Pneumonia due to COVID-19 virus     Time reflects when diagnosis was documented in both MDM as applicable and the Disposition within this note     Time User Action Codes Description Comment    1/18/2021  2:22 PM Teetee Roca Add [U07 1] COVID-19 virus infection     1/18/2021  2:22 PM Alvino LEROY Add [R07 9] Chest pain     1/18/2021  2:22 PM Matilda Stuart Add [E87 6] Hypokalemia     1/18/2021  3:31 PM Kalani Roca Add [U07 1,  J12 82] Pneumonia due to COVID-19 virus       ED Disposition     ED Disposition Condition Date/Time Comment    Discharge Stable Mon Jan 18, 2021  3:26 PM Debbie Ramires discharge to home/self care  Follow-up Information     Follow up With Specialties Details Why Contact Info Additional Arpan Mata MD Internal Medicine   [address] [place] [address],[address]  [facility] 105  [phone]       [address] Emergency Department Emergency Medicine  If symptoms worsen [address] [facility] [id number] [place] [address] Emergency Department, [address] [place] [address], [facility]          Patient's Medications   Discharge Prescriptions    DOXYCYCLINE HYCLATE (VIBRAMYCIN) 100 MG CAPSULE    Take 1 capsule (100 mg total) by mouth 2 (two) times a day for 7 days       Start Date: 1/18/2021 End Date: 1/25/2021       Order Dose: 100 mg       Quantity: 14 capsule    Refills: 0     No discharge procedures on file      PDMP Review     None          ED Provider  Electronically Signed by           Rosie Hager MD  01/18/21 7621

## 2021-01-18 NOTE — ASSESSMENT & PLAN NOTE
Refer to the ER for worsening shortness of breath and pulse ox in the 80's   will transport patient now

## 2021-01-18 NOTE — PROGRESS NOTES
COVID-19 Virtual Visit     Assessment/Plan:    Problem List Items Addressed This Visit        Other    COVID-19 virus infection - Primary     Refer to the ER for worsening shortness of breath and pulse ox in the 80's   will transport patient now  Disposition:     I referred patient to the Emergency Department at: [facility] ED  I have spent 5 minutes directly with the patient  Encounter provider JENNIFER Paul    Provider located at [address] [id number]    Recent Visits  Date Type Provider Dept   01/15/21 Telemedicine Noel Fernández, [address] Internal Med   01/14/21 Telemedicine Noel Fernández, [address] Internal Med   01/12/21 Telemedicine Noel Fernández, [address] Internal Med   Showing recent visits within past 7 days and meeting all other requirements     Today's Visits  Date Type Provider Dept   01/18/21 [facility], [address] Internal Med   Showing today's visits and meeting all other requirements     Future Appointments  No visits were found meeting these conditions  Showing future appointments within next 150 days and meeting all other requirements      This virtual check-in was done via [employer] and patient was informed that this is a secure, HIPAA-compliant platform  She agrees to proceed  Patient agrees to participate in a virtual check in via telephone or video visit instead of presenting to the office to address urgent/immediate medical needs  Patient is aware this is a billable service  After connecting through [facility], the patient was identified by name and date of birth  Ole Higgins was informed that this was a telemedicine visit and that the exam was being conducted confidentially over secure lines  My office door was closed  No one else was in the room   Ole Higgins acknowledged consent and understanding of privacy and security of the telemedicine visit  I informed the patient that I have reviewed her record in Epic and presented the opportunity for her to ask any questions regarding the visit today  The patient agreed to participate  Subjective:   Parish Barlow is a 64 y o  female who has been screened for COVID-19  Symptom change since last report: worsening  Patient's symptoms include fever, chills, fatigue, cough, shortness of breath, chest tightness, nausea, vomiting, myalgias and headache  Zoila Pacheco has been staying home and has isolated themselves in her home  She is taking care to not share personal items and is cleaning all surfaces that are touched often, like counters, tabletops, and doorknobs using household cleaning sprays or wipes  She is wearing a mask when she leaves her room  Date of symptom onset: 1/10/2021  Date of exposure: 2021  Date of positive COVID-19 PCR: 2021    Zoila Pacheco feels worse  She continues to have a fever around 101  She feels short of breath with exertion  Her pulse ox has been in the 80's over the last day  She has had intermittent headaches and body aches  Yesterday she was vomiting all day  Lab Results   Component Value Date    SARSCOV2 Detected (A) 2021     Past Medical History:   Diagnosis Date    GERD (gastroesophageal reflux disease)     Hyperlipidemia     Hypertension     Hypokalemia 2015    Obesity     PONV (postoperative nausea and vomiting)      Past Surgical History:   Procedure Laterality Date    BREAST BIOPSY Left 11/15/2011    Ultrasound guided biopsy    BREAST BIOPSY Left 2011    Ultrasound guided biopsy    BREAST SURGERY      reduction     SECTION      HEMORROIDECTOMY      LA EGD TRANSORAL BIOPSY SINGLE/MULTIPLE N/A 2016    Procedure: ESOPHAGOGASTRODUODENOSCOPY (EGD); Surgeon: Yennifer Anthony MD;  Location: [facility];   Service: General    REDUCTION MAMMAPLASTY Bilateral 01/05/2015    TUBAL LIGATION       Current Outpatient Medications   Medication Sig Dispense Refill    atorvastatin (LIPITOR) 20 mg tablet TAKE 1 TABLET BY MOUTH EVERY DAY 90 tablet 0    lisinopril (ZESTRIL) 10 mg tablet TAKE 1 TABLET BY MOUTH EVERY DAY 90 tablet 0    PARoxetine (PAXIL) 10 mg tablet TAKE 1 TABLET BY MOUTH EVERY DAY 90 tablet 1    RABEprazole (ACIPHEX) 20 MG tablet TAKE 1 TABLET BY MOUTH EVERY DAY 90 tablet 0     No current facility-administered medications for this visit  No Known Allergies    Review of Systems   Constitutional: Positive for chills, fatigue and fever  Respiratory: Positive for cough, chest tightness and shortness of breath  Gastrointestinal: Positive for nausea and vomiting  Musculoskeletal: Positive for myalgias  Neurological: Positive for headaches  Objective: There were no vitals filed for this visit  Physical Exam  Constitutional:       Appearance: She is well-developed  HENT:      Head: Normocephalic  Eyes:      Pupils: Pupils are equal, round, and reactive to light  Pulmonary:      Effort: Tachypnea present  Neurological:      Mental Status: She is alert  Psychiatric:         Behavior: Behavior normal        VIRTUAL VISIT DISCLAIMER    Tylernia Dejohnny acknowledges that she has consented to an online visit or consultation  She understands that the online visit is based solely on information provided by her, and that, in the absence of a face-to-face physical evaluation by the physician, the diagnosis she receives is both limited and provisional in terms of accuracy and completeness  This is not intended to replace a full medical face-to-face evaluation by the physician  Junior Coppola understands and accepts these terms

## 2021-01-19 ENCOUNTER — TELEPHONE (OUTPATIENT)
Dept: INTERNAL MEDICINE CLINIC | Facility: CLINIC | Age: 61
End: 2021-01-19

## 2021-01-20 ENCOUNTER — TELEMEDICINE (OUTPATIENT)
Dept: INTERNAL MEDICINE CLINIC | Facility: CLINIC | Age: 61
End: 2021-01-20
Payer: COMMERCIAL

## 2021-01-20 DIAGNOSIS — U07.1 COVID-19 VIRUS INFECTION: Primary | ICD-10-CM

## 2021-01-20 DIAGNOSIS — I10 ESSENTIAL HYPERTENSION: ICD-10-CM

## 2021-01-20 DIAGNOSIS — R23.2 HOT FLASHES: ICD-10-CM

## 2021-01-20 PROCEDURE — 99213 OFFICE O/P EST LOW 20 MIN: CPT | Performed by: NURSE PRACTITIONER

## 2021-01-20 RX ORDER — PAROXETINE 10 MG/1
10 TABLET, FILM COATED ORAL DAILY
Qty: 90 TABLET | Refills: 1 | Status: SHIPPED | OUTPATIENT
Start: 2021-01-20 | End: 2021-10-26

## 2021-01-20 RX ORDER — LISINOPRIL 10 MG/1
10 TABLET ORAL DAILY
Qty: 90 TABLET | Refills: 1 | Status: SHIPPED | OUTPATIENT
Start: 2021-01-20 | End: 2021-10-26

## 2021-01-20 NOTE — ASSESSMENT & PLAN NOTE
Pulse ox has improved  Has remained >90%  Continue to monitor  Take antibiotics as prescribed  Stay well hydrated  Continue quarantine

## 2021-01-20 NOTE — PROGRESS NOTES
COVID-19 Virtual Visit     Assessment/Plan:    Problem List Items Addressed This Visit        Cardiovascular and Mediastinum    Essential hypertension    Relevant Medications    lisinopril (ZESTRIL) 10 mg tablet    Hot flashes    Relevant Medications    PARoxetine (PAXIL) 10 mg tablet       Other    COVID-19 virus infection - Primary     Pulse ox has improved  Has remained >90%  Continue to monitor  Take antibiotics as prescribed  Stay well hydrated  Continue quarantine  Disposition:     I recommended continued isolation until at least 24 hours have passed since recovery defined as resolution of fever without the use of fever-reducing medications AND improvement in COVID symptoms AND 10 days have passed since onset of symptoms (or 10 days have passed since date of first positive viral diagnostic test for asymptomatic patients)  I have spent 10 minutes directly with the patient  Greater than 50% of this time was spent in counseling/coordination of care regarding: instructions for management and patient and family education  Encounter provider JENNIFER Dennis    Provider located at [address] [place] [id number]    Recent Visits  Date Type Provider Dept   01/19/21 Telephone Reginald Antunez MD [facility] Internal Med   01/18/21 Telemedicine Levon Fan, [address] [place] [address],[place] [address] Internal Med   [facility], [address],[place] [address] Internal Med   01/14/21 [facility], [address] [place] [address],[place] [address] Internal [facility]   Showing recent visits within past 7 days and meeting all other requirements     Today's Visits  Date Type Provider Dept   01/20/21 [place] [facility], [address],[place] [address] Internal [facility]   Showing today's visits and meeting all other requirements     Future Appointments  No visits were found meeting these conditions     Showing future appointments within next 150 days and meeting all other requirements      This virtual check-in was done via [employer] and patient was informed that this is a secure, HIPAA-compliant platform  She agrees to proceed  Patient agrees to participate in a virtual check in via telephone or video visit instead of presenting to the office to address urgent/immediate medical needs  Patient is aware this is a billable service  After connecting through [facility], the patient was identified by name and date of birth  Marsha Melton was informed that this was a telemedicine visit and that the exam was being conducted confidentially over secure lines  My office door was closed  No one else was in the room  Marsha Melton acknowledged consent and understanding of privacy and security of the telemedicine visit  I informed the patient that I have reviewed her record in Epic and presented the opportunity for her to ask any questions regarding the visit today  The patient agreed to participate  Subjective:   Marsha Melton is a 64 y o  female who has been screened for COVID-19  Symptom change since last report: improving  Patient's symptoms include fever, chills, fatigue, shortness of breath, chest tightness and headache  Patient denies congestion, rhinorrhea, sore throat, anosmia, loss of taste, cough, nausea, vomiting, diarrhea and myalgias  Date of symptom onset: 1/10/2021  Date of exposure: 1/6/2021  Date of positive COVID-19 PCR: 1/12/2021    Kiesha Patel was seen in the ER on 1/18 due to worsening shortness of breath  Chest xray did show early infiltrates  She was prescribed doxycycline  Her pulse ox remained >90%  Her potassium was repleted  Her liver enzymes were elevated  Today she continues with symptoms  She feels shortness of breath and chest tightness with exertion  She feels lightheaded and weak  She feels somewhat improved from the last few days  She continues to have low grade fevers around 99 6           Lab Results   Component Value Date SARSCOV2 Detected (A) 2021     Past Medical History:   Diagnosis Date    GERD (gastroesophageal reflux disease)     Hyperlipidemia     Hypertension     Hypokalemia 2015    Obesity     PONV (postoperative nausea and vomiting)      Past Surgical History:   Procedure Laterality Date    BREAST BIOPSY Left 11/15/2011    Ultrasound guided biopsy    BREAST BIOPSY Left 2011    Ultrasound guided biopsy    BREAST SURGERY      reduction     SECTION      HEMORROIDECTOMY      NM EGD TRANSORAL BIOPSY SINGLE/MULTIPLE N/A 2016    Procedure: ESOPHAGOGASTRODUODENOSCOPY (EGD); Surgeon: Syl Carmichael MD;  [facility]; Service: General    REDUCTION MAMMAPLASTY Bilateral 2015    TUBAL LIGATION       Current Outpatient Medications   Medication Sig Dispense Refill    atorvastatin (LIPITOR) 20 mg tablet TAKE 1 TABLET BY MOUTH EVERY DAY 90 tablet 0    doxycycline hyclate (VIBRAMYCIN) 100 mg capsule Take 1 capsule (100 mg total) by mouth 2 (two) times a day for 7 days 14 capsule 0    lisinopril (ZESTRIL) 10 mg tablet Take 1 tablet (10 mg total) by mouth daily 90 tablet 1    PARoxetine (PAXIL) 10 mg tablet Take 1 tablet (10 mg total) by mouth daily 90 tablet 1    RABEprazole (ACIPHEX) 20 MG tablet TAKE 1 TABLET BY MOUTH EVERY DAY 90 tablet 0     No current facility-administered medications for this visit  No Known Allergies    Review of Systems   Constitutional: Positive for chills, fatigue and fever  HENT: Negative for congestion, rhinorrhea and sore throat  Respiratory: Positive for chest tightness and shortness of breath  Negative for cough  Gastrointestinal: Negative for diarrhea, nausea and vomiting  Musculoskeletal: Negative for myalgias  Neurological: Positive for headaches  Objective: There were no vitals filed for this visit  Physical Exam  Constitutional:       Appearance: She is well-developed  HENT:      Head: Normocephalic  Eyes:      Pupils: Pupils are equal, round, and reactive to light  Pulmonary:      Effort: Pulmonary effort is normal    Neurological:      Mental Status: She is alert  Psychiatric:         Behavior: Behavior normal        VIRTUAL VISIT DISCLAIMER    Carmen Lopez acknowledges that she has consented to an online visit or consultation  She understands that the online visit is based solely on information provided by her, and that, in the absence of a face-to-face physical evaluation by the physician, the diagnosis she receives is both limited and provisional in terms of accuracy and completeness  This is not intended to replace a full medical face-to-face evaluation by the physician  Carmen Lopez understands and accepts these terms

## 2021-01-22 ENCOUNTER — TELEMEDICINE (OUTPATIENT)
Dept: INTERNAL MEDICINE CLINIC | Facility: CLINIC | Age: 61
End: 2021-01-22
Payer: COMMERCIAL

## 2021-01-22 DIAGNOSIS — U07.1 COVID-19 VIRUS INFECTION: Primary | ICD-10-CM

## 2021-01-22 PROCEDURE — 99213 OFFICE O/P EST LOW 20 MIN: CPT | Performed by: NURSE PRACTITIONER

## 2021-01-22 PROCEDURE — 1036F TOBACCO NON-USER: CPT | Performed by: NURSE PRACTITIONER

## 2021-01-22 NOTE — PROGRESS NOTES
COVID-19 Virtual Visit     Assessment/Plan:    Problem List Items Addressed This Visit        Other    COVID-19 virus infection - Primary     Symptoms resolved  Off quarantine tomorrow  Disposition:     I recommended continued isolation until at least 24 hours have passed since recovery defined as resolution of fever without the use of fever-reducing medications AND improvement in COVID symptoms AND 10 days have passed since onset of symptoms (or 10 days have passed since date of first positive viral diagnostic test for asymptomatic patients)  I have spent 5 minutes directly with the patient  Greater than 50% of this time was spent in counseling/coordination of care regarding: patient and family education  Encounter provider JENNIFER Malave    Provider located at [address] [phone]    Recent Visits  Date Type Provider Dept   01/20/21 [facility] 1000 [facility], [address],[facility] 620 Internal Med   01/19/21 Telephone Lycoming Hailey, 235 [facility] Internal Med   01/18/21 [facility], [address] Internal Med   01/15/21 [facility], [address] Internal Med   Showing recent visits within past 7 days and meeting all other requirements     Today's Visits  Date Type Provider Dept   01/22/21 [facility], [address] Internal Med   Showing today's visits and meeting all other requirements     Future Appointments  No visits were found meeting these conditions  Showing future appointments within next 150 days and meeting all other requirements      This virtual check-in was done via [employer] and patient was informed that this is a secure, HIPAA-compliant platform  She agrees to proceed      Patient agrees to participate in a virtual check in via telephone or video visit instead of presenting to the office to address urgent/immediate medical needs  Patient is aware this is a billable service  After connecting through [facility], the patient was identified by name and date of birth  Junior Coppola was informed that this was a telemedicine visit and that the exam was being conducted confidentially over secure lines  My office door was closed  No one else was in the room  Junior Coppola acknowledged consent and understanding of privacy and security of the telemedicine visit  I informed the patient that I have reviewed her record in Epic and presented the opportunity for her to ask any questions regarding the visit today  The patient agreed to participate  Subjective:   Junior Coppola is a 64 y o  female who has been screened for COVID-19  Symptom change since last report: resolving  Patient is currently asymptomatic  Patient denies fever, chills, fatigue, malaise, congestion, rhinorrhea, sore throat, anosmia, loss of taste, cough, shortness of breath, chest tightness, abdominal pain, nausea, vomiting, diarrhea, myalgias and headaches  Cyndee Ramsey has been staying home and has isolated themselves in her home  She is taking care to not share personal items and is cleaning all surfaces that are touched often, like counters, tabletops, and doorknobs using household cleaning sprays or wipes  She is wearing a mask when she leaves her room  Date of symptom onset: 1/10/2021  Date of exposure: 1/6/2021  Date of positive COVID-19 PCR: 1/12/2021    Cyndee Ramsey feels much better  She has no fever in 2 days   She walked the dog today without chest tightness or shortness of breath             Lab Results   Component Value Date    SARSCOV2 Detected (A) 01/12/2021     Past Medical History:   Diagnosis Date    GERD (gastroesophageal reflux disease)     Hyperlipidemia     Hypertension     Hypokalemia 03/13/2015    Obesity     PONV (postoperative nausea and vomiting)      Past Surgical History:   Procedure Laterality Date    BREAST BIOPSY Left 11/15/2011    Ultrasound guided biopsy    BREAST BIOPSY Left 2011    Ultrasound guided biopsy    BREAST SURGERY      reduction     SECTION      HEMORROIDECTOMY      ME EGD TRANSORAL BIOPSY SINGLE/MULTIPLE N/A 2016    Procedure: ESOPHAGOGASTRODUODENOSCOPY (EGD); Surgeon: Ina Crain MD;  [facility]; Service: General    REDUCTION MAMMAPLASTY Bilateral 2015    TUBAL LIGATION       Current Outpatient Medications   Medication Sig Dispense Refill    atorvastatin (LIPITOR) 20 mg tablet TAKE 1 TABLET BY MOUTH EVERY DAY 90 tablet 0    doxycycline hyclate (VIBRAMYCIN) 100 mg capsule Take 1 capsule (100 mg total) by mouth 2 (two) times a day for 7 days 14 capsule 0    lisinopril (ZESTRIL) 10 mg tablet Take 1 tablet (10 mg total) by mouth daily 90 tablet 1    PARoxetine (PAXIL) 10 mg tablet Take 1 tablet (10 mg total) by mouth daily 90 tablet 1    RABEprazole (ACIPHEX) 20 MG tablet TAKE 1 TABLET BY MOUTH EVERY DAY 90 tablet 0     No current facility-administered medications for this visit  No Known Allergies    Review of Systems   Constitutional: Negative for chills, fatigue and fever  HENT: Negative for congestion, rhinorrhea and sore throat  Respiratory: Negative for cough, chest tightness and shortness of breath  Gastrointestinal: Negative for abdominal pain, diarrhea, nausea and vomiting  Musculoskeletal: Negative for myalgias  Neurological: Negative for headaches  Objective: There were no vitals filed for this visit  Physical Exam  Constitutional:       Appearance: She is well-developed  HENT:      Head: Normocephalic  Eyes:      Pupils: Pupils are equal, round, and reactive to light  Pulmonary:      Effort: Pulmonary effort is normal    Neurological:      Mental Status: She is alert     Psychiatric:         Behavior: Behavior normal        VIRTUAL VISIT DISCLAIMER    Josi Ruiz acknowledges that she has consented to an online visit or consultation  She understands that the online visit is based solely on information provided by her, and that, in the absence of a face-to-face physical evaluation by the physician, the diagnosis she receives is both limited and provisional in terms of accuracy and completeness  This is not intended to replace a full medical face-to-face evaluation by the physician  Ole Higgins understands and accepts these terms

## 2021-02-28 DIAGNOSIS — E78.2 MIXED HYPERLIPIDEMIA: ICD-10-CM

## 2021-02-28 DIAGNOSIS — K21.9 GASTROESOPHAGEAL REFLUX DISEASE WITHOUT ESOPHAGITIS: ICD-10-CM

## 2021-03-01 RX ORDER — ATORVASTATIN CALCIUM 20 MG/1
TABLET, FILM COATED ORAL
Qty: 90 TABLET | Refills: 0 | Status: SHIPPED | OUTPATIENT
Start: 2021-03-01 | End: 2021-04-23

## 2021-03-01 RX ORDER — RABEPRAZOLE SODIUM 20 MG/1
TABLET, DELAYED RELEASE ORAL
Qty: 90 TABLET | Refills: 0 | Status: SHIPPED | OUTPATIENT
Start: 2021-03-01 | End: 2021-04-23

## 2021-03-30 DIAGNOSIS — Z23 ENCOUNTER FOR IMMUNIZATION: ICD-10-CM

## 2021-04-02 ENCOUNTER — IMMUNIZATIONS (OUTPATIENT)
Dept: FAMILY MEDICINE CLINIC | Facility: HOSPITAL | Age: 61
End: 2021-04-02

## 2021-04-02 DIAGNOSIS — Z23 ENCOUNTER FOR IMMUNIZATION: Primary | ICD-10-CM

## 2021-04-02 PROCEDURE — 0001A SARS-COV-2 / COVID-19 MRNA VACCINE (PFIZER-BIONTECH) 30 MCG: CPT

## 2021-04-02 PROCEDURE — 91300 SARS-COV-2 / COVID-19 MRNA VACCINE (PFIZER-BIONTECH) 30 MCG: CPT

## 2021-04-23 DIAGNOSIS — E78.2 MIXED HYPERLIPIDEMIA: ICD-10-CM

## 2021-04-23 DIAGNOSIS — K21.9 GASTROESOPHAGEAL REFLUX DISEASE WITHOUT ESOPHAGITIS: ICD-10-CM

## 2021-04-23 RX ORDER — ATORVASTATIN CALCIUM 20 MG/1
TABLET, FILM COATED ORAL
Qty: 90 TABLET | Refills: 0 | Status: SHIPPED | OUTPATIENT
Start: 2021-04-23 | End: 2021-06-15 | Stop reason: SDUPTHER

## 2021-04-23 RX ORDER — RABEPRAZOLE SODIUM 20 MG/1
TABLET, DELAYED RELEASE ORAL
Qty: 90 TABLET | Refills: 0 | Status: SHIPPED | OUTPATIENT
Start: 2021-04-23 | End: 2021-06-15 | Stop reason: SDUPTHER

## 2021-04-26 ENCOUNTER — IMMUNIZATIONS (OUTPATIENT)
Dept: FAMILY MEDICINE CLINIC | Facility: HOSPITAL | Age: 61
End: 2021-04-26

## 2021-04-26 DIAGNOSIS — Z23 ENCOUNTER FOR IMMUNIZATION: Primary | ICD-10-CM

## 2021-04-26 PROCEDURE — 91300 SARS-COV-2 / COVID-19 MRNA VACCINE (PFIZER-BIONTECH) 30 MCG: CPT

## 2021-04-26 PROCEDURE — 0002A SARS-COV-2 / COVID-19 MRNA VACCINE (PFIZER-BIONTECH) 30 MCG: CPT

## 2021-06-15 DIAGNOSIS — K21.9 GASTROESOPHAGEAL REFLUX DISEASE WITHOUT ESOPHAGITIS: ICD-10-CM

## 2021-06-15 DIAGNOSIS — E78.2 MIXED HYPERLIPIDEMIA: ICD-10-CM

## 2021-06-15 RX ORDER — RABEPRAZOLE SODIUM 20 MG/1
20 TABLET, DELAYED RELEASE ORAL DAILY
Qty: 90 TABLET | Refills: 0 | Status: SHIPPED | OUTPATIENT
Start: 2021-06-15 | End: 2021-09-08

## 2021-06-15 RX ORDER — ATORVASTATIN CALCIUM 20 MG/1
20 TABLET, FILM COATED ORAL DAILY
Qty: 90 TABLET | Refills: 0 | Status: SHIPPED | OUTPATIENT
Start: 2021-06-15 | End: 2021-09-08

## 2021-06-15 NOTE — TELEPHONE ENCOUNTER
Pt  started with diarrhea this past Fri  Had it Sat  Also  last ate on Sat -had some grilled chicken  Started w/ abd  pain Sun  morning  Didn't eat much at all since Sat  Today she had a banana and a piece of toast  Still with abd  pain, no diarrhea or vomiting

## 2021-06-15 NOTE — TELEPHONE ENCOUNTER
Start soft diet or bland diet  Let your bowels rest  Avoid dark beverages such as coffee or soda, clear liquids only

## 2021-06-29 ENCOUNTER — OFFICE VISIT (OUTPATIENT)
Dept: INTERNAL MEDICINE CLINIC | Facility: CLINIC | Age: 61
End: 2021-06-29
Payer: COMMERCIAL

## 2021-06-29 VITALS
HEIGHT: 62 IN | OXYGEN SATURATION: 97 % | BODY MASS INDEX: 29.81 KG/M2 | SYSTOLIC BLOOD PRESSURE: 130 MMHG | TEMPERATURE: 96.6 F | WEIGHT: 162 LBS | DIASTOLIC BLOOD PRESSURE: 84 MMHG | HEART RATE: 101 BPM

## 2021-06-29 DIAGNOSIS — E66.09 CLASS 1 OBESITY DUE TO EXCESS CALORIES WITHOUT SERIOUS COMORBIDITY WITH BODY MASS INDEX (BMI) OF 30.0 TO 30.9 IN ADULT: ICD-10-CM

## 2021-06-29 DIAGNOSIS — Z79.899 ENCOUNTER FOR LONG-TERM CURRENT USE OF MEDICATION: ICD-10-CM

## 2021-06-29 DIAGNOSIS — E78.49 OTHER HYPERLIPIDEMIA: ICD-10-CM

## 2021-06-29 DIAGNOSIS — Z00.00 LABORATORY EXAMINATION ORDERED AS PART OF A ROUTINE GENERAL MEDICAL EXAMINATION: ICD-10-CM

## 2021-06-29 DIAGNOSIS — E55.9 VITAMIN D DEFICIENCY: ICD-10-CM

## 2021-06-29 DIAGNOSIS — R73.03 PREDIABETES: ICD-10-CM

## 2021-06-29 DIAGNOSIS — I83.812 VARICOSE VEINS OF LEFT LOWER EXTREMITY WITH PAIN: ICD-10-CM

## 2021-06-29 DIAGNOSIS — K21.9 GERD WITHOUT ESOPHAGITIS: ICD-10-CM

## 2021-06-29 DIAGNOSIS — Z00.00 HEALTH MAINTENANCE EXAMINATION: Primary | ICD-10-CM

## 2021-06-29 DIAGNOSIS — I10 ESSENTIAL HYPERTENSION: ICD-10-CM

## 2021-06-29 PROCEDURE — 3725F SCREEN DEPRESSION PERFORMED: CPT | Performed by: INTERNAL MEDICINE

## 2021-06-29 PROCEDURE — 3008F BODY MASS INDEX DOCD: CPT | Performed by: INTERNAL MEDICINE

## 2021-06-29 PROCEDURE — 99396 PREV VISIT EST AGE 40-64: CPT | Performed by: INTERNAL MEDICINE

## 2021-06-29 PROCEDURE — 3075F SYST BP GE 130 - 139MM HG: CPT | Performed by: INTERNAL MEDICINE

## 2021-06-29 PROCEDURE — 3079F DIAST BP 80-89 MM HG: CPT | Performed by: INTERNAL MEDICINE

## 2021-06-29 PROCEDURE — 1036F TOBACCO NON-USER: CPT | Performed by: INTERNAL MEDICINE

## 2021-06-29 RX ORDER — PHENTERMINE HYDROCHLORIDE 37.5 MG/1
18.75 TABLET ORAL 2 TIMES DAILY
Qty: 30 TABLET | Refills: 1 | Status: SHIPPED | OUTPATIENT
Start: 2021-06-29 | End: 2022-01-04 | Stop reason: SDUPTHER

## 2021-06-29 NOTE — ASSESSMENT & PLAN NOTE
Gained over 10 lbs since over a year ago  She is requesting for phentermine  Recommend to lose 10 lbs and maintain BMI  PDMP reviewed

## 2021-06-29 NOTE — PATIENT INSTRUCTIONS
Please do blood work  Call gynecology for an appointment  Avoid NSAIDs such as ibuprofen, naproxen, Advil, Aleve or Motrin  Take Tylenol for pain  Diet for Stomach Ulcers and Gastritis   WHAT YOU NEED TO KNOW:   A diet for stomach ulcers and gastritis is a meal plan that limits foods that irritate your stomach  Certain foods may worsen symptoms such as stomach pain, bloating, heartburn, or indigestion  Foods to limit or avoid:  You may need to avoid acidic, spicy, or high-fat foods  Not all foods affect everyone the same way  You will need to learn which foods worsen your symptoms and limit those foods  The following are some foods that may worsen ulcer or gastritis symptoms:  · Beverages:      ? Whole milk and chocolate milk    ? Hot cocoa and cola    ? Any beverage with caffeine    ? Regular and decaffeinated coffee    ? Peppermint and spearmint tea    ? Green and black tea, with or without caffeine    ? Orange and grapefruit juices    ? Drinks that contain alcohol    · Spices and seasonings:      ? Black and red pepper    ? Chili powder    ? Mustard seed and nutmeg    · Other foods:      ? Dairy foods made from whole milk or cream    ? Chocolate    ? Spicy or strongly flavored cheeses, such as jalapeno or black pepper    ? Highly seasoned, high-fat meats, such as sausage, salami, lopes, ham, and cold cuts    ? Hot chiles and peppers    ? Tomato products, such as tomato paste, tomato sauce, or tomato juice    Foods to include:  Eat a variety of healthy foods from all the food groups  Eat fruits, vegetables, whole grains, and fat-free or low-fat dairy foods  Whole grains include whole-wheat breads, cereals, pasta, and brown rice  Choose lean meats, poultry (chicken and turkey), fish, beans, eggs, and nuts  A healthy meal plan is low in unhealthy fats, salt, and added sugar  Healthy fats include olive oil and canola oil  Ask your dietitian for more information about a healthy diet       Other helpful guidelines:   · Do not eat right before bedtime  Stop eating at least 2 hours before bedtime  · Eat small, frequent meals  Your stomach may tolerate small, frequent meals better than large meals  © Copyright [address] Information is for End User's use only and may not be sold, redistributed or otherwise used for commercial purposes  All illustrations and images included in CareNotes® are the copyrighted property of A D A M , Inc  or [facility]   The above information is an  only  It is not intended as medical advice for individual conditions or treatments  Talk to your doctor, nurse or pharmacist before following any medical regimen to see if it is safe and effective for you

## 2021-06-29 NOTE — PROGRESS NOTES
Assessment/Plan:    Gastritis  Discussed low acid diet  Still with symptoms despite daily PPI  Refer to GI to rule out PUD  Essential hypertension  BP stable, on lisinopril  Other hyperlipidemia  Lipids due, on statin  Prediabetes  Repeat A1c  Overweight  Gained over 10 lbs since over a year ago  She is requesting for phentermine  Recommend to lose 10 lbs and maintain BMI  PDMP reviewed  Varicose veins of left lower extremity with pain  Symptoms worse recently, unable to use compression stockings during the summer  Refer to vascular, check ultrasound  Vitamin D deficiency  Takes D3 daily  Diagnoses and all orders for this visit:    Health maintenance examination  Comments:  Received COVID vaccine  Mammogram scheduled, PAPs due  Varicose veins of left lower extremity with pain  -     Ambulatory referral to Vascular Surgery; Future  -     VAS reflux lower limb venous duplex study with reflux assessment, complete bilateral; Future    Other hyperlipidemia  -     Lipid panel  -     TSH, 3rd generation with Free T4 reflex    Essential hypertension  -     Comprehensive metabolic panel    GERD without esophagitis  -     Ambulatory referral to Gastroenterology; Future    Prediabetes  -     Hemoglobin A1C    Vitamin D deficiency  -     Vitamin B12    Encounter for long-term current use of medication  -     Vitamin D 25 hydroxy    Laboratory examination ordered as part of a routine general medical examination  -     CBC and differential  -     Comprehensive metabolic panel  -     Hemoglobin A1C  -     Lipid panel  -     TSH, 3rd generation with Free T4 reflex  -     Vitamin D 25 hydroxy  -     Vitamin B12    Class 1 obesity due to excess calories without serious comorbidity with body mass index (BMI) of 30 0 to 30 9 in adult  -     phentermine (ADIPEX-P) 37 5 MG tablet; Take 0 5 tablets (18 75 mg total) by mouth 2 (two) times a day      Follow up in 6 months or as needed        Subjective: Patient ID: Romero Winchester is a 64 y o  female here for a physical     She continues to experience frequent epigastric pain, may occur before or after meals  She has occasional nausea, no vomiting  She moves her bowels regularly  She has been taking her reflux pill daily  She stopped drinking coffee and other dark beverages  She denies any pain radiating to her back, no dark stools  She complains of more frequent pain and swelling of her left leg  She stopped wearing her compression stockings recently due to the heat, was wearing it regularly before this  She reports intermittent right shoulder pain, no falls or injury  She thinks it may be due to carrying heavy bags  She takes Tylenol with good pain relief  She also complains of poison ivy, says she used "everything " She has been applying steroid cream at night only  She has small spots on her arms and legs  She would like to start mediation again to help her lose weight  The following portions of the patient's history were reviewed and updated as appropriate: allergies, current medications, past medical history, past social history and problem list     Review of Systems   Constitutional: Negative for activity change, appetite change and fatigue  HENT: Negative for congestion, ear pain and postnasal drip  Eyes: Negative for visual disturbance  Respiratory: Negative for cough and shortness of breath  Cardiovascular: Positive for leg swelling  Negative for chest pain  Gastrointestinal: Positive for abdominal pain and nausea  Negative for constipation, diarrhea and vomiting  Genitourinary: Negative for dysuria and frequency  Musculoskeletal: Negative for arthralgias and myalgias  Skin: Negative for rash and wound  Neurological: Negative for dizziness and headaches  Psychiatric/Behavioral: Negative for sleep disturbance  The patient is not nervous/anxious            Objective:      /84   Pulse 101   Temp (!) 96 6 °F (35 9 °C)  5' 2" (1 575 m)   Wt 73 5 kg (162 lb)   LMP 02/28/2019   SpO2 97%   BMI 29 63 kg/m²          Physical Exam  Vitals and nursing note reviewed  Constitutional:       General: She is not in acute distress  Appearance: She is well-developed  HENT:      Head: Normocephalic and atraumatic  Right Ear: Tympanic membrane, ear canal and external ear normal       Left Ear: Tympanic membrane, ear canal and external ear normal    Eyes:      Pupils: Pupils are equal, round, and reactive to light  Cardiovascular:      Rate and Rhythm: Normal rate and regular rhythm  Heart sounds: Normal heart sounds  Comments: (+) varicose veins L>R  Pulmonary:      Effort: Pulmonary effort is normal       Breath sounds: Normal breath sounds  No wheezing  Abdominal:      General: Bowel sounds are normal       Palpations: Abdomen is soft  Musculoskeletal:         General: No swelling  Skin:     General: Skin is warm  Findings: No rash  Neurological:      General: No focal deficit present  Mental Status: She is alert and oriented to person, place, and time  Psychiatric:         Mood and Affect: Mood normal          Behavior: Behavior normal            Labs & imaging results reviewed with patient  BMI Counseling: Body mass index is 29 63 kg/m²  The BMI is above normal  Nutrition recommendations include 3-5 servings of fruits/vegetables daily, decreasing soda and/or juice intake and moderation in carbohydrate intake  Exercise recommendations include moderate aerobic physical activity for 150 minutes/week  Pharmacotherapy was ordered for patient to aid in weight loss

## 2021-06-29 NOTE — ASSESSMENT & PLAN NOTE
Symptoms worse recently, unable to use compression stockings during the summer  Refer to vascular, check ultrasound

## 2021-07-21 ENCOUNTER — HOSPITAL ENCOUNTER (OUTPATIENT)
Dept: RADIOLOGY | Age: 61
Discharge: HOME | End: 2021-07-21
Payer: COMMERCIAL

## 2021-07-21 VITALS — HEIGHT: 62 IN | BODY MASS INDEX: 28.52 KG/M2 | WEIGHT: 155 LBS

## 2021-07-21 DIAGNOSIS — Z12.31 ENCOUNTER FOR SCREENING MAMMOGRAM FOR MALIGNANT NEOPLASM OF BREAST: ICD-10-CM

## 2021-07-21 PROCEDURE — 77067 SCR MAMMO BI INCL CAD: CPT

## 2021-07-21 PROCEDURE — 77063 BREAST TOMOSYNTHESIS BI: CPT

## 2021-07-22 ENCOUNTER — APPOINTMENT (OUTPATIENT)
Dept: LAB | Facility: CLINIC | Age: 61
End: 2021-07-22
Payer: COMMERCIAL

## 2021-07-22 LAB
25(OH)D3 SERPL-MCNC: 42.2 NG/ML (ref 30–100)
ALBUMIN SERPL BCP-MCNC: 3.8 G/DL (ref 3.5–5)
ALP SERPL-CCNC: 89 U/L (ref 46–116)
ALT SERPL W P-5'-P-CCNC: 28 U/L (ref 12–78)
ANION GAP SERPL CALCULATED.3IONS-SCNC: 10 MMOL/L (ref 4–13)
AST SERPL W P-5'-P-CCNC: 21 U/L (ref 5–45)
BASOPHILS # BLD AUTO: 0.08 THOUSANDS/ΜL (ref 0–0.1)
BASOPHILS NFR BLD AUTO: 1 % (ref 0–1)
BILIRUB SERPL-MCNC: 0.46 MG/DL (ref 0.2–1)
BUN SERPL-MCNC: 18 MG/DL (ref 5–25)
CALCIUM SERPL-MCNC: 9.3 MG/DL (ref 8.3–10.1)
CHLORIDE SERPL-SCNC: 105 MMOL/L (ref 100–108)
CHOLEST SERPL-MCNC: 194 MG/DL (ref 50–200)
CO2 SERPL-SCNC: 26 MMOL/L (ref 21–32)
CREAT SERPL-MCNC: 0.89 MG/DL (ref 0.6–1.3)
EOSINOPHIL # BLD AUTO: 0.11 THOUSAND/ΜL (ref 0–0.61)
EOSINOPHIL NFR BLD AUTO: 1 % (ref 0–6)
ERYTHROCYTE [DISTWIDTH] IN BLOOD BY AUTOMATED COUNT: 13.7 % (ref 11.6–15.1)
EST. AVERAGE GLUCOSE BLD GHB EST-MCNC: 123 MG/DL
GFR SERPL CREATININE-BSD FRML MDRD: 70 ML/MIN/1.73SQ M
GLUCOSE P FAST SERPL-MCNC: 105 MG/DL (ref 65–99)
HBA1C MFR BLD: 5.9 %
HCT VFR BLD AUTO: 43.1 % (ref 34.8–46.1)
HDLC SERPL-MCNC: 65 MG/DL
HGB BLD-MCNC: 13.8 G/DL (ref 11.5–15.4)
IMM GRANULOCYTES # BLD AUTO: 0.04 THOUSAND/UL (ref 0–0.2)
IMM GRANULOCYTES NFR BLD AUTO: 1 % (ref 0–2)
LDLC SERPL CALC-MCNC: 109 MG/DL (ref 0–100)
LYMPHOCYTES # BLD AUTO: 1.43 THOUSANDS/ΜL (ref 0.6–4.47)
LYMPHOCYTES NFR BLD AUTO: 19 % (ref 14–44)
MCH RBC QN AUTO: 29.6 PG (ref 26.8–34.3)
MCHC RBC AUTO-ENTMCNC: 32 G/DL (ref 31.4–37.4)
MCV RBC AUTO: 93 FL (ref 82–98)
MONOCYTES # BLD AUTO: 0.45 THOUSAND/ΜL (ref 0.17–1.22)
MONOCYTES NFR BLD AUTO: 6 % (ref 4–12)
NEUTROPHILS # BLD AUTO: 5.63 THOUSANDS/ΜL (ref 1.85–7.62)
NEUTS SEG NFR BLD AUTO: 72 % (ref 43–75)
NONHDLC SERPL-MCNC: 129 MG/DL
NRBC BLD AUTO-RTO: 0 /100 WBCS
PLATELET # BLD AUTO: 320 THOUSANDS/UL (ref 149–390)
PMV BLD AUTO: 9.1 FL (ref 8.9–12.7)
POTASSIUM SERPL-SCNC: 3.9 MMOL/L (ref 3.5–5.3)
PROT SERPL-MCNC: 7.6 G/DL (ref 6.4–8.2)
RBC # BLD AUTO: 4.66 MILLION/UL (ref 3.81–5.12)
SODIUM SERPL-SCNC: 141 MMOL/L (ref 136–145)
TRIGL SERPL-MCNC: 98 MG/DL
TSH SERPL DL<=0.05 MIU/L-ACNC: 1.05 UIU/ML (ref 0.36–3.74)
VIT B12 SERPL-MCNC: 683 PG/ML (ref 100–900)
WBC # BLD AUTO: 7.74 THOUSAND/UL (ref 4.31–10.16)

## 2021-07-22 PROCEDURE — 82607 VITAMIN B-12: CPT | Performed by: INTERNAL MEDICINE

## 2021-07-22 PROCEDURE — 85025 COMPLETE CBC W/AUTO DIFF WBC: CPT | Performed by: INTERNAL MEDICINE

## 2021-07-22 PROCEDURE — 84443 ASSAY THYROID STIM HORMONE: CPT | Performed by: INTERNAL MEDICINE

## 2021-07-22 PROCEDURE — 36415 COLL VENOUS BLD VENIPUNCTURE: CPT | Performed by: INTERNAL MEDICINE

## 2021-07-22 PROCEDURE — 83036 HEMOGLOBIN GLYCOSYLATED A1C: CPT | Performed by: INTERNAL MEDICINE

## 2021-07-22 PROCEDURE — 80061 LIPID PANEL: CPT | Performed by: INTERNAL MEDICINE

## 2021-07-22 PROCEDURE — 80053 COMPREHEN METABOLIC PANEL: CPT | Performed by: INTERNAL MEDICINE

## 2021-07-22 PROCEDURE — 82306 VITAMIN D 25 HYDROXY: CPT | Performed by: INTERNAL MEDICINE

## 2021-07-22 NOTE — RESULT ENCOUNTER NOTE
Lab results: sugars stable, still in prediabetic range  Your cholesterol has improved   Liver enzymes normal   The rest of your labs were normal

## 2021-07-29 ENCOUNTER — CONSULT (OUTPATIENT)
Dept: VASCULAR SURGERY | Facility: CLINIC | Age: 61
End: 2021-07-29
Payer: COMMERCIAL

## 2021-07-29 VITALS — HEART RATE: 82 BPM | SYSTOLIC BLOOD PRESSURE: 132 MMHG | DIASTOLIC BLOOD PRESSURE: 70 MMHG

## 2021-07-29 DIAGNOSIS — I83.812 VARICOSE VEINS OF LEFT LOWER EXTREMITY WITH PAIN: ICD-10-CM

## 2021-07-29 PROCEDURE — 3075F SYST BP GE 130 - 139MM HG: CPT | Performed by: PHYSICIAN ASSISTANT

## 2021-07-29 PROCEDURE — 1036F TOBACCO NON-USER: CPT | Performed by: PHYSICIAN ASSISTANT

## 2021-07-29 PROCEDURE — 3078F DIAST BP <80 MM HG: CPT | Performed by: PHYSICIAN ASSISTANT

## 2021-07-29 PROCEDURE — 99244 OFF/OP CNSLTJ NEW/EST MOD 40: CPT | Performed by: PHYSICIAN ASSISTANT

## 2021-07-29 NOTE — PROGRESS NOTES
Assessment/Plan:    Varicose veins of left lower extremity with pain  -     Ambulatory referral to Vascular Surgery  -     Compression Stocking  -     VAS reflux lower limb venous duplex study with reflux assesment, unilateral; Future    -bilateral symptomatic vv which are worse on the LEFT  -tired, cramping legs day and night on several days weekly  -intermittent cramping in L leg when walking up and down the steps  -uses Advil (cautioned patient to only use when absolutely necessary)  -always feels better in morning    Discussion:  We had a detailed discussion regarding varicose veins and the treatment of venous disease  We discussed the role of compression and other conservative measures for relief of symptoms related to varicose veins      - Order for prescription graded compression stockings of 20-30 mm Hg  - Compression, elevation, low sodium diet; regular exercise  - Patient education for venous insufficiency  - Follow up after reflux study in about 3 months with VS        Subjective:      Patient ID: Jhon Restrepo is a 64 y o  female  New patient for varicose veins  Patient states varicose veins became present after pregnancy  She c/o bulging veins and get some releif with elevation  She also c/o itching and swelling  Patient is taking Atorvastatin  Patient is a former smoker  HPI   Jhon Restrepo is a 64 y o  female hypertension, hyperlipidemia, pre-diabetes who is referred to the vascular office for varicose veins with pain  She has longstanding varicose veins with bulge and have been worsening over the years  Ms Devang Perry main complaint is intermittent cramping in the L leg with walking up and down steps  Once she starts to cramping the leg becomes tired  Both legs frequently "cramp" but not every day  Once she has discomfort, it bothers her for the day  She feels some relief with IB and elevation  She has no edema  No injuries  No knee pain  She is very active and exercises  No hx of DVT  The following portions of the patient's history were reviewed and updated as appropriate: allergies, current medications, past family history, past medical history, past social history, past surgical history and problem list     Review of Systems   Constitutional: Negative  HENT: Positive for dental problem  Eyes: Negative  Respiratory: Negative  Cardiovascular: Positive for leg swelling  Painful veins   Gastrointestinal: Positive for abdominal pain (after eating)  Endocrine: Negative  Genitourinary: Negative  Musculoskeletal: Negative  Skin: Negative  Possible ulcer   Allergic/Immunologic: Negative  Neurological: Negative  Hematological: Negative  Psychiatric/Behavioral: Negative  Objective:      /70 (BP Location: Right arm, Patient Position: Sitting, Cuff Size: Standard)   Pulse 82   LMP 02/28/2019     L leg vv     Physical Exam  Vitals and nursing note reviewed  Constitutional:       Appearance: She is well-developed  HENT:      Head: Normocephalic and atraumatic  Eyes:      Pupils: Pupils are equal, round, and reactive to light  Neck:      Thyroid: No thyromegaly  Vascular: No JVD  Trachea: Trachea normal    Cardiovascular:      Rate and Rhythm: Normal rate and regular rhythm  Pulses:           Carotid pulses are 2+ on the right side and 2+ on the left side  Radial pulses are 2+ on the right side and 2+ on the left side  Dorsalis pedis pulses are 2+ on the right side and 2+ on the left side  Heart sounds: Normal heart sounds, S1 normal and S2 normal  No murmur heard  No friction rub  No gallop  Pulmonary:      Effort: Pulmonary effort is normal  No accessory muscle usage or respiratory distress  Breath sounds: Normal breath sounds  No wheezing or rales  Abdominal:      General: Bowel sounds are normal  There is no distension  Palpations: Abdomen is soft  Tenderness:  There is no abdominal tenderness  Musculoskeletal:         General: No deformity  Normal range of motion  Cervical back: Neck supple  Skin:     General: Skin is warm and dry  Findings: No lesion or rash  Nails: There is no clubbing  Neurological:      Mental Status: She is alert and oriented to person, place, and time  Comments: Grossly normal    Psychiatric:         Behavior: Behavior is cooperative  I have reviewed and made appropriate changes to the review of systems input by the medical assistant  Vitals:    21 1126   BP: 132/70   BP Location: Right arm   Patient Position: Sitting   Cuff Size: Standard   Pulse: 82       Patient Active Problem List   Diagnosis    Other hyperlipidemia    Essential hypertension    Vitamin D deficiency    Diaphragmatic hernia    Gastritis    Overweight    Encounter for screening mammogram for breast cancer    Carpal tunnel syndrome of right wrist    Varicose veins of left lower extremity with pain    Diffuse cystic mastopathy    Diverticulosis of colon    Internal hemorrhoids    GERD without esophagitis    Prediabetes    Hot flashes    COVID-19 virus infection       Past Surgical History:   Procedure Laterality Date    BREAST BIOPSY Left 11/15/2011    Ultrasound guided biopsy    BREAST BIOPSY Left 2011    Ultrasound guided biopsy    BREAST SURGERY      reduction     SECTION      HEMORROIDECTOMY      [place] EGD TRANSORAL BIOPSY SINGLE/MULTIPLE N/A 2016    Procedure: ESOPHAGOGASTRODUODENOSCOPY (EGD); Surgeon: Nikole Cabrera MD;  Location: [facility];   Service: General    REDUCTION MAMMAPLASTY Bilateral 2015    TUBAL LIGATION         Family History   Problem Relation Age of Onset    Heart disease Mother         Coronary Artery Bypass Graft    Hyperlipidemia Mother     Kidney failure Mother     Heart failure Mother     Hypertension Father     Hyperlipidemia Brother     No Known Problems Sister  No Known Problems Daughter     Liver cancer Maternal Grandmother     No Known Problems Maternal Grandfather     No Known Problems Paternal Grandmother     No Known Problems Paternal Grandfather     No Known Problems Daughter     Dementia Maternal Aunt     Alcohol abuse Neg Hx     Depression Neg Hx     Drug abuse Neg Hx     Substance Abuse Neg Hx     Mental illness Neg Hx        Social History     Socioeconomic History    Marital status: /Civil Union     Spouse name: Not on file    Number of children: Not on file    Years of education: Not on file    Highest education level: Not on file   Occupational History    Not on file   Tobacco Use    Smoking status: Former Smoker     Types: Cigarettes    Smokeless tobacco: Never Used    Tobacco comment: Never a smoker  per [employer]   Substance and Sexual Activity    Alcohol use: Not Currently     Comment: social - minimum alcohol  consumption per [employer]    Drug use: No    Sexual activity: Not Currently   Other Topics Concern    Not on file   Social History Narrative         Social Determinants of Health     Financial Resource Strain:     Difficulty of Paying Living Expenses:    Food Insecurity:     Worried About Running Out of Food in the Last Year:     Ran Out of Food in the Last Year:    Transportation Needs:     Lack of Transportation (Medical):      Lack of Transportation (Non-Medical):    Physical Activity:     Days of Exercise per Week:     Minutes of Exercise per Session:    Stress:     Feeling of Stress :    Social Connections:     Frequency of Communication with Friends and Family:     Frequency of Social Gatherings with Friends and Family:     Attends Jehovah's witness Services:     Active Member of Clubs or Organizations:     Attends Club or Organization Meetings:     Marital Status:    Intimate Partner Violence:     Fear of Current or Ex-Partner:     Emotionally Abused:     Physically Abused:     Sexually Abused:        No Known Allergies      Current Outpatient Medications:     atorvastatin (LIPITOR) 20 mg tablet, Take 1 tablet (20 mg total) by mouth daily, Disp: 90 tablet, Rfl: 0    lisinopril (ZESTRIL) 10 mg tablet, Take 1 tablet (10 mg total) by mouth daily, Disp: 90 tablet, Rfl: 1    PARoxetine (PAXIL) 10 mg tablet, Take 1 tablet (10 mg total) by mouth daily, Disp: 90 tablet, Rfl: 1    phentermine (ADIPEX-P) 37 5 MG tablet, Take 0 5 tablets (18 75 mg total) by mouth 2 (two) times a day, Disp: 30 tablet, Rfl: 1    RABEprazole (ACIPHEX) 20 MG tablet, Take 1 tablet (20 mg total) by mouth daily, Disp: 90 tablet, Rfl: 0

## 2021-07-29 NOTE — PATIENT INSTRUCTIONS
Varicose veins with pain    -tired, cramping legs day and night on several days weekly  -intermittent cramping in L leg up and down the steps  -Advil  -always feels better in morning      We had a detailed discussion regarding varicose veins and the treatment of venous disease  We discussed the role of compression and other conservative measures for relief of symptoms related to varicose veins      - Order for prescription graded compression stockings of 20-30 mm Hg  - Wear stocking EVERY day to help control swelling in legs and remove at night  - Elevate legs while at rest  - Continue healthy life-style changes; heart-healthy, low sodium diet; regular exercise for weight loss  - Patient education for venous insufficiency  - Follow up after reflux study in about 3 months with VS    Varicose Veins, Ambulatory Care   GENERAL INFORMATION:   Varicose veins  are veins that become large, twisted, and swollen  They are common on the back of your calves, knees, and thighs  Common symptoms include the following:   · Blue, purple, or bulging veins in your legs     · Pain, swelling, or muscle cramps in your legs    · Feeling of heaviness in your legs  Seek immediate care for the following symptoms:   · A wound that does not heal or is infected    · An injury that has broken your skin and caused your varicose veins to bleed    · Swollen and hard leg    · Pain in your leg that does not go away or gets worse    · Legs or feet turn blue or black    · Warmth, tenderness, and pain in your leg (may also look swollen and red)  Treatment of varicose veins  aims to decrease symptoms, improve appearance, and prevent further problems  Treatment will depend on which veins are affected and how severe your condition is  Prescription pain medicine may be given  Ask how to take this medicine safely  Procedures may be done to remove your varicose veins   Your healthcare provider may inject a solution or use a laser to close the varicose veins  Surgery to remove long veins may also be done  Ask your healthcare provider for more information about procedures used in treating varicose veins  Manage varicose veins:   · Wear pressure stockings  The stockings are tight and put pressure on your legs  They improve blood flow and help prevent clots  · Elevate  your legs above the level of your heart for 15 to 30 minutes several times a day  This will help blood to flow back to your heart  · Avoid sitting or standing for long periods of time  This can cause the blood to collect in your legs and make your symptoms worse  Walk around for a few minutes every hour to get blood moving in your legs  · Avoid wearing tight clothing and shoes  Avoid wearing high-heeled shoes  Do not wear clothes that are tight around the waist     · Get plenty of exercise  Talk to your healthcare provider about the best exercise plan for you  Exercise can decrease your blood pressure and improve your health  Bend or rotate your ankles several times every hour  This will help blood to flow back to the heart  · Maintain a healthy weight  Your heart works harder when you are overweight and this can make varicose vein worse  Ask how much you should weigh  Ask him to help you create a weight loss plan if you are overweight  · Do not smoke  If you smoke, it is never too late to quit  Ask for information if you need help quitting  Follow up with your healthcare provider as directed:  Write down your questions so you remember to ask them during your visits  CARE AGREEMENT:   You have the right to help plan your care  Learn about your health condition and how it may be treated  Discuss treatment options with your caregivers to decide what care you want to receive  You always have the right to refuse treatment  The above information is an  only  It is not intended as medical advice for individual conditions or treatments   Talk to your doctor, nurse or pharmacist before following any medical regimen to see if it is safe and effective for you  © 2014 [address] is for End User's use only and may not be sold, redistributed or otherwise used for commercial purposes  All illustrations and images included in CareNotes® are the copyrighted property of A D A M , Inc  or Saw Echavar[employer]

## 2021-09-08 DIAGNOSIS — K21.9 GASTROESOPHAGEAL REFLUX DISEASE WITHOUT ESOPHAGITIS: ICD-10-CM

## 2021-09-08 DIAGNOSIS — E78.2 MIXED HYPERLIPIDEMIA: ICD-10-CM

## 2021-09-08 RX ORDER — ATORVASTATIN CALCIUM 20 MG/1
TABLET, FILM COATED ORAL
Qty: 90 TABLET | Refills: 0 | Status: SHIPPED | OUTPATIENT
Start: 2021-09-08 | End: 2022-01-04 | Stop reason: SDUPTHER

## 2021-09-08 RX ORDER — RABEPRAZOLE SODIUM 20 MG/1
TABLET, DELAYED RELEASE ORAL
Qty: 90 TABLET | Refills: 0 | Status: SHIPPED | OUTPATIENT
Start: 2021-09-08 | End: 2022-01-04 | Stop reason: SDUPTHER

## 2021-09-17 ENCOUNTER — HOSPITAL ENCOUNTER (OUTPATIENT)
Dept: NON INVASIVE DIAGNOSTICS | Facility: CLINIC | Age: 61
Discharge: HOME | End: 2021-09-17
Payer: COMMERCIAL

## 2021-09-17 DIAGNOSIS — I83.812 VARICOSE VEINS OF LEFT LOWER EXTREMITY WITH PAIN: ICD-10-CM

## 2021-09-17 PROCEDURE — 93971 EXTREMITY STUDY: CPT

## 2021-09-17 PROCEDURE — 93971 EXTREMITY STUDY: CPT | Performed by: SURGERY

## 2021-10-26 DIAGNOSIS — I10 ESSENTIAL HYPERTENSION: ICD-10-CM

## 2021-10-26 DIAGNOSIS — R23.2 HOT FLASHES: ICD-10-CM

## 2021-10-26 RX ORDER — PAROXETINE 10 MG/1
TABLET, FILM COATED ORAL
Qty: 90 TABLET | Refills: 1 | Status: SHIPPED | OUTPATIENT
Start: 2021-10-26 | End: 2022-04-20

## 2021-10-26 RX ORDER — LISINOPRIL 10 MG/1
TABLET ORAL
Qty: 90 TABLET | Refills: 1 | Status: SHIPPED | OUTPATIENT
Start: 2021-10-26 | End: 2022-04-20

## 2021-11-01 ENCOUNTER — TELEPHONE (OUTPATIENT)
Dept: VASCULAR SURGERY | Facility: CLINIC | Age: 61
End: 2021-11-01

## 2021-11-01 ENCOUNTER — OFFICE VISIT (OUTPATIENT)
Dept: VASCULAR SURGERY | Facility: CLINIC | Age: 61
End: 2021-11-01
Payer: COMMERCIAL

## 2021-11-01 VITALS
SYSTOLIC BLOOD PRESSURE: 140 MMHG | DIASTOLIC BLOOD PRESSURE: 80 MMHG | HEART RATE: 71 BPM | WEIGHT: 152 LBS | TEMPERATURE: 97.8 F | BODY MASS INDEX: 28.7 KG/M2 | HEIGHT: 61 IN

## 2021-11-01 DIAGNOSIS — I83.812 VARICOSE VEINS OF LEFT LOWER EXTREMITY WITH PAIN: ICD-10-CM

## 2021-11-01 DIAGNOSIS — I83.811 VARICOSE VEINS OF RIGHT LOWER EXTREMITY WITH PAIN: Primary | ICD-10-CM

## 2021-11-01 PROCEDURE — 1036F TOBACCO NON-USER: CPT | Performed by: SURGERY

## 2021-11-01 PROCEDURE — 99213 OFFICE O/P EST LOW 20 MIN: CPT | Performed by: SURGERY

## 2021-11-01 PROCEDURE — 3008F BODY MASS INDEX DOCD: CPT | Performed by: SURGERY

## 2021-11-01 RX ORDER — CHLORHEXIDINE GLUCONATE 0.12 MG/ML
15 RINSE ORAL ONCE
Status: CANCELLED | OUTPATIENT
Start: 2021-11-01 | End: 2021-11-01

## 2021-11-01 RX ORDER — CEFAZOLIN SODIUM 1 G/50ML
1000 SOLUTION INTRAVENOUS ONCE
Status: CANCELLED | OUTPATIENT
Start: 2021-11-01 | End: 2021-11-01

## 2021-11-18 DIAGNOSIS — I83.812 VARICOSE VEINS OF LEFT LOWER EXTREMITY WITH PAIN: Primary | ICD-10-CM

## 2021-12-08 ENCOUNTER — PREP FOR PROCEDURE (OUTPATIENT)
Dept: VASCULAR SURGERY | Facility: CLINIC | Age: 61
End: 2021-12-08

## 2021-12-08 DIAGNOSIS — I83.812 VARICOSE VEINS OF LEFT LOWER EXTREMITY WITH PAIN: Primary | ICD-10-CM

## 2022-01-03 ENCOUNTER — RA CDI HCC (OUTPATIENT)
Dept: OTHER | Facility: HOSPITAL | Age: 62
End: 2022-01-03

## 2022-01-03 NOTE — PROGRESS NOTES
[facility] 75  coding opportunities       Chart reviewed, no opportunity found: CHART REVIEWED, NO OPPORTUNITY FOUND                        Patients insurance company: [employer] (Medicare Advantage and Commercial)

## 2022-01-04 ENCOUNTER — OFFICE VISIT (OUTPATIENT)
Dept: INTERNAL MEDICINE CLINIC | Facility: CLINIC | Age: 62
End: 2022-01-04
Payer: COMMERCIAL

## 2022-01-04 ENCOUNTER — TELEPHONE (OUTPATIENT)
Dept: INTERNAL MEDICINE CLINIC | Facility: CLINIC | Age: 62
End: 2022-01-04

## 2022-01-04 VITALS
HEIGHT: 61 IN | DIASTOLIC BLOOD PRESSURE: 84 MMHG | TEMPERATURE: 97.1 F | BODY MASS INDEX: 29.27 KG/M2 | SYSTOLIC BLOOD PRESSURE: 130 MMHG | WEIGHT: 155 LBS | HEART RATE: 84 BPM | OXYGEN SATURATION: 97 %

## 2022-01-04 DIAGNOSIS — E55.9 VITAMIN D DEFICIENCY: ICD-10-CM

## 2022-01-04 DIAGNOSIS — R73.03 PREDIABETES: ICD-10-CM

## 2022-01-04 DIAGNOSIS — M79.641 BILATERAL HAND PAIN: Primary | ICD-10-CM

## 2022-01-04 DIAGNOSIS — K21.9 GASTROESOPHAGEAL REFLUX DISEASE WITHOUT ESOPHAGITIS: ICD-10-CM

## 2022-01-04 DIAGNOSIS — E78.2 MIXED HYPERLIPIDEMIA: ICD-10-CM

## 2022-01-04 DIAGNOSIS — I83.812 VARICOSE VEINS OF LEFT LOWER EXTREMITY WITH PAIN: ICD-10-CM

## 2022-01-04 DIAGNOSIS — M79.642 BILATERAL HAND PAIN: Primary | ICD-10-CM

## 2022-01-04 DIAGNOSIS — Z71.9 HEALTH COUNSELING: ICD-10-CM

## 2022-01-04 DIAGNOSIS — I10 ESSENTIAL HYPERTENSION: ICD-10-CM

## 2022-01-04 DIAGNOSIS — E66.09 CLASS 1 OBESITY DUE TO EXCESS CALORIES WITHOUT SERIOUS COMORBIDITY WITH BODY MASS INDEX (BMI) OF 30.0 TO 30.9 IN ADULT: ICD-10-CM

## 2022-01-04 DIAGNOSIS — E78.49 OTHER HYPERLIPIDEMIA: ICD-10-CM

## 2022-01-04 DIAGNOSIS — Z23 NEED FOR INFLUENZA VACCINATION: ICD-10-CM

## 2022-01-04 DIAGNOSIS — Z00.00 LABORATORY EXAMINATION ORDERED AS PART OF A ROUTINE GENERAL MEDICAL EXAMINATION: ICD-10-CM

## 2022-01-04 DIAGNOSIS — E66.3 OVERWEIGHT: ICD-10-CM

## 2022-01-04 PROBLEM — U07.1 COVID-19 VIRUS INFECTION: Status: RESOLVED | Noted: 2021-01-14 | Resolved: 2022-01-04

## 2022-01-04 PROBLEM — K29.70 GASTRITIS: Status: RESOLVED | Noted: 2017-09-11 | Resolved: 2022-01-04

## 2022-01-04 PROCEDURE — 90682 RIV4 VACC RECOMBINANT DNA IM: CPT | Performed by: INTERNAL MEDICINE

## 2022-01-04 PROCEDURE — 99214 OFFICE O/P EST MOD 30 MIN: CPT | Performed by: INTERNAL MEDICINE

## 2022-01-04 PROCEDURE — 1036F TOBACCO NON-USER: CPT | Performed by: INTERNAL MEDICINE

## 2022-01-04 PROCEDURE — 90471 IMMUNIZATION ADMIN: CPT | Performed by: INTERNAL MEDICINE

## 2022-01-04 PROCEDURE — 3008F BODY MASS INDEX DOCD: CPT | Performed by: INTERNAL MEDICINE

## 2022-01-04 RX ORDER — PHENTERMINE HYDROCHLORIDE 37.5 MG/1
18.75 TABLET ORAL 2 TIMES DAILY
Qty: 30 TABLET | Refills: 1 | Status: SHIPPED | OUTPATIENT
Start: 2022-01-04

## 2022-01-04 RX ORDER — RABEPRAZOLE SODIUM 20 MG/1
20 TABLET, DELAYED RELEASE ORAL DAILY
Qty: 90 TABLET | Refills: 0 | Status: SHIPPED | OUTPATIENT
Start: 2022-01-04 | End: 2022-04-21

## 2022-01-04 RX ORDER — ATORVASTATIN CALCIUM 20 MG/1
20 TABLET, FILM COATED ORAL DAILY
Qty: 90 TABLET | Refills: 1 | Status: SHIPPED | OUTPATIENT
Start: 2022-01-04 | End: 2022-07-18

## 2022-01-04 NOTE — PROGRESS NOTES
Assessment/Plan:    Varicose veins of left lower extremity with pain  Surgery scheduled  GERD without esophagitis  Taking PPI daily  Gastritis  Resolved, did not see GI  Essential hypertension  BP stable, on lisinopril  Other hyperlipidemia  On statin  Overweight  Lost 7 lbs since last visit  Refilled phentermine x 2 months  PDMP reviewed  Prediabetes  A1c stable at 5 9%  Vitamin D deficiency  Not taking D3  Diagnoses and all orders for this visit:    Bilateral hand pain  Comments:  Suspect OA due to overuse, declined imaging  May take acetaminophen prn  Essential hypertension  -     CBC and differential; Future  -     Comprehensive metabolic panel; Future    Other hyperlipidemia  -     Lipid panel; Future  -     TSH, 3rd generation with Free T4 reflex; Future    Prediabetes  -     Hemoglobin A1C; Future    Overweight    Class 1 obesity due to excess calories without serious comorbidity with body mass index (BMI) of 30 0 to 30 9 in adult  -     phentermine (ADIPEX-P) 37 5 MG tablet; Take 0 5 tablets (18 75 mg total) by mouth 2 (two) times a day    Mixed hyperlipidemia  -     atorvastatin (LIPITOR) 20 mg tablet; Take 1 tablet (20 mg total) by mouth daily    Gastroesophageal reflux disease without esophagitis  -     RABEprazole (ACIPHEX) 20 MG tablet; Take 1 tablet (20 mg total) by mouth daily    Vitamin D deficiency  -     Vitamin D 25 hydroxy; Future    Laboratory examination ordered as part of a routine general medical examination  -     CBC and differential; Future  -     Comprehensive metabolic panel; Future  -     Hemoglobin A1C; Future  -     Lipid panel; Future  -     TSH, 3rd generation with Free T4 reflex; Future  -     Vitamin D 25 hydroxy;  Future    Varicose veins of left lower extremity with pain    Need for influenza vaccination  -     influenza vaccine, quadrivalent, recombinant, PF, 0 5 mL, for patients 18 yr+ (FLUBLOK)    Health counseling  Comments:  Flu vaccine today, received COVID booster  Follow up in 6 months or as needed  Subjective:      Patient ID: Viviane Jo is a 58 y o  female here for a follow up  She complains of intermittent bilateral hand pain  Right hand pain mostly in the thumb area, left hand on all her fingers  She reports no injury, denies any swelling  She takes Tylenol arthritis as needed, asking if there is anything else she can do  She had lost about 10 lbs, requesting for phentermine again  She would like to lose another 10 lbs  She continues to exercise regularly  The following portions of the patient's history were reviewed and updated as appropriate: allergies, current medications, past medical history, past social history and problem list     Review of Systems   Constitutional: Negative for activity change, appetite change and fatigue  HENT: Negative for congestion, ear pain and postnasal drip  Eyes: Negative for visual disturbance  Respiratory: Negative for cough and shortness of breath  Cardiovascular: Negative for chest pain and leg swelling  Gastrointestinal: Negative for abdominal pain, constipation and diarrhea  Genitourinary: Negative for dysuria, frequency and urgency  Musculoskeletal: Positive for arthralgias  Negative for joint swelling and myalgias  Skin: Negative for rash and wound  Neurological: Negative for dizziness, numbness and headaches  Psychiatric/Behavioral: Negative for confusion  The patient is not nervous/anxious  Objective:      /84   Pulse 84   Temp (!) 97 1 °F (36 2 °C)   Ht 5' 1" (1 549 m)   Wt 70 3 kg (155 lb)   LMP 02/28/2019   SpO2 97%   BMI 29 29 kg/m²          Physical Exam  Vitals and nursing note reviewed  Constitutional:       General: She is not in acute distress  Appearance: She is well-developed  HENT:      Head: Normocephalic and atraumatic  Eyes:      Pupils: Pupils are equal, round, and reactive to light     Cardiovascular:      Rate and Rhythm: Normal rate and regular rhythm  Heart sounds: Normal heart sounds  Pulmonary:      Effort: Pulmonary effort is normal       Breath sounds: Normal breath sounds  No wheezing  Abdominal:      General: Bowel sounds are normal       Palpations: Abdomen is soft  Musculoskeletal:         General: No swelling  Right hand: Deformity present  No swelling, tenderness or bony tenderness  Normal range of motion  Normal strength  Left hand: Deformity present  No swelling, tenderness or bony tenderness  Normal range of motion  Normal strength  Right lower leg: No edema  Left lower leg: No edema  Skin:     General: Skin is warm  Findings: No rash  Neurological:      General: No focal deficit present  Mental Status: She is alert and oriented to person, place, and time  Psychiatric:         Mood and Affect: Mood and affect normal  Mood is not anxious or depressed  Behavior: Behavior normal            Labs & imaging results reviewed with patient

## 2022-01-15 ENCOUNTER — IMMUNIZATIONS (OUTPATIENT)
Dept: FAMILY MEDICINE CLINIC | Facility: HOSPITAL | Age: 62
End: 2022-01-15

## 2022-01-15 DIAGNOSIS — Z23 ENCOUNTER FOR IMMUNIZATION: Primary | ICD-10-CM

## 2022-01-15 PROCEDURE — 0001A COVID-19 PFIZER VACC 0.3 ML: CPT

## 2022-01-15 PROCEDURE — 91300 COVID-19 PFIZER VACC 0.3 ML: CPT

## 2022-02-22 ENCOUNTER — VBI (OUTPATIENT)
Dept: ADMINISTRATIVE | Facility: OTHER | Age: 62
End: 2022-02-22

## 2022-02-23 ENCOUNTER — OFFICE VISIT (OUTPATIENT)
Dept: LAB | Facility: CLINIC | Age: 62
End: 2022-02-23
Payer: COMMERCIAL

## 2022-02-23 ENCOUNTER — APPOINTMENT (OUTPATIENT)
Dept: LAB | Facility: CLINIC | Age: 62
End: 2022-02-23
Payer: COMMERCIAL

## 2022-02-23 DIAGNOSIS — I83.811 VARICOSE VEINS OF RIGHT LOWER EXTREMITY WITH PAIN: ICD-10-CM

## 2022-02-23 DIAGNOSIS — I83.812 VARICOSE VEINS OF LEFT LOWER EXTREMITY WITH PAIN: ICD-10-CM

## 2022-02-23 LAB
ANION GAP SERPL CALCULATED.3IONS-SCNC: 9 MMOL/L (ref 4–13)
BUN SERPL-MCNC: 18 MG/DL (ref 5–25)
CALCIUM SERPL-MCNC: 9.1 MG/DL (ref 8.3–10.1)
CHLORIDE SERPL-SCNC: 105 MMOL/L (ref 100–108)
CO2 SERPL-SCNC: 26 MMOL/L (ref 21–32)
CREAT SERPL-MCNC: 0.74 MG/DL (ref 0.6–1.3)
ERYTHROCYTE [DISTWIDTH] IN BLOOD BY AUTOMATED COUNT: 13.4 % (ref 11.6–15.1)
GFR SERPL CREATININE-BSD FRML MDRD: 87 ML/MIN/1.73SQ M
GLUCOSE SERPL-MCNC: 123 MG/DL (ref 65–140)
HCT VFR BLD AUTO: 43.9 % (ref 34.8–46.1)
HGB BLD-MCNC: 14.2 G/DL (ref 11.5–15.4)
MCH RBC QN AUTO: 29.2 PG (ref 26.8–34.3)
MCHC RBC AUTO-ENTMCNC: 32.3 G/DL (ref 31.4–37.4)
MCV RBC AUTO: 90 FL (ref 82–98)
PLATELET # BLD AUTO: 303 THOUSANDS/UL (ref 149–390)
PMV BLD AUTO: 9.3 FL (ref 8.9–12.7)
POTASSIUM SERPL-SCNC: 3.7 MMOL/L (ref 3.5–5.3)
RBC # BLD AUTO: 4.86 MILLION/UL (ref 3.81–5.12)
SODIUM SERPL-SCNC: 140 MMOL/L (ref 136–145)
WBC # BLD AUTO: 7.41 THOUSAND/UL (ref 4.31–10.16)

## 2022-02-23 PROCEDURE — 80048 BASIC METABOLIC PNL TOTAL CA: CPT

## 2022-02-23 PROCEDURE — 36415 COLL VENOUS BLD VENIPUNCTURE: CPT

## 2022-02-23 PROCEDURE — 85027 COMPLETE CBC AUTOMATED: CPT

## 2022-02-23 PROCEDURE — 93005 ELECTROCARDIOGRAM TRACING: CPT

## 2022-02-24 LAB
ATRIAL RATE: 78 BPM
P AXIS: 70 DEGREES
PR INTERVAL: 150 MS
QRS AXIS: 15 DEGREES
QRSD INTERVAL: 76 MS
QT INTERVAL: 388 MS
QTC INTERVAL: 442 MS
T WAVE AXIS: 38 DEGREES
VENTRICULAR RATE: 78 BPM

## 2022-02-24 PROCEDURE — 93010 ELECTROCARDIOGRAM REPORT: CPT | Performed by: INTERNAL MEDICINE

## 2022-02-25 ENCOUNTER — ANESTHESIA EVENT (OUTPATIENT)
Dept: PERIOP | Facility: AMBULARY SURGERY CENTER | Age: 62
End: 2022-02-25
Payer: COMMERCIAL

## 2022-03-03 RX ORDER — IBUPROFEN 200 MG
TABLET ORAL EVERY 6 HOURS PRN
COMMUNITY

## 2022-03-03 NOTE — PRE-PROCEDURE INSTRUCTIONS
Pre-Surgery Instructions:   Medication Instructions    Acetaminophen (Tylenol) 325 MG CAPS Instructed patient per Anesthesia Guidelines   Ascorbic Acid (VITAMIN C PO) Instructed patient per Anesthesia Guidelines   atorvastatin (LIPITOR) 20 mg tablet Instructed patient per Anesthesia Guidelines   ibuprofen (Advil) 200 mg tablet Instructed patient per Anesthesia Guidelines   lisinopril (ZESTRIL) 10 mg tablet Instructed patient per Anesthesia Guidelines   PARoxetine (PAXIL) 10 mg tablet Instructed patient per Anesthesia Guidelines   phentermine (ADIPEX-P) 37 5 MG tablet Instructed patient per Anesthesia Guidelines   RABEprazole (ACIPHEX) 20 MG tablet Instructed patient per Anesthesia Guidelines   VITAMIN D PO Instructed patient per Anesthesia Guidelines  Pt instructed to take paxil and aciphex the morning of surgery with a small sip of water  [facility] preop instructions reviewed with pt  Pt has surgical soap

## 2022-03-04 ENCOUNTER — HOSPITAL ENCOUNTER (OUTPATIENT)
Facility: AMBULARY SURGERY CENTER | Age: 62
Setting detail: OUTPATIENT SURGERY
Discharge: HOME | End: 2022-03-04
Attending: SURGERY | Admitting: SURGERY
Payer: COMMERCIAL

## 2022-03-04 ENCOUNTER — ANESTHESIA (OUTPATIENT)
Dept: PERIOP | Facility: AMBULARY SURGERY CENTER | Age: 62
End: 2022-03-04
Payer: COMMERCIAL

## 2022-03-04 ENCOUNTER — APPOINTMENT (OUTPATIENT)
Dept: ULTRASOUND IMAGING | Facility: AMBULARY SURGERY CENTER | Age: 62
End: 2022-03-04
Payer: COMMERCIAL

## 2022-03-04 VITALS
HEART RATE: 74 BPM | BODY MASS INDEX: 27.97 KG/M2 | HEIGHT: 62 IN | SYSTOLIC BLOOD PRESSURE: 135 MMHG | OXYGEN SATURATION: 98 % | DIASTOLIC BLOOD PRESSURE: 72 MMHG | TEMPERATURE: 97.4 F | RESPIRATION RATE: 16 BRPM | WEIGHT: 152 LBS

## 2022-03-04 DIAGNOSIS — I83.812 VARICOSE VEINS OF LEFT LOWER EXTREMITY WITH PAIN: Primary | ICD-10-CM

## 2022-03-04 PROCEDURE — 36478 ENDOVENOUS LASER 1ST VEIN: CPT | Performed by: SURGERY

## 2022-03-04 PROCEDURE — 93971 EXTREMITY STUDY: CPT

## 2022-03-04 PROCEDURE — 99024 POSTOP FOLLOW-UP VISIT: CPT | Performed by: SURGERY

## 2022-03-04 PROCEDURE — 37765 STAB PHLEB VEINS XTR 10-20: CPT | Performed by: SURGERY

## 2022-03-04 RX ORDER — OXYCODONE HYDROCHLORIDE AND ACETAMINOPHEN 5; 325 MG/1; MG/1
1 TABLET ORAL EVERY 6 HOURS PRN
Qty: 6 TABLET | Refills: 0 | Status: SHIPPED | OUTPATIENT
Start: 2022-03-04

## 2022-03-04 RX ORDER — SODIUM CHLORIDE, SODIUM LACTATE, POTASSIUM CHLORIDE, CALCIUM CHLORIDE 600; 310; 30; 20 MG/100ML; MG/100ML; MG/100ML; MG/100ML
INJECTION, SOLUTION INTRAVENOUS CONTINUOUS PRN
Status: DISCONTINUED | OUTPATIENT
Start: 2022-03-04 | End: 2022-03-04

## 2022-03-04 RX ORDER — ONDANSETRON 2 MG/ML
4 INJECTION INTRAMUSCULAR; INTRAVENOUS ONCE AS NEEDED
Status: DISCONTINUED | OUTPATIENT
Start: 2022-03-04 | End: 2022-03-04 | Stop reason: HOSPADM

## 2022-03-04 RX ORDER — LIDOCAINE HYDROCHLORIDE 10 MG/ML
0.5 INJECTION, SOLUTION EPIDURAL; INFILTRATION; INTRACAUDAL; PERINEURAL ONCE AS NEEDED
Status: DISCONTINUED | OUTPATIENT
Start: 2022-03-04 | End: 2022-03-04 | Stop reason: HOSPADM

## 2022-03-04 RX ORDER — ONDANSETRON 2 MG/ML
INJECTION INTRAMUSCULAR; INTRAVENOUS AS NEEDED
Status: DISCONTINUED | OUTPATIENT
Start: 2022-03-04 | End: 2022-03-04

## 2022-03-04 RX ORDER — FENTANYL CITRATE/PF 50 MCG/ML
25 SYRINGE (ML) INJECTION
Status: COMPLETED | OUTPATIENT
Start: 2022-03-04 | End: 2022-03-04

## 2022-03-04 RX ORDER — OXYCODONE HYDROCHLORIDE AND ACETAMINOPHEN 5; 325 MG/1; MG/1
1 TABLET ORAL EVERY 4 HOURS PRN
Status: DISCONTINUED | OUTPATIENT
Start: 2022-03-04 | End: 2022-03-04 | Stop reason: HOSPADM

## 2022-03-04 RX ORDER — SODIUM CHLORIDE, SODIUM LACTATE, POTASSIUM CHLORIDE, CALCIUM CHLORIDE 600; 310; 30; 20 MG/100ML; MG/100ML; MG/100ML; MG/100ML
75 INJECTION, SOLUTION INTRAVENOUS CONTINUOUS
Status: DISCONTINUED | OUTPATIENT
Start: 2022-03-04 | End: 2022-03-04 | Stop reason: HOSPADM

## 2022-03-04 RX ORDER — KETOROLAC TROMETHAMINE 30 MG/ML
INJECTION, SOLUTION INTRAMUSCULAR; INTRAVENOUS AS NEEDED
Status: DISCONTINUED | OUTPATIENT
Start: 2022-03-04 | End: 2022-03-04

## 2022-03-04 RX ORDER — PROPOFOL 10 MG/ML
INJECTION, EMULSION INTRAVENOUS CONTINUOUS PRN
Status: DISCONTINUED | OUTPATIENT
Start: 2022-03-04 | End: 2022-03-04

## 2022-03-04 RX ORDER — MIDAZOLAM HYDROCHLORIDE 2 MG/2ML
INJECTION, SOLUTION INTRAMUSCULAR; INTRAVENOUS AS NEEDED
Status: DISCONTINUED | OUTPATIENT
Start: 2022-03-04 | End: 2022-03-04

## 2022-03-04 RX ORDER — PROPOFOL 10 MG/ML
INJECTION, EMULSION INTRAVENOUS AS NEEDED
Status: DISCONTINUED | OUTPATIENT
Start: 2022-03-04 | End: 2022-03-04

## 2022-03-04 RX ORDER — CEFAZOLIN SODIUM 1 G/50ML
1000 SOLUTION INTRAVENOUS ONCE
Status: COMPLETED | OUTPATIENT
Start: 2022-03-04 | End: 2022-03-04

## 2022-03-04 RX ORDER — HYDROMORPHONE HCL/PF 1 MG/ML
0.5 SYRINGE (ML) INJECTION
Status: DISCONTINUED | OUTPATIENT
Start: 2022-03-04 | End: 2022-03-04 | Stop reason: HOSPADM

## 2022-03-04 RX ORDER — FENTANYL CITRATE 50 UG/ML
INJECTION, SOLUTION INTRAMUSCULAR; INTRAVENOUS AS NEEDED
Status: DISCONTINUED | OUTPATIENT
Start: 2022-03-04 | End: 2022-03-04

## 2022-03-04 RX ORDER — SODIUM CHLORIDE, SODIUM LACTATE, POTASSIUM CHLORIDE, CALCIUM CHLORIDE 600; 310; 30; 20 MG/100ML; MG/100ML; MG/100ML; MG/100ML
125 INJECTION, SOLUTION INTRAVENOUS CONTINUOUS
Status: DISCONTINUED | OUTPATIENT
Start: 2022-03-04 | End: 2022-03-04 | Stop reason: HOSPADM

## 2022-03-04 RX ORDER — TRISODIUM CITRATE DIHYDRATE AND CITRIC ACID MONOHYDRATE 500; 334 MG/5ML; MG/5ML
30 SOLUTION ORAL ONCE
Status: COMPLETED | OUTPATIENT
Start: 2022-03-04 | End: 2022-03-04

## 2022-03-04 RX ORDER — ACETAMINOPHEN 325 MG/1
650 TABLET ORAL EVERY 4 HOURS PRN
Status: DISCONTINUED | OUTPATIENT
Start: 2022-03-04 | End: 2022-03-04 | Stop reason: HOSPADM

## 2022-03-04 RX ORDER — CHLORHEXIDINE GLUCONATE 0.12 MG/ML
15 RINSE ORAL ONCE
Status: DISCONTINUED | OUTPATIENT
Start: 2022-03-04 | End: 2022-03-04 | Stop reason: HOSPADM

## 2022-03-04 RX ADMIN — FENTANYL CITRATE 50 MCG: 50 INJECTION, SOLUTION INTRAMUSCULAR; INTRAVENOUS at 10:00

## 2022-03-04 RX ADMIN — FENTANYL CITRATE 25 MCG: 50 INJECTION INTRAMUSCULAR; INTRAVENOUS at 11:15

## 2022-03-04 RX ADMIN — SODIUM CITRATE AND CITRIC ACID MONOHYDRATE 30 ML: 500; 334 SOLUTION ORAL at 09:04

## 2022-03-04 RX ADMIN — ACETAMINOPHEN 650 MG: 325 TABLET, FILM COATED ORAL at 11:49

## 2022-03-04 RX ADMIN — FENTANYL CITRATE 25 MCG: 50 INJECTION INTRAMUSCULAR; INTRAVENOUS at 11:03

## 2022-03-04 RX ADMIN — MIDAZOLAM HYDROCHLORIDE 2 MG: 1 INJECTION, SOLUTION INTRAMUSCULAR; INTRAVENOUS at 09:58

## 2022-03-04 RX ADMIN — KETOROLAC TROMETHAMINE 15 MG: 30 INJECTION, SOLUTION INTRAMUSCULAR at 10:51

## 2022-03-04 RX ADMIN — FENTANYL CITRATE 25 MCG: 50 INJECTION INTRAMUSCULAR; INTRAVENOUS at 11:11

## 2022-03-04 RX ADMIN — PROPOFOL 20 MG: 10 INJECTION, EMULSION INTRAVENOUS at 10:04

## 2022-03-04 RX ADMIN — FENTANYL CITRATE 25 MCG: 50 INJECTION, SOLUTION INTRAMUSCULAR; INTRAVENOUS at 10:39

## 2022-03-04 RX ADMIN — ONDANSETRON 4 MG: 2 INJECTION INTRAMUSCULAR; INTRAVENOUS at 10:08

## 2022-03-04 RX ADMIN — FENTANYL CITRATE 25 MCG: 50 INJECTION INTRAMUSCULAR; INTRAVENOUS at 11:08

## 2022-03-04 RX ADMIN — CEFAZOLIN SODIUM 1000 MG: 1 SOLUTION INTRAVENOUS at 10:01

## 2022-03-04 RX ADMIN — SODIUM CHLORIDE, SODIUM LACTATE, POTASSIUM CHLORIDE, AND CALCIUM CHLORIDE: .6; .31; .03; .02 INJECTION, SOLUTION INTRAVENOUS at 10:01

## 2022-03-04 RX ADMIN — FENTANYL CITRATE 25 MCG: 50 INJECTION, SOLUTION INTRAMUSCULAR; INTRAVENOUS at 10:31

## 2022-03-04 RX ADMIN — PROPOFOL 140 MCG/KG/MIN: 10 INJECTION, EMULSION INTRAVENOUS at 10:04

## 2022-03-04 NOTE — ANESTHESIA POSTPROCEDURE EVALUATION
Post-Op Assessment Note    CV Status:  Stable  Pain Score: 0    Pain management: adequate     Mental Status:  Sleepy   Hydration Status:  Stable   PONV Controlled:  None   Airway Patency:  Patent      Post Op Vitals Reviewed: Yes      Staff: CRNA         No complications documented      BP   123/72   Temp  97 4F   Pulse  62   Resp   12   SpO2   97% 6L FM

## 2022-03-04 NOTE — OP NOTE
OPERATIVE REPORT  PATIENT NAME: Stacy Officer    :  1960  MRN: [id number]  Pt Location: [facility] OR ROOM 05    SURGERY DATE: 3/4/2022    Surgeon(s) and Role:     Charli Abdi MD - Primary    Varicose veins of left lower extremity with pain    Post-Op Diagnosis Codes: * Varicose veins of left lower extremity with pain      Procedure(s):  ENDOVASCULAR LASER THERAPY (EVLT) LEFT GREATER SAPHENOUS VEIN WITH MULTIPLE STAB PHLEBECTOMIES x 17    Specimen(s):  * No specimens in log *    Estimated Blood Loss:   Minimal    Drains:  * No LDAs found *    Anesthesia Type:   Conscious Sedation     Operative Indications:  Varicose veins of right lower extremity with pain [I83 811]    Operative Findings:  Successful ablation of the left greater saphenous vein  Stab phlebectomy x 17    Complications:   None    Procedure and Technique:  The patient was brought to the operating room where she was positioned supine on the OR table  After adequate induction of anesthesia with MAC the left leg was circumferentially prepped using chlorhexidine and draped in usual sterile fashion  A formal time-out was performed and all were in agreement  The duplex ultrasound was brought to the field and the saphenous vein in the left leg was mapped from the knee to the groin  The vein was then cannulated with the micropuncture set and the guidewires exchanged for the 035 J-wire which passed easily into the external iliac vein  The laser sheath was inserted over the guidewire and the laser fiber was then positioned 2-3 cm from the saphenofemoral junction  Tumescent anesthesia was then instilled under ultrasound guidance along the entire length of the left greater saphenous vein and the laser was then activated at [facility] and withdrawn over the course of 191 seconds  Following this insonation of the saphenofemoral junction confirmed patency and flow through the junction and no evidence of deep vein thrombosis      The patient was then placed in the Trendelenburg position and previously marked truncal varicosities on the left leg were avulsed through microphlebectomy incisions  A total of 17 incisions were made  Hemostasis was secured with compression at each puncture site and the incisions were then closed at the completion of the procedure with exofin  The leg was then wrapped with 4x4 gauze, kerlix, Ace wrap and Coban from the foot to the groin  The patient was transferred to recovery in stable condition   I was present for the entire procedure and a qualified resident physician was not available      Patient Disposition:  PACU  and hemodynamically stable    SIGNATURE: Jeffry Mosqueda MD  DATE: March 4, 2022  TIME: 10:53 AM

## 2022-03-04 NOTE — ANESTHESIA PREPROCEDURE EVALUATION
Procedure:  ENDOVASCULAR LASER THERAPY (EVLT) LEFT GREATER SAPHENOUS VEIN WITH MULTIPLE STAB PHLEBECTOMIES (Left Leg Upper)  MULTIPLE STAB PHLEBECTOMIES (Left Leg)    Relevant Problems   ANESTHESIA   (+) PONV (postoperative nausea and vomiting) (only with breast surgery)      CARDIO   (+) Essential hypertension   (+) Other hyperlipidemia      GI/HEPATIC   (+) GERD without esophagitis (some symptoms this am)   (+) Hiatal hernia      PULMONARY (within normal limits)   (-) Sleep apnea   (-) Smoking   (-) URI (upper respiratory infection)      Other   (+) Prediabetes      Physical Exam    Airway    Mallampati score: II  TM Distance: >3 FB  Neck ROM: full     Dental   No notable dental hx     Cardiovascular      Pulmonary      Other Findings       Lab Results   Component Value Date    WBC 7 41 02/23/2022    HGB 14 2 02/23/2022     02/23/2022     Lab Results   Component Value Date    SODIUM 140 02/23/2022    K 3 7 02/23/2022    BUN 18 02/23/2022    CREATININE 0 74 02/23/2022    EGFR 87 02/23/2022    GLUCOSE 99 04/14/2015     Lab Results   Component Value Date    HGBA1C 5 9 (H) 07/22/2021     Anesthesia Plan  ASA Score- 2     Anesthesia Type- IV sedation with anesthesia with ASA Monitors  Additional Monitors:   Airway Plan:     Comment: Backup GA  Plan Factors-Exercise tolerance (METS): >4 METS  Chart reviewed  Existing labs reviewed  Patient summary reviewed  Patient is not a current smoker  Induction- intravenous  Postoperative Plan-     Informed Consent- Anesthetic plan and risks discussed with patient  I personally reviewed this patient with the CRNA  Discussed and agreed on the Anesthesia Plan with the CRNA Gerhardt Sep

## 2022-03-04 NOTE — H&P
H&P Exam - Vascular Surgery   Dayday Matthews 58 y o  female MRN: [id number]  Unit/Bed#: OR POOL Encounter: [id number]    Assessment/Plan     Assessment:  Symptomatic left lower extremity varicosities    Plan:  Left GSV EVLT with multiple stab phlebectomies  All questions answered  Left leg marked  Informed consent obtained in office  History of Present Illness     HPI:  Dayday Matthews is a 58 y o  female with HTN, HLD, GERD, symptomatic left lower extremity varicosities who presents for left leg venous intervention  She reports continued left leg heaviness and aching with pruritis and bulging varicosities  She has been wearing therapeutic compression stockings without significant improvement  She elevates her legs and the swelling is better in the morning  She has ankle edema particularly at the end of the day  She states that she is interested in therapeutic venous intervention  Review of Systems   Constitutional: Negative  HENT: Negative  Eyes: Negative  Respiratory: Negative  Cardiovascular: Negative  Gastrointestinal: Negative  Endocrine: Negative  Genitourinary: Negative  Musculoskeletal:        Left leg heaviness and aching   Skin:        Left leg bulging varicosities and pruritis   Allergic/Immunologic: Negative  Neurological: Negative  Hematological: Negative  Psychiatric/Behavioral: Negative          Historical Information   Past Medical History:   Diagnosis Date    GERD (gastroesophageal reflux disease)     Hyperlipidemia     Hypertension     Hypokalemia 2015    Obesity     PONV (postoperative nausea and vomiting)     Varicose veins of both lower extremities      Past Surgical History:   Procedure Laterality Date    BREAST BIOPSY Left 11/15/2011    Ultrasound guided biopsy    BREAST BIOPSY Left 2011    Ultrasound guided biopsy    BREAST SURGERY      reduction     SECTION      COLONOSCOPY      HEMORROIDECTOMY      [place] EGD TRANSORAL BIOPSY SINGLE/MULTIPLE N/A 12/13/2016    Procedure: ESOPHAGOGASTRODUODENOSCOPY (EGD); Surgeon: Lia Mackenzie MD;  Location: [facility]; Service: General    REDUCTION MAMMAPLASTY Bilateral 01/05/2015    TUBAL LIGATION       Social History   Social History     Substance and Sexual Activity   Alcohol Use Yes    Comment: social - minimum alcohol  consumption per [employer]     Social History     Substance and Sexual Activity   Drug Use No     Social History     Tobacco Use   Smoking Status Never Smoker   Smokeless Tobacco Never Used   Tobacco Comment    Never a smoker  per [employer]     E-Cigarette/Vaping    E-Cigarette Use Never User      E-Cigarette/Vaping Substances     Family History: non-contributory    Meds/Allergies   all current active meds have been reviewed  No Known Allergies    Objective   Vitals: Blood pressure 142/79, pulse 70, temperature (!) 97 2 °F (36 2 °C), temperature source Temporal, resp  rate 18, height 5' 1 5" (1 562 m), weight 68 9 kg (152 lb), last menstrual period 02/28/2019, SpO2 100 %  ,Body mass index is 28 26 kg/m²  No intake or output data in the 24 hours ending 03/04/22 0947  Invasive Devices  Report    Peripheral Intravenous Line            Peripheral IV 03/04/22 Proximal;Right;Ventral (anterior) Forearm <1 day                Physical Exam  Constitutional:       Appearance: Normal appearance  HENT:      Head: Normocephalic and atraumatic  Cardiovascular:      Rate and Rhythm: Normal rate  Pulmonary:      Effort: Pulmonary effort is normal    Abdominal:      Palpations: Abdomen is soft  Musculoskeletal:         General: Normal range of motion  Cervical back: Normal range of motion and neck supple  Skin:     General: Skin is warm and dry  Capillary Refill: Capillary refill takes less than 2 seconds  Comments: Left leg truncal varicosities   Neurological:      General: No focal deficit present        Mental Status: She is alert and oriented to person, place, and time    Psychiatric:         Mood and Affect: Mood normal          Behavior: Behavior normal          Thought Content: Thought content normal          Judgment: Judgment normal          Lab Results: I have personally reviewed pertinent reports  Imaging: I have personally reviewed pertinent reports  EKG, Pathology, and Other Studies: I have personally reviewed pertinent reports  Code Status: No Order  Advance Directive and Living Will:      Power of :    POLST:      Counseling / Coordination of Care  Counseling/Coordination of Care: Total floor / unit time spent today 15 minutes  Greater than 50% of total time was spent with the patient and / or family counseling and / or coordination of care   A description of the counseling / coordination of care: left leg venous intervention

## 2022-03-04 NOTE — DISCHARGE INSTRUCTIONS
DISCHARGE INSTRUCTIONS  VARICOSE VEIN SURGERY    ACTIVITY:  On the day of your operation, take it easy  You can take short walks around the house  When sitting, the leg should be elevated  The preferred position is to have the leg at or above the level of the heart  Starting on the first day after surgery, light walking is encouraged as tolerated  After your ultrasound test, you can resume your normal activity, but no heavy lifting (do not lift more than 15 pounds) or strenuous exercise for 2 weeks  You should not drive a car until your bandages are removed and you are off all narcotic pain medication  You may ride in a car  DIET: Resume your normal diet  Good nutrition is important for healing of your incision  DRESSINGS/SURGICAL SITE:  When released from the hospital, you should have a compression bandage in place on the operated leg  This bandage should feel snug, but not too tight  If the bandage becomes blood soaked or painfully tight, elevate your leg and call the office ([phone])  You may have surgical glue on your incision sites  There are stitches present under the skin which will absorb on their own  The glue is used to cover the incision, assist in closure, and prevent contamination  This adhesive will darken and peel away on its own within one to two weeks  Do not pick at it  You should shower daily  Wash incisions daily with soap and water, but do not rub or scrub the incisions; rinse thoroughly and pat dry  If the operated leg becomes increasingly painful or swollen, or if there is increasing redness or pain around your incision, contact our office  Some bruising of the skin is common after varicose vein surgery  This can be lessened by elevation of the leg  Many patients will notice some numbness of the shin, ankle, calf, or the top of the foot  This usually improves with time but may be persistent    After surgery you can expect bruising, swelling and hard knots on your leg  As your body heals the bruising will fade and the swelling and knots will subside  Apply sunscreen with SPF 30 to incisions while sun bathing for up to one year after surgery to reduce the chances of your incisions darkening  FOLLOW UP STUDIES:  Your first post-operative appointment will be 2-3 days after your surgery  At this appointment your bandages will be removed, and you will be seen by a Vascular Surgeon, Nurse Practitioner, or Physician Assistant  An appointment for a follow up Doppler ultrasound study will be scheduled on the same day as your follow up appointment  FOLLOW UP APPOINTMENTS:  Making and keeping follow up appointments and ultrasound tests are important to your recovery  If you have difficulty making it to or keeping your follow up appointments, call the office  If you have increased pain, fever >101 5, increased drainage, redness or a bad smell at your surgery site, new coldness/numbness of your arm or leg, please call us immediately and GO directly to the ER  PLEASE CALL THE OFFICE IF YOU HAVE ANY QUESTIONS  [phone] Naresh Tong [phone]  [address] [place] [address] , [address], [place], [address]  [address], [address] [facility], [address] [facility]  [address], [facility] the [place], [address] [facility]  [address] [facility], [address] [facility], [facility], [address] [facility] [address]  [address], [address], [address]  [facility], [address] [facility], [address] [facility], [address] [facility]  [address] [facility], [address] [facility], [address]  One [facility], [address] [facility], [address] [facility],[address], Zeus Pereira 6  [address] [facility], Rj [place], [address]  [address] [place], [place] [address]

## 2022-03-07 ENCOUNTER — OFFICE VISIT (OUTPATIENT)
Dept: VASCULAR SURGERY | Facility: CLINIC | Age: 62
End: 2022-03-07

## 2022-03-07 ENCOUNTER — HOSPITAL ENCOUNTER (OUTPATIENT)
Dept: NON INVASIVE DIAGNOSTICS | Facility: CLINIC | Age: 62
Discharge: HOME | End: 2022-03-07
Payer: COMMERCIAL

## 2022-03-07 VITALS
DIASTOLIC BLOOD PRESSURE: 64 MMHG | BODY MASS INDEX: 28.41 KG/M2 | HEIGHT: 62 IN | SYSTOLIC BLOOD PRESSURE: 140 MMHG | HEART RATE: 77 BPM | WEIGHT: 154.4 LBS

## 2022-03-07 DIAGNOSIS — I83.812 VARICOSE VEINS OF LEFT LOWER EXTREMITY WITH PAIN: ICD-10-CM

## 2022-03-07 DIAGNOSIS — I83.812 VARICOSE VEINS OF LEFT LOWER EXTREMITY WITH PAIN: Primary | ICD-10-CM

## 2022-03-07 DIAGNOSIS — E66.3 OVERWEIGHT: ICD-10-CM

## 2022-03-07 PROCEDURE — 99024 POSTOP FOLLOW-UP VISIT: CPT | Performed by: PHYSICIAN ASSISTANT

## 2022-03-07 PROCEDURE — 93971 EXTREMITY STUDY: CPT | Performed by: SURGERY

## 2022-03-07 PROCEDURE — NC001 PR NO CHARGE: Performed by: SURGERY

## 2022-03-07 PROCEDURE — 93971 EXTREMITY STUDY: CPT

## 2022-03-07 NOTE — PROGRESS NOTES
Assessment/Plan:    S/P  Left GSV EVLT with multiple stab phlebectomies (2/4/22, Latoya)    Varicose veins of left lower extremity with pain    Overweight      -mild leg pain and heaviness  -no problems walking; no chest pain, SOB; no f/c              Exam: L leg with mild ecchymoses  Band of redness mid-thigh from the bandage  Multiple stab sites intact and secured with Steri-Strips  No drainage or bleeding  No evidence of infection  No significant swelling  Calf non-tender  2+ DP pulses  A/P      Overall stable on POD #3 after L EVLT and stab phlebectomy  She has mild pain and discomfort as expected after vein surgery  She is walking well  No acute concerns  Plan for limited venous duplex later today  We discussed venous insufficiency, post EVLT instructions and follow up      -graded compression when comfortable and able to tolerate  -Activity as tolerated    -We discussed concerning sx/sx of infection and blood cts for which patient should call to be seen sooner    -Follow up 4-6 weeks with Dr Albino Todd or SUN      Subjective:      Patient ID: Romero Winchester is a 58 y o  female  Pt is p/o L EVLT w/ multiple stabs 3/4/2022  Pt c/o burning sensation and heaviness   in left leg where stabs are  Pt is taking Atorvastatin  Pt is a former smoker  HPI    Romero Winchester is a 58 y o  female with HTN, HLD, GERD, symptomatic left lower extremity varicosities with continued left leg heaviness and aching with pruritis and bulging varicosities despite therapeutic compression stockings and other conservative measures  3/7/22: Ms Becka Nagy presents on postop day 3 for follow-up  Over the past 3 days she has been taking it easy and raising her leg  She describes leg pain and discomfort as expected after vein surgery, but has had no problems walking  She reports that the top of the bandage was tight so she had cut it back a little bit it still remained tight and she has a bruise in that area    After the bandage was removed she had some burning in the leg which quickly settled down  No chest pain, shortness of breath  No fevers or chills  She will have her postop venous duplex later today  She is going to take it easy for 1 week and hold off on dancing  Unless she would have increased leg pain, swelling or other concerns need to be seen sooner plan to see her back in 1 month  The following portions of the patient's history were reviewed and updated as appropriate: allergies, current medications, past family history, past medical history, past social history, past surgical history and problem list     Review of Systems   Constitutional: Negative  HENT: Negative  Eyes: Negative  Respiratory: Negative  Cardiovascular: Negative  Gastrointestinal: Negative  Endocrine: Negative  Genitourinary: Negative  Musculoskeletal: Negative  Skin: Negative  Allergic/Immunologic: Negative  Neurological: Negative  Burning in left leg   Hematological: Negative  Psychiatric/Behavioral: Negative  Objective:      /64 (BP Location: Right arm, Patient Position: Sitting, Cuff Size: Standard)   Pulse 77   Ht 5' 1 5" (1 562 m)   Wt 70 kg (154 lb 6 4 oz)   LMP 02/28/2019   BMI 28 70 kg/m²        Physical Exam                AA+O x 3  NAD  Resp non-labored  RRR    L leg with mild ecchymoses  Band of redness mid-thigh from the bandage  Multiple stab sites intact and secured with Steri-Strips  No drainage  No evidence of infection  No active bleeding  No significant swelling  2+ DP pulses  I have reviewed and made appropriate changes to the review of systems input by the medical assistant      Vitals:    03/07/22 1309   BP: 140/64   BP Location: Right arm   Patient Position: Sitting   Cuff Size: Standard   Pulse: 77   Weight: 70 kg (154 lb 6 4 oz)   Height: 5' 1 5" (1 562 m)       Patient Active Problem List   Diagnosis    Other hyperlipidemia    Essential hypertension    Vitamin D deficiency    Hiatal hernia    Overweight    Encounter for screening mammogram for breast cancer    Carpal tunnel syndrome of right wrist    Varicose veins of left lower extremity with pain    Diffuse cystic mastopathy    Diverticulosis of colon    Internal hemorrhoids    GERD without esophagitis    Prediabetes    Hot flashes    PONV (postoperative nausea and vomiting)       Past Surgical History:   Procedure Laterality Date    BREAST BIOPSY Left 11/15/2011    Ultrasound guided biopsy    BREAST BIOPSY Left 2011    Ultrasound guided biopsy    BREAST SURGERY      reduction     SECTION      COLONOSCOPY      HEMORROIDECTOMY      [place] EGD TRANSORAL BIOPSY SINGLE/MULTIPLE N/A 2016    Procedure: ESOPHAGOGASTRODUODENOSCOPY (EGD); Surgeon: Hilario Malloy MD;  [facility];   Service: General    SC ENDOVENOUS LASER, 1ST VEIN Left 3/4/2022    Procedure: ENDOVASCULAR LASER THERAPY (EVLT) LEFT GREATER SAPHENOUS VEIN WITH MULTIPLE STAB PHLEBECTOMIES;  Surgeon: Jhony Bauman MD;  [facility];  Service: Vascular    SC PHLEB VEINS - EXTREM 20+ Left 3/4/2022    Procedure: MULTIPLE STAB PHLEBECTOMIES;  Surgeon: Jhony Bauman MD;  [facility];  Service: Vascular    REDUCTION MAMMAPLASTY Bilateral 2015    TUBAL LIGATION         Family History   Problem Relation Age of Onset    Heart disease Mother         Coronary Artery Bypass Graft    Hyperlipidemia Mother     Kidney failure Mother     Heart failure Mother     Hypertension Father     Hyperlipidemia Brother     No Known Problems Sister     No Known Problems Daughter     Liver cancer Maternal Grandmother     No Known Problems Maternal Grandfather     No Known Problems Paternal Grandmother     No Known Problems Paternal Grandfather     No Known Problems Daughter     Dementia Maternal Aunt     Alcohol abuse Neg Hx     Depression Neg Hx     Drug abuse Neg Hx     Substance Abuse Neg Hx     Mental illness Neg Hx        Social History     Socioeconomic History    Marital status: /Civil Union     Spouse name: Not on file    Number of children: Not on file    Years of education: Not on file    Highest education level: Not on file   Occupational History    Not on file   Tobacco Use    Smoking status: Never Smoker    Smokeless tobacco: Never Used    Tobacco comment: Never a smoker  per [employer]   Vaping Use    Vaping Use: Never used   Substance and Sexual Activity    Alcohol use: Yes     Comment: social - minimum alcohol  consumption per [employer]    Drug use: No    Sexual activity: Not on file   Other Topics Concern    Not on file   Social History Narrative         Social Determinants of Health     Financial Resource Strain: Not on file   Food Insecurity: Not on file   Transportation Needs: Not on file   Physical Activity: Not on file   Stress: Not on file   Social Connections: Not on file   Intimate Partner Violence: Not on file   Housing Stability: Not on file       No Known Allergies      Current Outpatient Medications:     Acetaminophen (Tylenol) 325 MG CAPS, Take by mouth, Disp: , Rfl:     Ascorbic Acid (VITAMIN C PO), Take by mouth, Disp: , Rfl:     atorvastatin (LIPITOR) 20 mg tablet, Take 1 tablet (20 mg total) by mouth daily, Disp: 90 tablet, Rfl: 1    ibuprofen (Advil) 200 mg tablet, Take by mouth every 6 (six) hours as needed for mild pain, Disp: , Rfl:     lisinopril (ZESTRIL) 10 mg tablet, TAKE 1 TABLET BY MOUTH EVERY DAY, Disp: 90 tablet, Rfl: 1    oxyCODONE-acetaminophen (Percocet) 5-325 mg per tablet, Take 1 tablet by mouth every 6 (six) hours as needed for moderate pain for up to 6 doses Max Daily Amount: 4 tablets, Disp: 6 tablet, Rfl: 0    PARoxetine (PAXIL) 10 mg tablet, TAKE 1 TABLET BY MOUTH EVERY DAY, Disp: 90 tablet, Rfl: 1    phentermine (ADIPEX-P) 37 5 MG tablet, Take 0 5 tablets (18 75 mg total) by mouth 2 (two) times a day, Disp: 30 tablet, Rfl: 1    RABEprazole (ACIPHEX) 20 MG tablet, Take 1 tablet (20 mg total) by mouth daily, Disp: 90 tablet, Rfl: 0    VITAMIN D PO, Take by mouth, Disp: , Rfl:

## 2022-03-07 NOTE — PATIENT INSTRUCTIONS
S/P L EVLT w/ multiple stabs 3/4/2022 by Dr Cipriano Mcleod    Mild bruising, swelling and tenderness as expected after surgery  You will have your the left leg venous duplex today  Each day he should be feeling a little bit better  Resume activity as tolerated and gradually get back to dancing!      - Graded compression when comfortable and able to tolerate  - Elevate leg as often as you can  - Activity as tolerated  - Continue healthy lifestyle changes; heart-healthy diet, low sodium and regular walking   - Tylenol or ice for mild soreness  - Patient education for venous insufficiency  - May shower; avoid lotions, creams to leg until everything is healed  - Call right away, if you feel ill, develop fever, chest pain, shortness of breath, increased leg pain,  redness at procedure sites, bleeding, numbness of the leg or pain/discoloration of feet      - Follow up 4-6 weeks with Dr Cipriano Mcleod or AP

## 2022-04-11 ENCOUNTER — OFFICE VISIT (OUTPATIENT)
Dept: VASCULAR SURGERY | Facility: CLINIC | Age: 62
End: 2022-04-11

## 2022-04-11 VITALS
TEMPERATURE: 97.8 F | BODY MASS INDEX: 28.34 KG/M2 | DIASTOLIC BLOOD PRESSURE: 76 MMHG | RESPIRATION RATE: 16 BRPM | HEIGHT: 62 IN | SYSTOLIC BLOOD PRESSURE: 136 MMHG | HEART RATE: 80 BPM | WEIGHT: 154 LBS

## 2022-04-11 DIAGNOSIS — I83.812 VARICOSE VEINS OF LEFT LOWER EXTREMITY WITH PAIN: Primary | ICD-10-CM

## 2022-04-11 PROCEDURE — 99024 POSTOP FOLLOW-UP VISIT: CPT | Performed by: SURGERY

## 2022-04-11 NOTE — PROGRESS NOTES
Assessment/Plan:    Varicose veins of left lower extremity with pain  Symptomatic left lower extremity varicosities s/p left GSV EVLT with multiple stab phlebectomies 3/4/22  Doing very well  Symptoms have resolved  She wears tight under armour leggings which provides compression but does not tolerate compression socks well due to menopause symptoms  She reports some residual mild fullness/bruising along the upper medial calf which is improving  She elevates her legs routinely     -We discussed post-op recovery, resolution of hematoma and scarring as well as possibility of recurrent symptoms    -Recommend continuing conservative measures as tolerated  This includes the daily use of gradient compression socks, periodic leg elevation and regular exercise  -Follow-up as needed         Diagnoses and all orders for this visit:    Varicose veins of left lower extremity with pain        I have spent 15 minutes with Patient  today in which greater than 50% of this time was spent in counseling/coordination of care regarding Intructions for management, Importance of tx compliance and Impressions  Subjective:      Patient ID: Humaira Antunez is a 58 y o  female  Patient presents in office for follow up from L EVLT done on 3/4/2022 by Dr Kassy Medrano  Patient had a Post EVLT doppler done on 3/7/2022  Patient reports some swelling in the L inner leg by the knee  Patient denies any pain or discomfort  Patient is taking atorvastatin  HPI  Ms Vimal Salvador is a 63yo female with symptomatic left leg varicosities s/p left GSV EVLT with stabs 3/4/22  Doing well  Small left calf hematoma and bruising is gradually resolving  Pruritis and pain resolved  She is wearing tight leggings when on her feet particularly for dancing but not really tolerating compression socks  She continues to elevate frequently      The following portions of the patient's history were reviewed and updated as appropriate: allergies, current medications, past family history, past medical history, past social history, past surgical history and problem list     Review of Systems   Constitutional: Negative  Negative for chills and fever  HENT: Negative  Negative for hearing loss and trouble swallowing  Eyes: Negative  Respiratory: Negative  Negative for cough, chest tightness and shortness of breath  Cardiovascular: Positive for leg swelling  Gastrointestinal: Negative  Negative for diarrhea, nausea and vomiting  Endocrine: Negative  Genitourinary: Negative  Musculoskeletal: Negative  Negative for gait problem  Skin: Negative  Negative for wound  Allergic/Immunologic: Negative  Neurological: Negative  Negative for dizziness, speech difficulty, weakness, numbness and headaches  Hematological: Negative  Does not bruise/bleed easily  Psychiatric/Behavioral: Negative  Negative for self-injury and suicidal ideas  I have personally reviewed the ROS entered by MA and agree as documented  Objective:      /76 (BP Location: Right arm, Patient Position: Sitting, Cuff Size: Adult)   Pulse 80   Temp 97 8 °F (36 6 °C) (Tympanic)   Resp 16   Ht 5' 1 5" (1 562 m)   Wt 69 9 kg (154 lb)   LMP 02/28/2019   BMI 28 63 kg/m²          Physical Exam  Constitutional:       Appearance: Normal appearance  HENT:      Head: Normocephalic and atraumatic  Cardiovascular:      Rate and Rhythm: Normal rate  Pulmonary:      Effort: Pulmonary effort is normal    Abdominal:      Palpations: Abdomen is soft  Musculoskeletal:         General: Normal range of motion  Cervical back: Normal range of motion and neck supple  Skin:     General: Skin is warm and dry  Capillary Refill: Capillary refill takes less than 2 seconds  Comments: Mild fullness and bruising left medial calf, incisions healed   Neurological:      General: No focal deficit present  Mental Status: She is alert and oriented to person, place, and time  Psychiatric:         Mood and Affect: Mood normal          Behavior: Behavior normal          Thought Content:  Thought content normal          Judgment: Judgment normal

## 2022-04-11 NOTE — ASSESSMENT & PLAN NOTE
Symptomatic left lower extremity varicosities s/p left GSV EVLT with multiple stab phlebectomies 3/4/22  Doing very well  Symptoms have resolved  She wears tight under armour leggings which provides compression but does not tolerate compression socks well due to menopause symptoms  She reports some residual mild fullness/bruising along the upper medial calf which is improving  She elevates her legs routinely     -We discussed post-op recovery, resolution of hematoma and scarring as well as possibility of recurrent symptoms    -Recommend continuing conservative measures as tolerated    This includes the daily use of gradient compression socks, periodic leg elevation and regular exercise  -Follow-up as needed

## 2022-04-11 NOTE — PATIENT INSTRUCTIONS
Varicose veins of left lower extremity with pain  Symptomatic left lower extremity varicosities s/p left GSV EVLT with multiple stab phlebectomies 3/4/22  Doing very well  Symptoms have resolved  She wears tight under armour leggings which provides compression but does not tolerate compression socks well due to menopause symptoms  She reports some residual mild fullness/bruising along the upper medial calf which is improving   She elevates her legs routinely      -We discussed post-op recovery, resolution of hematoma and scarring as well as possibility of recurrent symptoms    -Recommend continuing conservative measures as tolerated   This includes the daily use of gradient compression socks, periodic leg elevation and regular exercise  -Follow-up as needed

## 2022-04-20 DIAGNOSIS — R23.2 HOT FLASHES: ICD-10-CM

## 2022-04-20 DIAGNOSIS — I10 ESSENTIAL HYPERTENSION: ICD-10-CM

## 2022-04-20 RX ORDER — PAROXETINE 10 MG/1
TABLET, FILM COATED ORAL
Qty: 90 TABLET | Refills: 1 | Status: SHIPPED | OUTPATIENT
Start: 2022-04-20

## 2022-04-20 RX ORDER — LISINOPRIL 10 MG/1
TABLET ORAL
Qty: 90 TABLET | Refills: 1 | Status: SHIPPED | OUTPATIENT
Start: 2022-04-20

## 2022-04-21 DIAGNOSIS — K21.9 GASTROESOPHAGEAL REFLUX DISEASE WITHOUT ESOPHAGITIS: ICD-10-CM

## 2022-04-21 RX ORDER — RABEPRAZOLE SODIUM 20 MG/1
TABLET, DELAYED RELEASE ORAL
Qty: 90 TABLET | Refills: 0 | Status: SHIPPED | OUTPATIENT
Start: 2022-04-21 | End: 2022-07-19

## 2022-06-13 ENCOUNTER — VBI (OUTPATIENT)
Dept: ADMINISTRATIVE | Facility: OTHER | Age: 62
End: 2022-06-13

## 2022-06-29 ENCOUNTER — PREPPED CHART (OUTPATIENT)
Dept: URBAN - METROPOLITAN AREA CLINIC 6 | Facility: CLINIC | Age: 62
End: 2022-06-29

## 2022-07-16 DIAGNOSIS — E78.2 MIXED HYPERLIPIDEMIA: ICD-10-CM

## 2022-07-18 RX ORDER — ATORVASTATIN CALCIUM 20 MG/1
TABLET, FILM COATED ORAL
Qty: 90 TABLET | Refills: 1 | Status: SHIPPED | OUTPATIENT
Start: 2022-07-18

## 2022-07-19 DIAGNOSIS — K21.9 GASTROESOPHAGEAL REFLUX DISEASE WITHOUT ESOPHAGITIS: ICD-10-CM

## 2022-07-19 RX ORDER — RABEPRAZOLE SODIUM 20 MG/1
TABLET, DELAYED RELEASE ORAL
Qty: 90 TABLET | Refills: 0 | Status: SHIPPED | OUTPATIENT
Start: 2022-07-19 | End: 2022-10-17

## 2022-07-25 ENCOUNTER — TELEMEDICINE (OUTPATIENT)
Dept: INTERNAL MEDICINE CLINIC | Facility: CLINIC | Age: 62
End: 2022-07-25
Payer: COMMERCIAL

## 2022-07-25 DIAGNOSIS — U07.1 COVID-19: Primary | ICD-10-CM

## 2022-07-25 LAB — SARS-COV-2 AG UPPER RESP QL IA: ABNORMAL

## 2022-07-25 PROCEDURE — 99213 OFFICE O/P EST LOW 20 MIN: CPT | Performed by: INTERNAL MEDICINE

## 2022-07-25 RX ORDER — FLUTICASONE PROPIONATE 50 MCG
1 SPRAY, SUSPENSION (ML) NASAL DAILY
Qty: 16 G | Refills: 1 | Status: SHIPPED | OUTPATIENT
Start: 2022-07-25

## 2022-07-25 NOTE — PROGRESS NOTES
COVID-19 Outpatient Progress Note    Assessment/Plan:    Problem List Items Addressed This Visit        Other    COVID-19 - Primary    Relevant Medications    fluticasone (FLONASE) 50 mcg/act nasal spray         Disposition:     Discussed symptom directed medication options with patient  Recommend to quarantine for 5 days  Monitor for fevers  Explained antibiotics will not work for her symptoms  I have spent 7 minutes directly with the patient  Greater than 50% of this time was spent in counseling/coordination of care regarding: risks and benefits of treatment options, instructions for management, patient and family education and impressions  Encounter provider Julia Ulrich MD    Provider located at [address] [place] [phone]    Recent Visits  No visits were found meeting these conditions  Showing recent visits within past 7 days and meeting all other requirements  Today's Visits  Date Type Provider Dept   07/25/22 Telemedicine Julia Ulrich, [facility] Med   Showing today's visits and meeting all other requirements  Future Appointments  No visits were found meeting these conditions  Showing future appointments within next 150 days and meeting all other requirements     This virtual check-in was done via [employer] and patient was informed that this is a secure, HIPAA-compliant platform  She agrees to proceed  Patient agrees to participate in a virtual check in via telephone or video visit instead of presenting to the office to address urgent/immediate medical needs  Patient is aware this is a billable service  After connecting through [facility], the patient was identified by name and date of birth  Weston Nazario was informed that this was a telemedicine visit and that the exam was being conducted confidentially over secure lines  My office door was closed   No one else was in the room  Melissa Lisa acknowledged consent and understanding of privacy and security of the telemedicine visit  I informed the patient that I have reviewed her record in Epic and presented the opportunity for her to ask any questions regarding the visit today  The patient agreed to participate  Verification of patient location:  Patient is located in the following state in which I hold an active license: [place]    Subjective:   Melissa Lisa is a 58 y o  female who is concerned about COVID-19  Patient's symptoms include nasal congestion, rhinorrhea and sore throat  Patient denies fever, chills, fatigue, malaise, shortness of breath, chest tightness, nausea, vomiting, diarrhea and headaches  - Date of symptom onset: 2022      COVID-19 vaccination status: Fully vaccinated with booster    Exposure:   Contact with a person who is under investigation (PUI) for or who is positive for COVID-19 within the last 14 days?: No    She reports nasal congestion with clear or yellow discharge for the past 2 weeks  No fever or chills  She tested 3 times in the past 2 weeks for COVID and it was negative  Today, she has a mild sore throat, no fever or chills  Home COVID test was (+) today  No GI symptoms  She has been taking over the counter decongestant, Robitussin      Lab Results   Component Value Date    SARSCOV2 Detected (A) 2021     Past Medical History:   Diagnosis Date    GERD (gastroesophageal reflux disease)     Hyperlipidemia     Hypertension     Hypokalemia 2015    Obesity     PONV (postoperative nausea and vomiting)     Varicose veins of both lower extremities      Past Surgical History:   Procedure Laterality Date    BREAST BIOPSY Left 11/15/2011    Ultrasound guided biopsy    BREAST BIOPSY Left 2011    Ultrasound guided biopsy    BREAST SURGERY      reduction     SECTION      COLONOSCOPY      HEMORROIDECTOMY      VT EGD TRANSORAL BIOPSY SINGLE/MULTIPLE N/A 2016 Procedure: ESOPHAGOGASTRODUODENOSCOPY (EGD); Surgeon: Mike Lopes MD;  [facility]; Service: General    ME ENDOVENOUS LASER, 1ST VEIN Left 3/4/2022    Procedure: ENDOVASCULAR LASER THERAPY (EVLT) LEFT GREATER SAPHENOUS VEIN WITH MULTIPLE STAB PHLEBECTOMIES;  Surgeon: Juli Lopez MD;  [facility];  Service: Vascular    ME PHLEB VEINS - EXTREM 20+ Left 3/4/2022    Procedure: MULTIPLE STAB PHLEBECTOMIES;  Surgeon: Juli Lopez MD;  [facility];  Service: Vascular    REDUCTION MAMMAPLASTY Bilateral 01/05/2015    TUBAL LIGATION       Current Outpatient Medications   Medication Sig Dispense Refill    fluticasone (FLONASE) 50 mcg/act nasal spray 1 spray into each nostril daily 16 g 1    Acetaminophen (Tylenol) 325 MG CAPS Take by mouth      Ascorbic Acid (VITAMIN C PO) Take by mouth      atorvastatin (LIPITOR) 20 mg tablet TAKE 1 TABLET BY MOUTH EVERY DAY 90 tablet 1    ibuprofen (Advil) 200 mg tablet Take by mouth every 6 (six) hours as needed for mild pain      lisinopril (ZESTRIL) 10 mg tablet TAKE 1 TABLET BY MOUTH EVERY DAY 90 tablet 1    oxyCODONE-acetaminophen (Percocet) 5-325 mg per tablet Take 1 tablet by mouth every 6 (six) hours as needed for moderate pain for up to 6 doses Max Daily Amount: 4 tablets 6 tablet 0    PARoxetine (PAXIL) 10 mg tablet TAKE 1 TABLET BY MOUTH EVERY DAY 90 tablet 1    phentermine (ADIPEX-P) 37 5 MG tablet Take 0 5 tablets (18 75 mg total) by mouth 2 (two) times a day (Patient not taking: Reported on 4/11/2022 ) 30 tablet 1    RABEprazole (ACIPHEX) 20 MG tablet TAKE 1 TABLET BY MOUTH EVERY DAY 90 tablet 0    VITAMIN D PO Take by mouth       No current facility-administered medications for this visit  No Known Allergies    Review of Systems   Constitutional: Negative for activity change, chills, fatigue and fever  HENT: Positive for congestion, rhinorrhea and sore throat      Respiratory: Negative for chest tightness and shortness of breath  Gastrointestinal: Negative for diarrhea, nausea and vomiting  Neurological: Negative for dizziness and headaches  Objective: There were no vitals filed for this visit  Physical Exam  Constitutional:       General: She is not in acute distress  Appearance: Normal appearance  She is not ill-appearing  HENT:      Head: Normocephalic and atraumatic  Mouth/Throat:      Mouth: Mucous membranes are moist    Eyes:      Pupils: Pupils are equal, round, and reactive to light  Pulmonary:      Effort: Pulmonary effort is normal  No respiratory distress  Neurological:      General: No focal deficit present  Mental Status: She is alert and oriented to person, place, and time  Psychiatric:         Mood and Affect: Mood normal          Behavior: Behavior normal          VIRTUAL VISIT DISCLAIMER    Fran Arias verbally agrees to participate in [employer]  Pt is aware that [employer] could be limited without vital signs or the ability to perform a full hands-on physical exam  Mary Au understands she or the provider may request at any time to terminate the video visit and request the patient to seek care or treatment in person

## 2022-08-09 ENCOUNTER — VBI (OUTPATIENT)
Dept: ADMINISTRATIVE | Facility: OTHER | Age: 62
End: 2022-08-09

## 2022-09-09 ENCOUNTER — VBI (OUTPATIENT)
Dept: ADMINISTRATIVE | Facility: OTHER | Age: 62
End: 2022-09-09

## 2022-10-17 DIAGNOSIS — K21.9 GASTROESOPHAGEAL REFLUX DISEASE WITHOUT ESOPHAGITIS: ICD-10-CM

## 2022-10-17 RX ORDER — RABEPRAZOLE SODIUM 20 MG/1
TABLET, DELAYED RELEASE ORAL
Qty: 90 TABLET | Refills: 0 | Status: SHIPPED | OUTPATIENT
Start: 2022-10-17

## 2022-10-25 ENCOUNTER — VBI (OUTPATIENT)
Dept: ADMINISTRATIVE | Facility: OTHER | Age: 62
End: 2022-10-25

## 2022-10-29 DIAGNOSIS — U07.1 COVID-19: ICD-10-CM

## 2022-10-31 RX ORDER — FLUTICASONE PROPIONATE 50 MCG
SPRAY, SUSPENSION (ML) NASAL
Qty: 16 ML | Refills: 1 | Status: SHIPPED | OUTPATIENT
Start: 2022-10-31

## 2022-11-06 DIAGNOSIS — I10 ESSENTIAL HYPERTENSION: ICD-10-CM

## 2022-11-06 DIAGNOSIS — R23.2 HOT FLASHES: ICD-10-CM

## 2022-11-07 RX ORDER — LISINOPRIL 10 MG/1
TABLET ORAL
Qty: 90 TABLET | Refills: 0 | Status: SHIPPED | OUTPATIENT
Start: 2022-11-07 | End: 2022-11-08

## 2022-11-07 RX ORDER — PAROXETINE 10 MG/1
TABLET, FILM COATED ORAL
Qty: 90 TABLET | Refills: 0 | Status: SHIPPED | OUTPATIENT
Start: 2022-11-07 | End: 2022-11-08

## 2022-11-08 RX ORDER — LISINOPRIL 10 MG/1
10 TABLET ORAL DAILY
Qty: 90 TABLET | Refills: 0 | Status: SHIPPED | OUTPATIENT
Start: 2022-11-08

## 2022-11-08 RX ORDER — PAROXETINE 10 MG/1
10 TABLET, FILM COATED ORAL DAILY
Qty: 90 TABLET | Refills: 0 | Status: SHIPPED | OUTPATIENT
Start: 2022-11-08

## 2022-11-23 ENCOUNTER — VBI (OUTPATIENT)
Dept: ADMINISTRATIVE | Facility: OTHER | Age: 62
End: 2022-11-23

## 2023-01-10 ENCOUNTER — VBI (OUTPATIENT)
Dept: ADMINISTRATIVE | Facility: OTHER | Age: 63
End: 2023-01-10

## 2023-01-14 DIAGNOSIS — K21.9 GASTROESOPHAGEAL REFLUX DISEASE WITHOUT ESOPHAGITIS: ICD-10-CM

## 2023-01-14 RX ORDER — RABEPRAZOLE SODIUM 20 MG/1
TABLET, DELAYED RELEASE ORAL
Qty: 90 TABLET | Refills: 0 | Status: SHIPPED | OUTPATIENT
Start: 2023-01-14

## 2023-01-17 ENCOUNTER — HOSPITAL ENCOUNTER (OUTPATIENT)
Dept: MAMMOGRAPHY | Facility: MEDICAL CENTER | Age: 63
Discharge: HOME | End: 2023-01-17

## 2023-01-17 VITALS — BODY MASS INDEX: 28.34 KG/M2 | HEIGHT: 62 IN | WEIGHT: 154 LBS

## 2023-01-17 DIAGNOSIS — Z12.31 ENCOUNTER FOR SCREENING MAMMOGRAM FOR MALIGNANT NEOPLASM OF BREAST: ICD-10-CM

## 2023-01-30 ENCOUNTER — TELEPHONE (OUTPATIENT)
Dept: INTERNAL MEDICINE CLINIC | Facility: CLINIC | Age: 63
End: 2023-01-30

## 2023-01-30 NOTE — TELEPHONE ENCOUNTER
Please confirm with her if she got the 2 shot series of Shingles vaccine in the past  If not, please schedule

## 2023-02-11 DIAGNOSIS — I10 ESSENTIAL HYPERTENSION: ICD-10-CM

## 2023-02-12 RX ORDER — LISINOPRIL 10 MG/1
TABLET ORAL
Qty: 90 TABLET | Refills: 0 | Status: SHIPPED | OUTPATIENT
Start: 2023-02-12

## 2023-03-16 NOTE — TELEPHONE ENCOUNTER
Make sure your stay hydrated, can try Gatorade or other sports drinks  Recommend to eat bland foods for next few days - soup, crackers, pasta  No dairy, heavy meats / foods  Subjective:       Patient ID: Pierce Segundo is a 66 y.o. male.    Chief Complaint: Hypertension, Blood sugar, and Lab review    Mr. Pierce Segundo is a 66-year-old gentleman who comes for follow-up.  This is his 6 months follow-up.  Couple of his previous appointments were canceled because of my rescheduling issues.    Overall he is doing well any promises me that he had lost weight up to 229 lb after my recommendations and suggestions.  However recently after the holidays he gained some of it back and thus lost all the advantage of new look that he wanted to show to me.    Blood pressures are doing okay.  Blood sugars have risen up a little bit on the labs.  Currently I have not formally diagnosed him with diabetes but prediabetes.    Several preventive care issues also have been discussed again including colorectal screening (actually follow-up for surveillance colonoscopy) and pneumonia vaccine.  He states that he got the pneumonia vaccine from a local pharmacy but this does not seem to be updated in the link system.  I have advised him to get that proof from the pharmacy.  He is also pending eye examination.      There is a family history of prostate cancer in father.  Thus far his PSA levels are in acceptable range with appropriate age-related rise.    He did get booster dosage for COVID vaccine.    Hypertension  This is a chronic problem. The current episode started more than 1 year ago. The problem is controlled. Pertinent negatives include no anxiety, chest pain, headaches, neck pain, palpitations, shortness of breath or sweats. Risk factors for coronary artery disease include male gender. Past treatments include ACE inhibitors. The current treatment provides moderate improvement. Compliance problems include diet and exercise.  There is no history of coarctation of the aorta, hyperaldosteronism, pheochromocytoma or sleep apnea.     Past Medical History:   Diagnosis Date    Hyperlipidemia     Hypertension       Social History     Socioeconomic History    Marital status:      Spouse name: Zoila    Number of children: 3   Occupational History    Occupation: Retired      Comment: [employer]   Tobacco Use    Smoking status: Never    Smokeless tobacco: Never   Substance and Sexual Activity    Alcohol use: Yes     Comment: occasional    Drug use: No    Sexual activity: Yes     Partners: Female   Social History Narrative    2 G Lori 39 , radha 27     1 B TJ 35        GC 3     Social Determinants of Health     Financial Resource Strain: Low Risk     Difficulty of Paying Living Expenses: Not hard at all   Food Insecurity: No Food Insecurity    Worried About Running Out of Food in the Last Year: Never true    Ran Out of Food in the Last Year: Never true   Transportation Needs: No Transportation Needs    Lack of Transportation (Medical): No    Lack of Transportation (Non-Medical): No   Physical Activity: Sufficiently Active    Days of Exercise per Week: 6 days    Minutes of Exercise per Session: 40 min   Stress: No Stress Concern Present    Feeling of Stress : Only a little   Social Connections: Unknown    Frequency of Communication with Friends and Family: More than three times a week    Frequency of Social Gatherings with Friends and Family: Twice a week    Active Member of Clubs or Organizations: Yes    Attends Club or Organization Meetings: More than 4 times per year    Marital Status:    Housing Stability: Low Risk     Unable to Pay for Housing in the Last Year: No    Number of Places Lived in the Last Year: 1    Unstable Housing in the Last Year: No     Past Surgical History:   Procedure Laterality Date    COLONOSCOPY  2017    EYE SURGERY       Family History   Problem Relation Age of Onset    Hypertension Mother     Dementia Mother     Emphysema Father     Cancer Father         Prostate & Esophageal CA    Hypertension Sister         Takes 10 mg of lisinopril.       Review of Systems  "  Constitutional:  Negative for activity change, chills, fatigue and unexpected weight change (Weight loss and weight gain).   HENT:  Negative for hearing loss, rhinorrhea and trouble swallowing.    Eyes:  Negative for discharge and visual disturbance.   Respiratory:  Negative for chest tightness, shortness of breath and wheezing.    Cardiovascular:  Negative for chest pain and palpitations.   Gastrointestinal:  Negative for abdominal pain, blood in stool, constipation, diarrhea and vomiting.   Endocrine: Negative for polydipsia and polyuria.        Elevated blood sugar and thus far being diagnosed with prediabetes.   Genitourinary:  Negative for difficulty urinating, frequency, hematuria and urgency.        Nocturia x3.  Drink plenty of fluids towards the evening.  No major symptoms of erectile dysfunction.   Musculoskeletal:  Negative for arthralgias, joint swelling and neck pain.   Skin:  Negative for color change, rash and wound.   Allergic/Immunologic: Negative for environmental allergies and immunocompromised state.   Neurological:  Negative for dizziness, weakness and headaches.   Psychiatric/Behavioral:  Negative for behavioral problems, confusion and dysphoric mood.        Objective:      Blood pressure 129/78, pulse 86, height 5' 11" (1.803 m), weight 112.2 kg (247 lb 4.8 oz), SpO2 98 %. Body mass index is 34.49 kg/m².  Physical Exam  Vitals and nursing note reviewed.   Constitutional:       General: He is not in acute distress.     Appearance: Normal appearance. He is well-developed. He is not ill-appearing, toxic-appearing or diaphoretic.      Comments: BMI 34.49   HENT:      Head: Normocephalic and atraumatic.   Eyes:      General: No scleral icterus.        Right eye: No discharge.         Left eye: No discharge.   Neck:      Thyroid: No thyromegaly.      Vascular: No JVD.      Trachea: No tracheal deviation.   Cardiovascular:      Rate and Rhythm: Normal rate and regular rhythm.      Heart sounds: " Normal heart sounds. No murmur heard.    No friction rub. No gallop.   Pulmonary:      Effort: Pulmonary effort is normal. No respiratory distress.      Breath sounds: Normal breath sounds. No wheezing or rales.   Abdominal:      General: There is no distension.      Palpations: Abdomen is soft.      Tenderness: There is no abdominal tenderness.   Musculoskeletal:         General: No tenderness or deformity.      Cervical back: Neck supple.      Right lower leg: No edema.      Left lower leg: No edema.   Lymphadenopathy:      Cervical: No cervical adenopathy.   Skin:     General: Skin is warm and dry.      Findings: No lesion or rash.      Comments: Dry skin   Neurological:      Mental Status: He is alert. Mental status is at baseline.      Motor: No weakness.   Psychiatric:         Behavior: Behavior normal.         Thought Content: Thought content normal.         Judgment: Judgment normal.         Assessment:       1. Benign essential HTN    2. Prediabetes    3. Polyp of colon, unspecified part of colon, unspecified type           Lab Visit on 03/07/2023   Component Date Value Ref Range Status    Hemoglobin A1C 03/07/2023 6.3 (H)  4.5 - 6.2 % Final    Estimated Avg Glucose 03/07/2023 134 (H)  68 - 131 mg/dL Final    PSA, Screen 03/07/2023 1.8  0.00 - 4.00 ng/mL Final    Cholesterol 03/07/2023 208 (H)  120 - 199 mg/dL Final    Triglycerides 03/07/2023 77  30 - 150 mg/dL Final    HDL 03/07/2023 42  40 - 75 mg/dL Final    LDL Cholesterol 03/07/2023 150.6  63.0 - 159.0 mg/dL Final    HDL/Cholesterol Ratio 03/07/2023 20.2  20.0 - 50.0 % Final    Total Cholesterol/HDL Ratio 03/07/2023 5.0  2.0 - 5.0 Final    Non-HDL Cholesterol 03/07/2023 166  mg/dL Final    Sodium 03/07/2023 138  136 - 145 mmol/L Final    Potassium 03/07/2023 4.4  3.5 - 5.1 mmol/L Final    Chloride 03/07/2023 102  95 - 110 mmol/L Final    CO2 03/07/2023 27  23 - 29 mmol/L Final    Glucose 03/07/2023 147 (H)  70 - 110 mg/dL Final    BUN 03/07/2023 29  (H)  8 - 23 mg/dL Final    Creatinine 03/07/2023 1.0  0.5 - 1.4 mg/dL Final    Calcium 03/07/2023 9.3  8.7 - 10.5 mg/dL Final    Anion Gap 03/07/2023 9  8 - 16 mmol/L Final    eGFR 03/07/2023 >60.0  >60 mL/min/1.73 m^2 Final         Plan:           Benign essential HTN  -     Basic Metabolic Panel; Future; Expected date: 07/10/2023    Prediabetes  -     Hemoglobin A1C; Future; Expected date: 07/10/2023    Polyp of colon, unspecified part of colon, unspecified type  -     Ambulatory referral/consult to Gastroenterology; Future; Expected date: 06/16/2023      Clemente did manage to lose weight to 229 lb.  Unfortunately he  had to miss a couple of appointments because of my rescheduling.  Has gained some of the weight back.  His blood pressures are doing good.      His sugars are still on the high side.  Needs to watch out for that and I will throw a challenge for him to lose another 4 or 6 lb of weight in the next 6 months or so.      He will continue with his lisinopril at this point.      His PSA shows a natural rise without any alarming levels.  Family history of prostate cancer and father also has been noted.  I would like him to find out if his father's PSA was high when the detected his prostate cancer.      Now that he is 66 he has changed his insurance to an advantage plan.  We should start talking about pneumonia vaccines, eye examination and also colorectal screening.He will be due for surveillance colonoscopy in a few months or so.  The last 1 was done in 2017 and they had found a couple of polyps.  This was done by Dr. Paulo Mason    He does go to the bathroom 2 or 3 times at night.  He does drink a lot of fluids at night and I have advised him to pre Pone his fluid to perhaps no later than 6:00 p.m..  This will at least save him from 1 extra trip to the bathroom at night.      His wife needs to keep an eye on him if he has any symptoms of sleep apnea like snoring or stopping breathing.      Skin changes have  been noted and he gets regular dermatology evaluation.      His blood sugars are elevated and I will check a hemoglobin A1c again in 6 months time.  Follow-up in 6 months time.  PSA is okay.  Continue with COVID precautions.      Please bring immunization shots and records from the [facility]'s so that we can update our system also.        Current Outpatient Medications:     lisinopriL 10 MG tablet, Take 1 tablet (10 mg total) by mouth once daily., Disp: 90 tablet, Rfl: 2    multivitamin capsule, Take 1 capsule by mouth once daily., Disp: , Rfl:     prednisoLONE acetate (PRED FORTE) 1 % DrpS, INSTILL 1 DROP INTO RIGHT EYE EVERY DAY, Disp: , Rfl:     timolol maleate 0.5% (TIMOPTIC) 0.5 % Drop, , Disp: , Rfl:     Current Facility-Administered Medications:     sodium chloride 0.9% flush 10 mL, 10 mL, Intravenous, PRN, Karen Garcia, BRIANP-C

## 2023-03-27 ENCOUNTER — APPOINTMENT (OUTPATIENT)
Dept: LAB | Facility: CLINIC | Age: 63
End: 2023-03-27

## 2023-03-30 ENCOUNTER — TELEPHONE (OUTPATIENT)
Dept: OBGYN CLINIC | Facility: OTHER | Age: 63
End: 2023-03-30

## 2023-03-30 ENCOUNTER — OFFICE VISIT (OUTPATIENT)
Dept: INTERNAL MEDICINE CLINIC | Facility: CLINIC | Age: 63
End: 2023-03-30

## 2023-03-30 VITALS
HEIGHT: 62 IN | WEIGHT: 174 LBS | TEMPERATURE: 97.6 F | SYSTOLIC BLOOD PRESSURE: 130 MMHG | BODY MASS INDEX: 32.02 KG/M2 | DIASTOLIC BLOOD PRESSURE: 88 MMHG | OXYGEN SATURATION: 95 % | HEART RATE: 84 BPM

## 2023-03-30 DIAGNOSIS — E78.2 MIXED HYPERLIPIDEMIA: ICD-10-CM

## 2023-03-30 DIAGNOSIS — E66.09 CLASS 1 OBESITY DUE TO EXCESS CALORIES WITHOUT SERIOUS COMORBIDITY WITH BODY MASS INDEX (BMI) OF 30.0 TO 30.9 IN ADULT: ICD-10-CM

## 2023-03-30 DIAGNOSIS — E78.49 OTHER HYPERLIPIDEMIA: ICD-10-CM

## 2023-03-30 DIAGNOSIS — K21.9 GERD WITHOUT ESOPHAGITIS: ICD-10-CM

## 2023-03-30 DIAGNOSIS — Z23 ENCOUNTER FOR IMMUNIZATION: ICD-10-CM

## 2023-03-30 DIAGNOSIS — E66.09 CLASS 1 OBESITY DUE TO EXCESS CALORIES WITHOUT SERIOUS COMORBIDITY WITH BODY MASS INDEX (BMI) OF 32.0 TO 32.9 IN ADULT: ICD-10-CM

## 2023-03-30 DIAGNOSIS — M79.641 RIGHT HAND PAIN: Primary | ICD-10-CM

## 2023-03-30 DIAGNOSIS — Z71.9 HEALTH COUNSELING: ICD-10-CM

## 2023-03-30 DIAGNOSIS — J30.2 SEASONAL ALLERGIES: ICD-10-CM

## 2023-03-30 DIAGNOSIS — I10 ESSENTIAL HYPERTENSION: ICD-10-CM

## 2023-03-30 DIAGNOSIS — R73.03 PREDIABETES: ICD-10-CM

## 2023-03-30 PROBLEM — U07.1 COVID-19: Status: RESOLVED | Noted: 2021-01-14 | Resolved: 2023-03-30

## 2023-03-30 PROBLEM — E66.811 CLASS 1 OBESITY DUE TO EXCESS CALORIES WITHOUT SERIOUS COMORBIDITY WITH BODY MASS INDEX (BMI) OF 32.0 TO 32.9 IN ADULT: Status: ACTIVE | Noted: 2018-02-21

## 2023-03-30 RX ORDER — TOPIRAMATE 50 MG/1
50 TABLET, FILM COATED ORAL DAILY
Qty: 90 TABLET | Refills: 0 | Status: SHIPPED | OUTPATIENT
Start: 2023-03-30

## 2023-03-30 RX ORDER — PHENTERMINE HYDROCHLORIDE 37.5 MG/1
18.75 TABLET ORAL 2 TIMES DAILY
Qty: 30 TABLET | Refills: 1 | Status: SHIPPED | OUTPATIENT
Start: 2023-03-30

## 2023-03-30 RX ORDER — ATORVASTATIN CALCIUM 40 MG/1
40 TABLET, FILM COATED ORAL DAILY
Qty: 90 TABLET | Refills: 1 | Status: SHIPPED | OUTPATIENT
Start: 2023-03-30

## 2023-03-30 NOTE — ASSESSMENT & PLAN NOTE
Gained about 20 lbs since over a year ago  Restart phentermine, add topiramate  She will check with insurance if Drea Ora covered

## 2023-03-30 NOTE — TELEPHONE ENCOUNTER
Patient is being referred to a orthopedics. Please schedule accordingly.    [facility]   [phone]

## 2023-03-30 NOTE — PROGRESS NOTES
Assessment/Plan:    Varicose veins of left lower extremity with pain  S/p surgery  Prediabetes  A1c stable at 6%  Discussed diet  Other hyperlipidemia  LDL remains elevated  Increase atorvastatin to 40 mg daily  Class 1 obesity due to excess calories without serious comorbidity with body mass index (BMI) of 32 0 to 32 9 in adult  Gained about 20 lbs since over a year ago  Restart phentermine, add topiramate  She will check with insurance if Nimesh Brooms covered  GERD without esophagitis  Reviewed low acid diet  Switch PPI to bedtime  May take additional famotidine prn  Essential hypertension  DBP slightly elevated  Continue lisinopril 10 mg daily  Monitor BP at home  Seasonal allergies  Start using Flonase daily  Vitamin D deficiency  Encouraged to take D3  Diagnoses and all orders for this visit:    Right hand pain  Comments:  Pain worse, suspect OA vs tendonitis  Declined imaging  Refer to Ortho  Limit NSAID use  Orders:  -     Ambulatory referral to Orthopedic Surgery; Future    Prediabetes    GERD without esophagitis    Class 1 obesity due to excess calories without serious comorbidity with body mass index (BMI) of 30 0 to 30 9 in adult  -     phentermine (ADIPEX-P) 37 5 MG tablet; Take 0 5 tablets (18 75 mg total) by mouth 2 (two) times a day  -     topiramate (Topamax) 50 MG tablet; Take 1 tablet (50 mg total) by mouth daily    Mixed hyperlipidemia  -     atorvastatin (LIPITOR) 40 mg tablet; Take 1 tablet (40 mg total) by mouth daily    Seasonal allergies    Essential hypertension    Class 1 obesity due to excess calories without serious comorbidity with body mass index (BMI) of 32 0 to 32 9 in adult    Other hyperlipidemia    Encounter for immunization  -     Zoster Vaccine Recombinant IM    Health counseling  Comments:  Shingrix today  Mammogram updated  Colonoscopy due  Follow up in 5 months or as needed  Subjective:      Patient ID: Gurdeep Dawson is a 61 y o  "female here for a follow up  She reports worsening bilateral hand pain, R>L  She is right handed  Pain worse with certain activities  No swelling or injury  She has been taking Advil almost daily  She has been stressed recently  She and her  are now living together with her daughter, and new grand child  She complains of weight gain since her last visit  She felt the phentermine did help  She does not want to know how much weight she gained  She reports more frequent acid reflux symptoms, takes her medication every morning  The following portions of the patient's history were reviewed and updated as appropriate: allergies, current medications, past medical history, past social history and problem list     Review of Systems   Constitutional: Negative for activity change, appetite change and fatigue  HENT: Positive for congestion, postnasal drip and rhinorrhea  Negative for ear pain, sinus pressure and sinus pain  Eyes: Negative for visual disturbance  Respiratory: Negative for cough, chest tightness and shortness of breath  Cardiovascular: Negative for chest pain and leg swelling  Gastrointestinal: Negative for abdominal pain, constipation and diarrhea  Genitourinary: Negative for dysuria, frequency and urgency  Musculoskeletal: Positive for arthralgias  Negative for myalgias  Skin: Negative for rash and wound  Neurological: Negative for dizziness, numbness and headaches  Hematological: Does not bruise/bleed easily  Psychiatric/Behavioral: Negative for confusion and sleep disturbance  The patient is not nervous/anxious  Objective:      /88   Pulse 84   Temp 97 6 °F (36 4 °C)   Ht 5' 1 5\" (1 562 m)   Wt 78 9 kg (174 lb)   LMP 02/28/2019   SpO2 95%   BMI 32 34 kg/m²          Physical Exam  Vitals and nursing note reviewed  Constitutional:       General: She is not in acute distress  Appearance: She is well-developed     HENT:      Head: " Normocephalic and atraumatic  Right Ear: There is impacted cerumen  Left Ear: There is impacted cerumen  Nose: Congestion present  No rhinorrhea  Mouth/Throat:      Mouth: Mucous membranes are moist    Eyes:      Pupils: Pupils are equal, round, and reactive to light  Cardiovascular:      Rate and Rhythm: Normal rate and regular rhythm  Heart sounds: Normal heart sounds  Pulmonary:      Effort: Pulmonary effort is normal       Breath sounds: Normal breath sounds  No wheezing  Abdominal:      General: Bowel sounds are normal       Palpations: Abdomen is soft  Musculoskeletal:         General: No swelling  Right wrist: Normal  No swelling  Right hand: Bony tenderness present  No deformity  Right lower leg: No edema  Left lower leg: No edema  Comments: (+) tenderness R CMC   Skin:     General: Skin is warm  Findings: No rash  Neurological:      General: No focal deficit present  Mental Status: She is alert and oriented to person, place, and time  Psychiatric:         Mood and Affect: Mood and affect normal  Mood is not anxious or depressed  Behavior: Behavior normal            Labs & imaging results reviewed with patient

## 2023-03-30 NOTE — PATIENT INSTRUCTIONS
Core Strengthening Exercises   AMBULATORY CARE:   What you need to know about core strengthening exercises: Your core includes the muscles of your lower back, hip, pelvis, and abdomen  Core strengthening exercises help heal and strengthen these muscles  This helps prevent another injury, and keeps your pelvis, spine, and hips in the correct position  Call your doctor or physical therapist if:   You have sharp or worsening pain during exercise or at rest     You have questions or concerns about your shoulder exercises  Safety tips:  Talk to your healthcare provider before you start an exercise program  A physical therapist can teach you how to do core strengthening exercises safely  Do the exercises on a mat or firm surface  A firm surface will support your spine and prevent low back pain  Do not do these exercises on a bed  Move slowly and smoothly  Avoid fast or jerky motions  Stop if you feel pain  You may feel some discomfort at first, but you should not feel pain  Tell your provider or physical therapist if you have pain while you exercise  Regular exercise will help decrease your discomfort over time  Breathe normally during core exercises  Do not hold your breath  This may cause an increase in blood pressure and prevent muscle strengthening  Your healthcare provider will tell you when to inhale and exhale during the exercise  Begin all of your exercises with abdominal bracing  Abdominal bracing helps warm up your core muscles  You can also practice abdominal bracing throughout the day  Lie on your back with your knees bent and feet flat on the floor  Place your arms in a relaxed position beside your body  Tighten your abdominal muscles  Pull your belly button in and up toward your spine  Hold for 5 seconds  Relax your muscles  Repeat 10 times  Core strengthening exercises: Your healthcare provider will tell you how often to do these exercises   The provider will also tell you how many repetitions of each exercise you should do  Hold each exercise for 5 seconds or as directed  As you get stronger, increase your hold to 10 to 15 seconds  You can do some of these exercises on a stability ball, or with a weight  Ask your healthcare provider how to use a stability ball or weight for these exercises:  Bridging:  Lie on your back with your knees bent and feet flat on the floor  Rest your arms at your side  Tighten your buttocks, and then lift your hips 1 inch off the floor  Hold for 5 seconds  When you can do this exercise without pain for 10 seconds, increase the distance you lift your hips  A good goal is to be able to lift your hips so that your shoulders, hips, and knees are in a straight line  Dead bug:  Lie on your back with your knees bent and feet flat on the floor  Place your arms in a relaxed position beside your body  Begin with abdominal bracing  Next, raise one leg, keeping your knee bent  Hold for 5 seconds  Repeat with the other leg  When you can do this exercise without pain for 10 to 15 seconds, you may raise one straight leg and hold  Repeat with the other leg  Quadruped:  Place your hands and knees on the floor  Keep your wrists directly below your shoulders and your knees directly below your hips  Pull your belly button in toward your spine  Do not flatten or arch your back  Tighten your abdominal muscles below your belly button  Hold for 5 seconds  When you can do this exercise without pain for 10 to 15 seconds, you may extend one arm and hold  Repeat on the other side  Side bridge exercises:      Standing side bridge:  Stand next to a wall and extend one arm toward the wall  Place your palm flat on the wall with your fingers pointing upward  Begin with abdominal bracing  Next, without moving your feet, slowly bend your arm to 90 degrees  Hold for 5 seconds  Repeat on the other side   When you can do this exercise without pain for 10 to 15 seconds, you may do the bent leg side bridge on the floor  Bent leg side bridge:  Lie on one side with your legs, hips, and shoulders in a straight line  Prop yourself up onto your forearm so your elbow is directly below your shoulder  Bend your knees back to 90 degrees  Begin with abdominal bracing  Next, lift your hips and balance yourself on your forearm and knees  Hold for 5 seconds  Repeat on the other side  When you can do this exercise without pain for 10 to 15 seconds, you may do the straight leg side bridge on the floor  Straight leg side bridge:  Lie on one side with your legs, hips, and shoulders in a straight line  Prop yourself up onto your forearm so your elbow is directly below your shoulder  Begin with abdominal bracing  Lift your hips off the floor and balance yourself on your forearm and the outside of your flexed foot  Do not let your ankle bend sideways  Hold for 5 seconds  Repeat on the other side  When you can do this exercise without pain for 10 to 15 seconds, ask your healthcare provider for more advanced exercises  Superman:  Lie on your stomach  Extend your arms forward on the floor  Tighten your abdominal muscles and lift your right hand and left leg off the floor  Hold this position  Slowly return to the starting position  Tighten your abdominal muscles and lift your left hand and right leg off the floor  Hold this position  Slowly return to the starting position  Clam:  Lie on your side with your knees bent  Put your bottom arm under your head to keep your neck in line  Put your top hand on your hip to keep your pelvis from moving  Put your heels together, and keep them together during this exercise  Slowly raise your top knee toward the ceiling  Then lower your leg so your knees are together  Repeat this exercise 10 times  Then switch sides and do the exercise 10 times with the other leg           Curl up:  Lie on your back with your knees bent and feet flat on the floor  Place your hands, palms down, underneath your lower back  Next, with your elbows on the floor, lift your shoulders and chest 2 to 3 inches off the floor  Keep your head in line with your shoulders  Hold this position  Slowly return to the starting position  Straight leg raises:  Lie on your back with one leg straight  Bend the other knee and place your foot flat on the floor  Tighten your abdominal muscles  Keep your leg straight and slowly lift it straight up 6 to 12 inches off the floor  Hold this position  Lower your leg slowly  Do as many repetitions as directed on this side  Repeat with the other leg  Plank:  Lie on your stomach  Bend your elbows and place your forearms flat on the floor  Lift your chest, stomach, and knees off the floor  Make sure your elbows are below your shoulders  Your body should be in a straight line  Do not let your hips or lower back sink to the ground  Squeeze your abdominal muscles together and hold for 15 seconds  To make this exercise harder, hold for 30 seconds or lift 1 leg at a time  Bicycles:  Lie on your back  Bend both knees and bring them toward your chest  Your calves should be parallel to the floor  Place the palms of your hands on the back of your head  Straighten your right leg and keep it lifted 2 inches off the floor  Raise your head and shoulders off the floor and twist towards your left  Keep your head and shoulders lifted  Bend your right knee while you straighten your left leg  Keep your left leg 2 inches off the floor  Twist your head and chest towards the left leg  Continue to straighten 1 leg at a time and twist        Follow up with your doctor or physical therapist as directed:  Write down your questions so you remember to ask them during your visits  Diet for Stomach Ulcers and Gastritis   WHAT YOU NEED TO KNOW:   A diet for stomach ulcers and gastritis is a meal plan that limits foods that irritate your stomach   Certain foods may worsen symptoms such as stomach pain, bloating, heartburn, or indigestion  Foods to limit or avoid:  You may need to avoid acidic, spicy, or high-fat foods  Not all foods affect everyone the same way  You will need to learn which foods worsen your symptoms and limit those foods  The following are some foods that may worsen ulcer or gastritis symptoms:  Beverages:      Whole milk and chocolate milk    Hot cocoa and cola    Any beverage with caffeine    Regular and decaffeinated coffee    Peppermint and spearmint tea    Green and black tea, with or without caffeine    Orange and grapefruit juices    Drinks that contain alcohol    Spices and seasonings:      Black and red pepper    Chili powder    Mustard seed and nutmeg    Other foods:      Dairy foods made from whole milk or cream    Chocolate    Spicy or strongly flavored cheeses, such as jalapeno or black pepper    Highly seasoned, high-fat meats, such as sausage, salami, lopes, ham, and cold cuts    Hot chiles and peppers    Tomato products, such as tomato paste, tomato sauce, or tomato juice    Foods to include:  Eat a variety of healthy foods from all the food groups  Eat fruits, vegetables, whole grains, and fat-free or low-fat dairy foods  Whole grains include whole-wheat breads, cereals, pasta, and brown rice  Choose lean meats, poultry (chicken and turkey), fish, beans, eggs, and nuts  A healthy meal plan is low in unhealthy fats, salt, and added sugar  Healthy fats include olive oil and canola oil  Ask your dietitian for more information about a healthy diet  Other helpful guidelines:   Do not eat right before bedtime  Stop eating at least 2 hours before bedtime  Eat small, frequent meals  Your stomach may tolerate small, frequent meals better than large meals  © Copyright [employer] 2022 Information is for End User's use only and may not be sold, redistributed or otherwise used for commercial purposes    The above information is an  only  It is not intended as medical advice for individual conditions or treatments  Talk to your doctor, nurse or pharmacist before following any medical regimen to see if it is safe and effective for you

## 2023-04-20 ENCOUNTER — APPOINTMENT (OUTPATIENT)
Dept: RADIOLOGY | Facility: MEDICAL CENTER | Age: 63
End: 2023-04-20

## 2023-04-20 DIAGNOSIS — M79.641 RIGHT HAND PAIN: ICD-10-CM

## 2023-04-26 DIAGNOSIS — R23.2 HOT FLASHES: ICD-10-CM

## 2023-04-26 RX ORDER — PAROXETINE 10 MG/1
TABLET, FILM COATED ORAL
Qty: 90 TABLET | Refills: 0 | Status: SHIPPED | OUTPATIENT
Start: 2023-04-26

## 2023-05-30 ENCOUNTER — CLINICAL SUPPORT (OUTPATIENT)
Dept: INTERNAL MEDICINE CLINIC | Facility: CLINIC | Age: 63
End: 2023-05-30

## 2023-05-30 DIAGNOSIS — Z23 ENCOUNTER FOR IMMUNIZATION: Primary | ICD-10-CM

## 2023-05-30 DIAGNOSIS — E66.09 CLASS 1 OBESITY DUE TO EXCESS CALORIES WITHOUT SERIOUS COMORBIDITY WITH BODY MASS INDEX (BMI) OF 30.0 TO 30.9 IN ADULT: ICD-10-CM

## 2023-05-31 RX ORDER — PHENTERMINE HYDROCHLORIDE 37.5 MG/1
18.75 TABLET ORAL 2 TIMES DAILY
Qty: 30 TABLET | Refills: 1 | Status: SHIPPED | OUTPATIENT
Start: 2023-05-31

## 2023-07-07 DIAGNOSIS — E66.09 CLASS 1 OBESITY DUE TO EXCESS CALORIES WITHOUT SERIOUS COMORBIDITY WITH BODY MASS INDEX (BMI) OF 30.0 TO 30.9 IN ADULT: ICD-10-CM

## 2023-07-07 DIAGNOSIS — R23.2 HOT FLASHES: ICD-10-CM

## 2023-07-07 DIAGNOSIS — I10 ESSENTIAL HYPERTENSION: ICD-10-CM

## 2023-07-07 RX ORDER — PAROXETINE 10 MG/1
TABLET, FILM COATED ORAL
Qty: 90 TABLET | Refills: 0 | Status: SHIPPED | OUTPATIENT
Start: 2023-07-07

## 2023-07-07 RX ORDER — LISINOPRIL 10 MG/1
TABLET ORAL
Qty: 90 TABLET | Refills: 0 | Status: SHIPPED | OUTPATIENT
Start: 2023-07-07

## 2023-07-07 RX ORDER — TOPIRAMATE 50 MG/1
TABLET, FILM COATED ORAL
Qty: 90 TABLET | Refills: 0 | Status: SHIPPED | OUTPATIENT
Start: 2023-07-07

## 2023-08-30 DIAGNOSIS — E66.09 CLASS 1 OBESITY DUE TO EXCESS CALORIES WITHOUT SERIOUS COMORBIDITY WITH BODY MASS INDEX (BMI) OF 30.0 TO 30.9 IN ADULT: ICD-10-CM

## 2023-08-30 RX ORDER — PHENTERMINE HYDROCHLORIDE 37.5 MG/1
18.75 TABLET ORAL 2 TIMES DAILY
Qty: 30 TABLET | Refills: 1 | Status: SHIPPED | OUTPATIENT
Start: 2023-08-30

## 2023-09-26 DIAGNOSIS — E78.2 MIXED HYPERLIPIDEMIA: ICD-10-CM

## 2023-09-26 RX ORDER — ATORVASTATIN CALCIUM 40 MG/1
40 TABLET, FILM COATED ORAL DAILY
Qty: 90 TABLET | Refills: 1 | Status: SHIPPED | OUTPATIENT
Start: 2023-09-26

## 2023-10-23 DIAGNOSIS — R23.2 HOT FLASHES: ICD-10-CM

## 2023-10-23 RX ORDER — PAROXETINE 10 MG/1
TABLET, FILM COATED ORAL
Qty: 90 TABLET | Refills: 0 | Status: SHIPPED | OUTPATIENT
Start: 2023-10-23

## 2023-10-24 DIAGNOSIS — I10 ESSENTIAL HYPERTENSION: ICD-10-CM

## 2023-10-24 RX ORDER — LISINOPRIL 10 MG/1
TABLET ORAL
Qty: 90 TABLET | Refills: 1 | Status: SHIPPED | OUTPATIENT
Start: 2023-10-24

## 2023-12-08 DIAGNOSIS — E66.09 CLASS 1 OBESITY DUE TO EXCESS CALORIES WITHOUT SERIOUS COMORBIDITY WITH BODY MASS INDEX (BMI) OF 30.0 TO 30.9 IN ADULT: ICD-10-CM

## 2023-12-08 RX ORDER — TOPIRAMATE 50 MG/1
TABLET, FILM COATED ORAL
Qty: 90 TABLET | Refills: 0 | Status: SHIPPED | OUTPATIENT
Start: 2023-12-08

## 2023-12-29 ENCOUNTER — TELEPHONE (OUTPATIENT)
Age: 63
End: 2023-12-29

## 2023-12-29 DIAGNOSIS — K62.5 RECTAL BLEEDING: Primary | ICD-10-CM

## 2023-12-29 NOTE — TELEPHONE ENCOUNTER
Patient called in requesting Colonoscopy doctor referral from Dr. Kim. She mentioned having little pain and bleeding. Patient had her last colonoscopy at the age of 50 yrs which was done at [facility] by . Please advise the patient by an return call once the referral is issued by Dr. Kim. Thanks.

## 2024-01-04 ENCOUNTER — TELEPHONE (OUTPATIENT)
Age: 64
End: 2024-01-04

## 2024-01-04 NOTE — TELEPHONE ENCOUNTER
Pt calling stating she received a text for sooner appt but she is not tech angie. I checked for sooner appt and none available. Pt stated her current appt will be fine and thank you.

## 2024-01-19 DIAGNOSIS — E66.09 CLASS 1 OBESITY DUE TO EXCESS CALORIES WITHOUT SERIOUS COMORBIDITY WITH BODY MASS INDEX (BMI) OF 30.0 TO 30.9 IN ADULT: ICD-10-CM

## 2024-01-19 DIAGNOSIS — R23.2 HOT FLASHES: ICD-10-CM

## 2024-01-19 RX ORDER — PAROXETINE 10 MG/1
TABLET, FILM COATED ORAL
Qty: 90 TABLET | Refills: 1 | Status: SHIPPED | OUTPATIENT
Start: 2024-01-19

## 2024-01-19 RX ORDER — TOPIRAMATE 50 MG/1
TABLET, FILM COATED ORAL
Qty: 90 TABLET | Refills: 1 | Status: SHIPPED | OUTPATIENT
Start: 2024-01-19

## 2024-02-07 ENCOUNTER — OFFICE VISIT (OUTPATIENT)
Dept: GASTROENTEROLOGY | Facility: AMBULARY SURGERY CENTER | Age: 64
End: 2024-02-07
Payer: COMMERCIAL

## 2024-02-07 VITALS
WEIGHT: 147 LBS | SYSTOLIC BLOOD PRESSURE: 116 MMHG | HEIGHT: 61 IN | HEART RATE: 65 BPM | DIASTOLIC BLOOD PRESSURE: 70 MMHG | BODY MASS INDEX: 27.75 KG/M2 | OXYGEN SATURATION: 100 %

## 2024-02-07 DIAGNOSIS — Z12.11 SCREEN FOR COLON CANCER: ICD-10-CM

## 2024-02-07 DIAGNOSIS — K62.5 RECTAL BLEEDING: Primary | ICD-10-CM

## 2024-02-07 DIAGNOSIS — K64.8 INTERNAL HEMORRHOIDS: ICD-10-CM

## 2024-02-07 DIAGNOSIS — K21.9 GERD WITHOUT ESOPHAGITIS: ICD-10-CM

## 2024-02-07 PROCEDURE — 99213 OFFICE O/P EST LOW 20 MIN: CPT | Performed by: PHYSICIAN ASSISTANT

## 2024-02-07 NOTE — PROGRESS NOTES
[facility] - New Patient Office Visit  Mary Kitchen 64 y.o. female MRN: [id number]  Encounter: [id number]          ASSESSMENT AND PLAN:      1. Screen colon  - Plan screening colonoscopy  - This risks of this procedure include but are not limited to; anesthesia complications, injury/perforation of the bowel, infection and bleeding.    2. Internal hemorrhoids  -Would recommend Anusol twice daily for 10 days    3. GERD without esophagitis  - Patient is currently stable on rabeprazole 20 mg daily.   No current daytime or night time break through symptoms.  No pain or difficulty swallowing.  No unintentional weight loss.      ______________________________________________________________________    Chief Complaint: Bleeding    HPI: This is a 64-year-old white female new to me last seen in the office in 2016 for GERD.  Past medical history includes hyperlipidemia, hypertension, vitamin D deficiency, GERD, hiatal hernia, diverticulosis, internal hemorrhoids and seasonal allergies.  She is here for chief concern of rectal bleeding.  She has known internal hemorrhoids.  Last colonoscopy with Dr. Keating around 2014    No pacemaker or defibrillator implanted.   No chronic kidney disease or solitary kidney.  Not on any supplemental oxygen at night.  Not on any antiplatelet or anticoagulants.      Endoscopy History:  EGD -   Colonoscopy -around 2014 with Dr. Keating    REVIEW OF SYSTEMS:    CONSTITUTIONAL: Denies any fever, chills, rigors, and weight loss.  HEENT: Denies hearing loss or visual disturbances.  CARDIOVASCULAR: No chest pain or palpitations.   RESPIRATORY: Denies any cough, hemoptysis, shortness of breath or dyspnea on exertion.  GASTROINTESTINAL: As noted in the History of Present Illness.   GENITOURINARY: No problems with urination. Denies any hematuria or dysuria.  NEUROLOGIC: No dizziness or vertigo, denies headaches.   MUSCULOSKELETAL: Denies any muscle or joint pain.   SKIN:  Denies skin rashes or itching.   ENDOCRINE: Denies excessive thirst. Denies intolerance to heat or cold.  PSYCHOSOCIAL: Denies depression or anxiety. Denies any recent memory loss.       Historical Information   Past Medical History:   Diagnosis Date   • GERD (gastroesophageal reflux disease)    • Hyperlipidemia    • Hypertension    • Hypokalemia 2015   • Obesity    • PONV (postoperative nausea and vomiting)    • Varicose veins of both lower extremities      Past Surgical History:   Procedure Laterality Date   • BREAST BIOPSY Left 11/15/2011    Ultrasound guided biopsy   • BREAST BIOPSY Left 2011    Ultrasound guided biopsy   • BREAST SURGERY      reduction   •  SECTION     • COLONOSCOPY     • HEMORROIDECTOMY     • AL EGD TRANSORAL BIOPSY SINGLE/MULTIPLE N/A 2016    Procedure: ESOPHAGOGASTRODUODENOSCOPY (EGD);  Surgeon: MARIANA Keating MD;  Location: [facility];  Service: General   • AL ENDOVEN ABLTJ INCMPTNT VEIN XTR LASER 1ST VEIN Left 2022    Procedure: ENDOVASCULAR LASER THERAPY (EVLT) LEFT GREATER SAPHENOUS VEIN WITH MULTIPLE STAB PHLEBECTOMIES;  Surgeon: Taryn Klein MD;  Location: [facility];  Service: Vascular   • AL STAB PHLEBT VARICOSE VEINS 1 XTR > 20 INCS Left 2022    Procedure: MULTIPLE STAB PHLEBECTOMIES;  Surgeon: Taryn Klein MD;  Location: [facility] MAIN OR;  Service: Vascular   • REDUCTION MAMMAPLASTY Bilateral 2015   • TUBAL LIGATION       Social History   Social History     Substance and Sexual Activity   Alcohol Use Yes    Comment: social - minimum alcohol  consumption per [employer]     Social History     Substance and Sexual Activity   Drug Use No     Social History     Tobacco Use   Smoking Status Never   Smokeless Tobacco Never   Tobacco Comments    Never a smoker  per [employer]     Family History   Problem Relation Age of Onset   • Heart disease Mother         Coronary Artery Bypass Graft   • Hyperlipidemia Mother    • Kidney failure Mother    •  "Heart failure Mother    • Hypertension Father    • Hyperlipidemia Brother    • No Known Problems Sister    • No Known Problems Daughter    • Liver cancer Maternal Grandmother    • No Known Problems Maternal Grandfather    • No Known Problems Paternal Grandmother    • No Known Problems Paternal Grandfather    • No Known Problems Daughter    • Dementia Maternal Aunt    • Alcohol abuse Neg Hx    • Depression Neg Hx    • Drug abuse Neg Hx    • Substance Abuse Neg Hx    • Mental illness Neg Hx        Meds/Allergies       Current Outpatient Medications:   •  Acetaminophen (Tylenol) 325 MG CAPS  •  Ascorbic Acid (VITAMIN C PO)  •  atorvastatin (LIPITOR) 40 mg tablet  •  fluticasone (FLONASE) 50 mcg/act nasal spray  •  ibuprofen (MOTRIN) 200 mg tablet  •  lisinopril (ZESTRIL) 10 mg tablet  •  PARoxetine (PAXIL) 10 mg tablet  •  phentermine (ADIPEX-P) 37.5 MG tablet  •  RABEprazole (ACIPHEX) 20 MG tablet  •  topiramate (TOPAMAX) 50 MG tablet  •  VITAMIN D PO    No Known Allergies        Objective     Blood pressure 116/70, pulse 65, height 5' 1\" (1.549 m), weight 66.7 kg (147 lb), last menstrual period 02/28/2019, SpO2 100%. Body mass index is 27.78 kg/m².        PHYSICAL EXAM:      General Appearance:   Alert, cooperative, no distress, appears stated age   HEENT:   Normocephalic, atraumatic, anicteric.     Neck:  Supple, symmetrical   Lungs:   Clear to auscultation bilaterally; no rales, rhonchi or wheezing; respirations unlabored    Heart::   Regular rate and rhythm; no murmur, rub, or gallop.   Abdomen:   Soft, non-tender, non-distended; normal bowel sounds; no masses, no organomegaly    Genitalia:   Deferred    Rectal:   Deferred    Extremities:  No cyanosis, clubbing or edema    Pulses:  Not assessed   Skin:  No jaundice, rashes, or lesions    Lymph nodes:  Not assessed       Lab Results:   No visits with results within 1 Day(s) from this visit.   Latest known visit with results is:   Refill on 03/06/2023   Component " Date Value   • WBC 03/27/2023 5.86    • RBC 03/27/2023 5.10    • Hemoglobin 03/27/2023 14.6    • Hematocrit 03/27/2023 46.2 (H)    • MCV 03/27/2023 91    • MCH 03/27/2023 28.6    • MCHC 03/27/2023 31.6    • RDW 03/27/2023 13.2    • MPV 03/27/2023 10.2    • Platelets 03/27/2023 335    • nRBC 03/27/2023 0    • Neutrophils Relative 03/27/2023 64    • Immat GRANS % 03/27/2023 0    • Lymphocytes Relative 03/27/2023 24    • Monocytes Relative 03/27/2023 8    • Eosinophils Relative 03/27/2023 3    • Basophils Relative 03/27/2023 1    • Neutrophils Absolute 03/27/2023 3.73    • Immature Grans Absolute 03/27/2023 0.01    • Lymphocytes Absolute 03/27/2023 1.40    • Monocytes Absolute 03/27/2023 0.47    • Eosinophils Absolute 03/27/2023 0.18    • Basophils Absolute 03/27/2023 0.07    • Sodium 03/27/2023 138    • Potassium 03/27/2023 4.1    • Chloride 03/27/2023 108    • CO2 03/27/2023 27    • ANION GAP 03/27/2023 3 (L)    • BUN 03/27/2023 22    • Creatinine 03/27/2023 0.74    • Glucose, Fasting 03/27/2023 92    • Calcium 03/27/2023 9.8    • AST 03/27/2023 20    • ALT 03/27/2023 31    • Alkaline Phosphatase 03/27/2023 74    • Total Protein 03/27/2023 7.6    • Albumin 03/27/2023 3.9    • Total Bilirubin 03/27/2023 0.43    • eGFR 03/27/2023 86    • Hemoglobin A1C 03/27/2023 6.0 (H)    • EAG 03/27/2023 126    • Cholesterol 03/27/2023 194    • Triglycerides 03/27/2023 109    • HDL, Direct 03/27/2023 69    • LDL Calculated 03/27/2023 103 (H)    • Non-HDL-Chol (CHOL-HDL) 03/27/2023 125    • TSH 3RD GENERATON 03/27/2023 0.801    • Vitamin B-12 03/27/2023 736    • Vit D, 25-Hydroxy 03/27/2023 39.6          Radiology Results:   No results found.    Israel Stern PA-C

## 2024-02-07 NOTE — PATIENT INSTRUCTIONS
Scheduled date of EGD/colonoscopy (as of today): February 27, 2024  Physician performing EGD/colonoscopy: Dr. Archibald  Location of EGD/colonoscopy: [facility]  Desired bowel prep reviewed with patient: miralax/dulcolax  Instructions reviewed with patient by: Taina QUINTANILLA  Clearances:  n/a

## 2024-02-07 NOTE — LETTER
February 7, 2024     Salina Kim MD  [address] [facility]  [address] [place] [id number]    Patient: Mary Kitchen   YOB: 1960   Date of Visit: 2/7/2024       Dear Dr. Kim:    Thank you for referring Mary Kitchen to me for evaluation. Below are my notes for this consultation.    If you have questions, please do not hesitate to call me. I look forward to following your patient along with you.         Sincerely,        Israel Stern PA-C        CC: No Recipients    Israel Stern PA-C  2/7/2024  9:16 AM  Incomplete  [facility] - New Patient Office Visit  aMry Kitchen 64 y.o. female MRN: [id number]  Encounter: [id number]          ASSESSMENT AND PLAN:      1. Screen colon  - Plan screening colonoscopy  - This risks of this procedure include but are not limited to; anesthesia complications, injury/perforation of the bowel, infection and bleeding.    2. Internal hemorrhoids  -Would recommend Anusol twice daily for 10 days    3. GERD without esophagitis  - Patient is currently stable on rabeprazole 20 mg daily.   No current daytime or night time break through symptoms.  No pain or difficulty swallowing.  No unintentional weight loss.      ______________________________________________________________________    Chief Complaint: Bleeding    HPI: This is a 64-year-old white female new to me last seen in the office in 2016 for GERD.  Past medical history includes hyperlipidemia, hypertension, vitamin D deficiency, GERD, hiatal hernia, diverticulosis, internal hemorrhoids and seasonal allergies.  She is here for chief concern of rectal bleeding.  She has known internal hemorrhoids.  Last colonoscopy with Dr. Keating around 2014    No pacemaker or defibrillator implanted.   No chronic kidney disease or solitary kidney.  Not on any supplemental oxygen at night.  Not on any antiplatelet or anticoagulants.      Endoscopy History:  EGD -    Colonoscopy -around  with Dr. Keating    REVIEW OF SYSTEMS:    CONSTITUTIONAL: Denies any fever, chills, rigors, and weight loss.  HEENT: Denies hearing loss or visual disturbances.  CARDIOVASCULAR: No chest pain or palpitations.   RESPIRATORY: Denies any cough, hemoptysis, shortness of breath or dyspnea on exertion.  GASTROINTESTINAL: As noted in the History of Present Illness.   GENITOURINARY: No problems with urination. Denies any hematuria or dysuria.  NEUROLOGIC: No dizziness or vertigo, denies headaches.   MUSCULOSKELETAL: Denies any muscle or joint pain.   SKIN: Denies skin rashes or itching.   ENDOCRINE: Denies excessive thirst. Denies intolerance to heat or cold.  PSYCHOSOCIAL: Denies depression or anxiety. Denies any recent memory loss.       Historical Information  Past Medical History:   Diagnosis Date   • GERD (gastroesophageal reflux disease)    • Hyperlipidemia    • Hypertension    • Hypokalemia 2015   • Obesity    • PONV (postoperative nausea and vomiting)    • Varicose veins of both lower extremities      Past Surgical History:   Procedure Laterality Date   • BREAST BIOPSY Left 11/15/2011    Ultrasound guided biopsy   • BREAST BIOPSY Left 2011    Ultrasound guided biopsy   • BREAST SURGERY      reduction   •  SECTION     • COLONOSCOPY     • HEMORROIDECTOMY     • IN EGD TRANSORAL BIOPSY SINGLE/MULTIPLE N/A 2016    Procedure: ESOPHAGOGASTRODUODENOSCOPY (EGD);  Surgeon: MARIANA Keating MD;  [facility];  Service: General   • IN ENDOVEN ABLTJ INCMPTNT VEIN XTR LASER 1ST VEIN Left 2022    Procedure: ENDOVASCULAR LASER THERAPY (EVLT) LEFT GREATER SAPHENOUS VEIN WITH MULTIPLE STAB PHLEBECTOMIES;  Surgeon: Taryn Klein MD;  Location[facility] MAIN OR;  Service: Vascular   • IN STAB PHLEBT VARICOSE VEINS 1 XTR > 20 INCS Left 2022    Procedure: MULTIPLE STAB PHLEBECTOMIES;  Surgeon: Taryn Klein MD;  Location[facility]IN OR;  Service: Vascular   •  "REDUCTION MAMMAPLASTY Bilateral 01/05/2015   • TUBAL LIGATION       Social History  Social History     Substance and Sexual Activity   Alcohol Use Yes    Comment: social - minimum alcohol  consumption per [employer]     Social History     Substance and Sexual Activity   Drug Use No     Social History     Tobacco Use   Smoking Status Never   Smokeless Tobacco Never   Tobacco Comments    Never a smoker  per [employer]     Family History   Problem Relation Age of Onset   • Heart disease Mother         Coronary Artery Bypass Graft   • Hyperlipidemia Mother    • Kidney failure Mother    • Heart failure Mother    • Hypertension Father    • Hyperlipidemia Brother    • No Known Problems Sister    • No Known Problems Daughter    • Liver cancer Maternal Grandmother    • No Known Problems Maternal Grandfather    • No Known Problems Paternal Grandmother    • No Known Problems Paternal Grandfather    • No Known Problems Daughter    • Dementia Maternal Aunt    • Alcohol abuse Neg Hx    • Depression Neg Hx    • Drug abuse Neg Hx    • Substance Abuse Neg Hx    • Mental illness Neg Hx        Meds/Allergies      Current Outpatient Medications:   •  Acetaminophen (Tylenol) 325 MG CAPS  •  Ascorbic Acid (VITAMIN C PO)  •  atorvastatin (LIPITOR) 40 mg tablet  •  fluticasone (FLONASE) 50 mcg/act nasal spray  •  ibuprofen (MOTRIN) 200 mg tablet  •  lisinopril (ZESTRIL) 10 mg tablet  •  PARoxetine (PAXIL) 10 mg tablet  •  phentermine (ADIPEX-P) 37.5 MG tablet  •  RABEprazole (ACIPHEX) 20 MG tablet  •  topiramate (TOPAMAX) 50 MG tablet  •  VITAMIN D PO    No Known Allergies        Objective    Blood pressure 116/70, pulse 65, height 5' 1\" (1.549 m), weight 66.7 kg (147 lb), last menstrual period 02/28/2019, SpO2 100%. Body mass index is 27.78 kg/m².        PHYSICAL EXAM:      General Appearance:   Alert, cooperative, no distress, appears stated age   HEENT:   Normocephalic, atraumatic, anicteric.     Neck:  Supple, symmetrical   Lungs:   " Clear to auscultation bilaterally; no rales, rhonchi or wheezing; respirations unlabored    Heart::   Regular rate and rhythm; no murmur, rub, or gallop.   Abdomen:   Soft, non-tender, non-distended; normal bowel sounds; no masses, no organomegaly    Genitalia:   Deferred    Rectal:   Deferred    Extremities:  No cyanosis, clubbing or edema    Pulses:  Not assessed   Skin:  No jaundice, rashes, or lesions    Lymph nodes:  Not assessed       Lab Results:   No visits with results within 1 Day(s) from this visit.   Latest known visit with results is:   Refill on 03/06/2023   Component Date Value   • WBC 03/27/2023 5.86    • RBC 03/27/2023 5.10    • Hemoglobin 03/27/2023 14.6    • Hematocrit 03/27/2023 46.2 (H)    • MCV 03/27/2023 91    • MCH 03/27/2023 28.6    • MCHC 03/27/2023 31.6    • RDW 03/27/2023 13.2    • MPV 03/27/2023 10.2    • Platelets 03/27/2023 335    • nRBC 03/27/2023 0    • Neutrophils Relative 03/27/2023 64    • Immat GRANS % 03/27/2023 0    • Lymphocytes Relative 03/27/2023 24    • Monocytes Relative 03/27/2023 8    • Eosinophils Relative 03/27/2023 3    • Basophils Relative 03/27/2023 1    • Neutrophils Absolute 03/27/2023 3.73    • Immature Grans Absolute 03/27/2023 0.01    • Lymphocytes Absolute 03/27/2023 1.40    • Monocytes Absolute 03/27/2023 0.47    • Eosinophils Absolute 03/27/2023 0.18    • Basophils Absolute 03/27/2023 0.07    • Sodium 03/27/2023 138    • Potassium 03/27/2023 4.1    • Chloride 03/27/2023 108    • CO2 03/27/2023 27    • ANION GAP 03/27/2023 3 (L)    • BUN 03/27/2023 22    • Creatinine 03/27/2023 0.74    • Glucose, Fasting 03/27/2023 92    • Calcium 03/27/2023 9.8    • AST 03/27/2023 20    • ALT 03/27/2023 31    • Alkaline Phosphatase 03/27/2023 74    • Total Protein 03/27/2023 7.6    • Albumin 03/27/2023 3.9    • Total Bilirubin 03/27/2023 0.43    • eGFR 03/27/2023 86    • Hemoglobin A1C 03/27/2023 6.0 (H)    • EAG 03/27/2023 126    • Cholesterol 03/27/2023 194    •  Triglycerides 03/27/2023 109    • HDL, Direct 03/27/2023 69    • LDL Calculated 03/27/2023 103 (H)    • Non-HDL-Chol (CHOL-HDL) 03/27/2023 125    • TSH 3RD GENERATON 03/27/2023 0.801    • Vitamin B-12 03/27/2023 736    • Vit D, 25-Hydroxy 03/27/2023 39.6          Radiology Results:   No results found.    Israel Stern PA-C  2/7/2024  7:58 AM  Sign when Signing Visit  [facility] - New Patient Office Visit  Mary Kitchen 64 y.o. female MRN: [id number]  Encounter: [id number]          ASSESSMENT AND PLAN:      1. Rectal bleeding  -Question outlet bleeding from internal hemorrhoids  -Will plan diagnostic colonoscopy  - This risks of this procedure include but are not limited to; anesthesia complications, injury/perforation of the bowel, infection and bleeding.    2. Internal hemorrhoids  -Would recommend Anusol twice daily for 10 days    3. GERD without esophagitis  - Patient is currently stable on rabeprazole 20 mg daily.   No current daytime or night time break through symptoms.  No pain or difficulty swallowing.  No unintentional weight loss.      ______________________________________________________________________    Chief Complaint: Bleeding    HPI: This is a 64-year-old white female new to me last seen in the office in 2016 for GERD.  Past medical history includes hyperlipidemia, hypertension, vitamin D deficiency, GERD, hiatal hernia, diverticulosis, internal hemorrhoids and seasonal allergies.  She is here for chief concern of rectal bleeding.  She has known internal hemorrhoids.  Last colonoscopy with Dr. Keating around 2014    No pacemaker or defibrillator implanted.   No chronic kidney disease or solitary kidney.  Not on any supplemental oxygen at night.  Not on any antiplatelet or anticoagulants.      Endoscopy History:  EGD -   Colonoscopy -around 2014 with Dr. Keating    REVIEW OF SYSTEMS:    CONSTITUTIONAL: Denies any fever, chills,  rigors, and weight loss.  HEENT: Denies hearing loss or visual disturbances.  CARDIOVASCULAR: No chest pain or palpitations.   RESPIRATORY: Denies any cough, hemoptysis, shortness of breath or dyspnea on exertion.  GASTROINTESTINAL: As noted in the History of Present Illness.   GENITOURINARY: No problems with urination. Denies any hematuria or dysuria.  NEUROLOGIC: No dizziness or vertigo, denies headaches.   MUSCULOSKELETAL: Denies any muscle or joint pain.   SKIN: Denies skin rashes or itching.   ENDOCRINE: Denies excessive thirst. Denies intolerance to heat or cold.  PSYCHOSOCIAL: Denies depression or anxiety. Denies any recent memory loss.       Historical Information  Past Medical History:   Diagnosis Date   • GERD (gastroesophageal reflux disease)    • Hyperlipidemia    • Hypertension    • Hypokalemia 2015   • Obesity    • PONV (postoperative nausea and vomiting)    • Varicose veins of both lower extremities      Past Surgical History:   Procedure Laterality Date   • BREAST BIOPSY Left 11/15/2011    Ultrasound guided biopsy   • BREAST BIOPSY Left 2011    Ultrasound guided biopsy   • BREAST SURGERY      reduction   •  SECTION     • COLONOSCOPY     • HEMORROIDECTOMY     • [place] EGD TRANSORAL BIOPSY SINGLE/MULTIPLE N/A 2016    Procedure: ESOPHAGOGASTRODUODENOSCOPY (EGD);  Surgeon: MARIANA Keating MD;  [facility];  Service: General   • [place] ENDOVEN ABLTJ INCMPTNT VEIN XTR LASER 1ST VEIN Left 2022    Procedure: ENDOVASCULAR LASER THERAPY (EVLT) LEFT GREATER SAPHENOUS VEIN WITH MULTIPLE STAB PHLEBECTOMIES;  Surgeon: Taryn Klein MD;  [facility];  Service: Vascular   • [place] STAB PHLEBT VARICOSE VEINS 1 XTR > 20 INCS Left 2022    Procedure: MULTIPLE STAB PHLEBECTOMIES;  Surgeon: Taryn Klein MD;  [facility];  Service: Vascular   • REDUCTION MAMMAPLASTY Bilateral 2015   • TUBAL LIGATION       Social History  Social History     Substance and  Sexual Activity   Alcohol Use Yes    Comment: social - minimum alcohol  consumption per [employer]     Social History     Substance and Sexual Activity   Drug Use No     Social History     Tobacco Use   Smoking Status Never   Smokeless Tobacco Never   Tobacco Comments    Never a smoker  per [employer]     Family History   Problem Relation Age of Onset   • Heart disease Mother         Coronary Artery Bypass Graft   • Hyperlipidemia Mother    • Kidney failure Mother    • Heart failure Mother    • Hypertension Father    • Hyperlipidemia Brother    • No Known Problems Sister    • No Known Problems Daughter    • Liver cancer Maternal Grandmother    • No Known Problems Maternal Grandfather    • No Known Problems Paternal Grandmother    • No Known Problems Paternal Grandfather    • No Known Problems Daughter    • Dementia Maternal Aunt    • Alcohol abuse Neg Hx    • Depression Neg Hx    • Drug abuse Neg Hx    • Substance Abuse Neg Hx    • Mental illness Neg Hx        Meds/Allergies      Current Outpatient Medications:   •  Acetaminophen (Tylenol) 325 MG CAPS  •  Ascorbic Acid (VITAMIN C PO)  •  atorvastatin (LIPITOR) 40 mg tablet  •  fluticasone (FLONASE) 50 mcg/act nasal spray  •  ibuprofen (MOTRIN) 200 mg tablet  •  lisinopril (ZESTRIL) 10 mg tablet  •  PARoxetine (PAXIL) 10 mg tablet  •  phentermine (ADIPEX-P) 37.5 MG tablet  •  RABEprazole (ACIPHEX) 20 MG tablet  •  topiramate (TOPAMAX) 50 MG tablet  •  VITAMIN D PO    No Known Allergies        Objective    Last menstrual period 02/28/2019. There is no height or weight on file to calculate BMI.        PHYSICAL EXAM:      General Appearance:   Alert, cooperative, no distress, appears stated age   HEENT:   Normocephalic, atraumatic, anicteric.     Neck:  Supple, symmetrical   Lungs:   Clear to auscultation bilaterally; no rales, rhonchi or wheezing; respirations unlabored    Heart::   Regular rate and rhythm; no murmur, rub, or gallop.   Abdomen:   Soft, non-tender,  non-distended; normal bowel sounds; no masses, no organomegaly    Genitalia:   Deferred    Rectal:   Deferred    Extremities:  No cyanosis, clubbing or edema    Pulses:  Not assessed   Skin:  No jaundice, rashes, or lesions    Lymph nodes:  Not assessed       Lab Results:   No visits with results within 1 Day(s) from this visit.   Latest known visit with results is:   Refill on 03/06/2023   Component Date Value   • WBC 03/27/2023 5.86    • RBC 03/27/2023 5.10    • Hemoglobin 03/27/2023 14.6    • Hematocrit 03/27/2023 46.2 (H)    • MCV 03/27/2023 91    • MCH 03/27/2023 28.6    • MCHC 03/27/2023 31.6    • RDW 03/27/2023 13.2    • MPV 03/27/2023 10.2    • Platelets 03/27/2023 335    • nRBC 03/27/2023 0    • Neutrophils Relative 03/27/2023 64    • Immat GRANS % 03/27/2023 0    • Lymphocytes Relative 03/27/2023 24    • Monocytes Relative 03/27/2023 8    • Eosinophils Relative 03/27/2023 3    • Basophils Relative 03/27/2023 1    • Neutrophils Absolute 03/27/2023 3.73    • Immature Grans Absolute 03/27/2023 0.01    • Lymphocytes Absolute 03/27/2023 1.40    • Monocytes Absolute 03/27/2023 0.47    • Eosinophils Absolute 03/27/2023 0.18    • Basophils Absolute 03/27/2023 0.07    • Sodium 03/27/2023 138    • Potassium 03/27/2023 4.1    • Chloride 03/27/2023 108    • CO2 03/27/2023 27    • ANION GAP 03/27/2023 3 (L)    • BUN 03/27/2023 22    • Creatinine 03/27/2023 0.74    • Glucose, Fasting 03/27/2023 92    • Calcium 03/27/2023 9.8    • AST 03/27/2023 20    • ALT 03/27/2023 31    • Alkaline Phosphatase 03/27/2023 74    • Total Protein 03/27/2023 7.6    • Albumin 03/27/2023 3.9    • Total Bilirubin 03/27/2023 0.43    • eGFR 03/27/2023 86    • Hemoglobin A1C 03/27/2023 6.0 (H)    • EAG 03/27/2023 126    • Cholesterol 03/27/2023 194    • Triglycerides 03/27/2023 109    • HDL, Direct 03/27/2023 69    • LDL Calculated 03/27/2023 103 (H)    • Non-HDL-Chol (CHOL-HDL) 03/27/2023 125    • TSH 3RD GENERATON 03/27/2023 0.801    • Vitamin  B-12 03/27/2023 736    • Vit D, 25-Hydroxy 03/27/2023 39.6          Radiology Results:   No results found.    Israel Stern PA-C

## 2024-02-07 NOTE — H&P (VIEW-ONLY)
[facility] - New Patient Office Visit  Mary Kitchen 64 y.o. female MRN: [id number]  Encounter: [id number]          ASSESSMENT AND PLAN:      1. Screen colon  - Plan screening colonoscopy  - This risks of this procedure include but are not limited to; anesthesia complications, injury/perforation of the bowel, infection and bleeding.    2. Internal hemorrhoids  -Would recommend Anusol twice daily for 10 days    3. GERD without esophagitis  - Patient is currently stable on rabeprazole 20 mg daily.   No current daytime or night time break through symptoms.  No pain or difficulty swallowing.  No unintentional weight loss.      ______________________________________________________________________    Chief Complaint: Bleeding    HPI: This is a 64-year-old white female new to me last seen in the office in 2016 for GERD.  Past medical history includes hyperlipidemia, hypertension, vitamin D deficiency, GERD, hiatal hernia, diverticulosis, internal hemorrhoids and seasonal allergies.  She is here for chief concern of rectal bleeding.  She has known internal hemorrhoids.  Last colonoscopy with Dr. Keating around 2014    No pacemaker or defibrillator implanted.   No chronic kidney disease or solitary kidney.  Not on any supplemental oxygen at night.  Not on any antiplatelet or anticoagulants.      Endoscopy History:  EGD -   Colonoscopy -around 2014 with Dr. Keating    REVIEW OF SYSTEMS:    CONSTITUTIONAL: Denies any fever, chills, rigors, and weight loss.  HEENT: Denies hearing loss or visual disturbances.  CARDIOVASCULAR: No chest pain or palpitations.   RESPIRATORY: Denies any cough, hemoptysis, shortness of breath or dyspnea on exertion.  GASTROINTESTINAL: As noted in the History of Present Illness.   GENITOURINARY: No problems with urination. Denies any hematuria or dysuria.  NEUROLOGIC: No dizziness or vertigo, denies headaches.   MUSCULOSKELETAL: Denies any muscle or joint pain.   SKIN:  Denies skin rashes or itching.   ENDOCRINE: Denies excessive thirst. Denies intolerance to heat or cold.  PSYCHOSOCIAL: Denies depression or anxiety. Denies any recent memory loss.       Historical Information   Past Medical History:   Diagnosis Date   • GERD (gastroesophageal reflux disease)    • Hyperlipidemia    • Hypertension    • Hypokalemia 2015   • Obesity    • PONV (postoperative nausea and vomiting)    • Varicose veins of both lower extremities      Past Surgical History:   Procedure Laterality Date   • BREAST BIOPSY Left 11/15/2011    Ultrasound guided biopsy   • BREAST BIOPSY Left 2011    Ultrasound guided biopsy   • BREAST SURGERY      reduction   •  SECTION     • COLONOSCOPY     • HEMORROIDECTOMY     • [place] EGD TRANSORAL BIOPSY SINGLE/MULTIPLE N/A 2016    Procedure: ESOPHAGOGASTRODUODENOSCOPY (EGD);  Surgeon: MARIANA Keating MD;  [facility];  Service: General   • [place] ENDOVEN ABLTJ INCMPTNT VEIN XTR LASER 1ST VEIN Left 2022    Procedure: ENDOVASCULAR LASER THERAPY (EVLT) LEFT GREATER SAPHENOUS VEIN WITH MULTIPLE STAB PHLEBECTOMIES;  Surgeon: Taryn Klein MD;  [facility] MA[facility];  Service: Vascular   • [place] STAB PHLEBT VARICOSE VEINS 1 XTR > 20 INCS Left 2022    Procedure: MULTIPLE STAB PHLEBECTOMIES;  Surgeon: Taryn Klein MD;  [facility];  Service: Vascular   • REDUCTION MAMMAPLASTY Bilateral 2015   • TUBAL LIGATION       Social History   Social History     Substance and Sexual Activity   Alcohol Use Yes    Comment: social - minimum alcohol  consumption per [employer]     Social History     Substance and Sexual Activity   Drug Use No     Social History     Tobacco Use   Smoking Status Never   Smokeless Tobacco Never   Tobacco Comments    Never a smoker  per [employer]     Family History   Problem Relation Age of Onset   • Heart disease Mother         Coronary Artery Bypass Graft   • Hyperlipidemia Mother    • Kidney failure Mother    •  "Heart failure Mother    • Hypertension Father    • Hyperlipidemia Brother    • No Known Problems Sister    • No Known Problems Daughter    • Liver cancer Maternal Grandmother    • No Known Problems Maternal Grandfather    • No Known Problems Paternal Grandmother    • No Known Problems Paternal Grandfather    • No Known Problems Daughter    • Dementia Maternal Aunt    • Alcohol abuse Neg Hx    • Depression Neg Hx    • Drug abuse Neg Hx    • Substance Abuse Neg Hx    • Mental illness Neg Hx        Meds/Allergies       Current Outpatient Medications:   •  Acetaminophen (Tylenol) 325 MG CAPS  •  Ascorbic Acid (VITAMIN C PO)  •  atorvastatin (LIPITOR) 40 mg tablet  •  fluticasone (FLONASE) 50 mcg/act nasal spray  •  ibuprofen (MOTRIN) 200 mg tablet  •  lisinopril (ZESTRIL) 10 mg tablet  •  PARoxetine (PAXIL) 10 mg tablet  •  phentermine (ADIPEX-P) 37.5 MG tablet  •  RABEprazole (ACIPHEX) 20 MG tablet  •  topiramate (TOPAMAX) 50 MG tablet  •  VITAMIN D PO    No Known Allergies        Objective     Blood pressure 116/70, pulse 65, height 5' 1\" (1.549 m), weight 66.7 kg (147 lb), last menstrual period 02/28/2019, SpO2 100%. Body mass index is 27.78 kg/m².        PHYSICAL EXAM:      General Appearance:   Alert, cooperative, no distress, appears stated age   HEENT:   Normocephalic, atraumatic, anicteric.     Neck:  Supple, symmetrical   Lungs:   Clear to auscultation bilaterally; no rales, rhonchi or wheezing; respirations unlabored    Heart::   Regular rate and rhythm; no murmur, rub, or gallop.   Abdomen:   Soft, non-tender, non-distended; normal bowel sounds; no masses, no organomegaly    Genitalia:   Deferred    Rectal:   Deferred    Extremities:  No cyanosis, clubbing or edema    Pulses:  Not assessed   Skin:  No jaundice, rashes, or lesions    Lymph nodes:  Not assessed       Lab Results:   No visits with results within 1 Day(s) from this visit.   Latest known visit with results is:   Refill on 03/06/2023   Component " Date Value   • WBC 03/27/2023 5.86    • RBC 03/27/2023 5.10    • Hemoglobin 03/27/2023 14.6    • Hematocrit 03/27/2023 46.2 (H)    • MCV 03/27/2023 91    • MCH 03/27/2023 28.6    • MCHC 03/27/2023 31.6    • RDW 03/27/2023 13.2    • MPV 03/27/2023 10.2    • Platelets 03/27/2023 335    • nRBC 03/27/2023 0    • Neutrophils Relative 03/27/2023 64    • Immat GRANS % 03/27/2023 0    • Lymphocytes Relative 03/27/2023 24    • Monocytes Relative 03/27/2023 8    • Eosinophils Relative 03/27/2023 3    • Basophils Relative 03/27/2023 1    • Neutrophils Absolute 03/27/2023 3.73    • Immature Grans Absolute 03/27/2023 0.01    • Lymphocytes Absolute 03/27/2023 1.40    • Monocytes Absolute 03/27/2023 0.47    • Eosinophils Absolute 03/27/2023 0.18    • Basophils Absolute 03/27/2023 0.07    • Sodium 03/27/2023 138    • Potassium 03/27/2023 4.1    • Chloride 03/27/2023 108    • CO2 03/27/2023 27    • ANION GAP 03/27/2023 3 (L)    • BUN 03/27/2023 22    • Creatinine 03/27/2023 0.74    • Glucose, Fasting 03/27/2023 92    • Calcium 03/27/2023 9.8    • AST 03/27/2023 20    • ALT 03/27/2023 31    • Alkaline Phosphatase 03/27/2023 74    • Total Protein 03/27/2023 7.6    • Albumin 03/27/2023 3.9    • Total Bilirubin 03/27/2023 0.43    • eGFR 03/27/2023 86    • Hemoglobin A1C 03/27/2023 6.0 (H)    • EAG 03/27/2023 126    • Cholesterol 03/27/2023 194    • Triglycerides 03/27/2023 109    • HDL, Direct 03/27/2023 69    • LDL Calculated 03/27/2023 103 (H)    • Non-HDL-Chol (CHOL-HDL) 03/27/2023 125    • TSH 3RD GENERATON 03/27/2023 0.801    • Vitamin B-12 03/27/2023 736    • Vit D, 25-Hydroxy 03/27/2023 39.6          Radiology Results:   No results found.    Israel Stern PA-C

## 2024-02-13 ENCOUNTER — ANESTHESIA (OUTPATIENT)
Dept: ANESTHESIOLOGY | Facility: HOSPITAL | Age: 64
End: 2024-02-13

## 2024-02-13 ENCOUNTER — ANESTHESIA EVENT (OUTPATIENT)
Dept: ANESTHESIOLOGY | Facility: HOSPITAL | Age: 64
End: 2024-02-13

## 2024-02-26 ENCOUNTER — ANESTHESIA (OUTPATIENT)
Dept: ANESTHESIOLOGY | Facility: HOSPITAL | Age: 64
End: 2024-02-26

## 2024-02-26 ENCOUNTER — ANESTHESIA EVENT (OUTPATIENT)
Dept: ANESTHESIOLOGY | Facility: HOSPITAL | Age: 64
End: 2024-02-26

## 2024-02-26 NOTE — ANESTHESIA PREPROCEDURE EVALUATION
Procedure:  PRE-OP ONLY    Relevant Problems   CARDIO   (+) Essential hypertension   (+) Other hyperlipidemia      GI/HEPATIC   (+) GERD without esophagitis   (+) Hiatal hernia      MUSCULOSKELETAL   (+) Hiatal hernia        Physical Exam    Airway    Mallampati score: II  TM Distance: >3 FB  Neck ROM: full     Dental   No notable dental hx     Cardiovascular  Cardiovascular exam normal    Pulmonary  Pulmonary exam normal     Other Findings  post-pubertal.    Normal sinus rhythm  Possible Left atrial enlargement         Anesthesia Plan  ASA Score- 2     Anesthesia Type- IV sedation with anesthesia with ASA Monitors.         Additional Monitors:     Airway Plan:            Plan Factors-Exercise tolerance (METS): >4 METS.    Chart reviewed. EKG reviewed. Imaging results reviewed. Existing labs reviewed. Patient summary reviewed.    Patient is not a current smoker.      There is medical exclusion for perioperative obstructive sleep apnea risk education.        Induction- intravenous.    Postoperative Plan-     Informed Consent- Anesthetic plan and risks discussed with patient.  I personally reviewed this patient with the CRNA. Discussed and agreed on the Anesthesia Plan with the CRNA..

## 2024-02-27 ENCOUNTER — ANESTHESIA EVENT (OUTPATIENT)
Dept: GASTROENTEROLOGY | Facility: AMBULARY SURGERY CENTER | Age: 64
End: 2024-02-27

## 2024-02-27 ENCOUNTER — ANESTHESIA (OUTPATIENT)
Dept: GASTROENTEROLOGY | Facility: AMBULARY SURGERY CENTER | Age: 64
End: 2024-02-27

## 2024-02-27 ENCOUNTER — HOSPITAL ENCOUNTER (OUTPATIENT)
Dept: GASTROENTEROLOGY | Facility: AMBULARY SURGERY CENTER | Age: 64
Setting detail: OUTPATIENT SURGERY
Discharge: HOME | End: 2024-02-27
Payer: COMMERCIAL

## 2024-02-27 VITALS
OXYGEN SATURATION: 100 % | RESPIRATION RATE: 17 BRPM | HEART RATE: 54 BPM | SYSTOLIC BLOOD PRESSURE: 122 MMHG | BODY MASS INDEX: 25.95 KG/M2 | HEIGHT: 62 IN | TEMPERATURE: 97.8 F | WEIGHT: 141 LBS | DIASTOLIC BLOOD PRESSURE: 72 MMHG

## 2024-02-27 DIAGNOSIS — Z12.11 SCREEN FOR COLON CANCER: ICD-10-CM

## 2024-02-27 PROCEDURE — 88305 TISSUE EXAM BY PATHOLOGIST: CPT | Performed by: PATHOLOGY

## 2024-02-27 PROCEDURE — 45380 COLONOSCOPY AND BIOPSY: CPT | Performed by: INTERNAL MEDICINE

## 2024-02-27 RX ORDER — BIOTIN 1 MG
1000 TABLET ORAL DAILY
COMMUNITY

## 2024-02-27 RX ORDER — MULTIVITAMIN
TABLET ORAL
COMMUNITY

## 2024-02-27 RX ORDER — PROPOFOL 10 MG/ML
INJECTION, EMULSION INTRAVENOUS AS NEEDED
Status: DISCONTINUED | OUTPATIENT
Start: 2024-02-27 | End: 2024-02-27

## 2024-02-27 RX ORDER — PROPOFOL 10 MG/ML
INJECTION, EMULSION INTRAVENOUS CONTINUOUS PRN
Status: DISCONTINUED | OUTPATIENT
Start: 2024-02-27 | End: 2024-02-27

## 2024-02-27 RX ORDER — SODIUM CHLORIDE, SODIUM LACTATE, POTASSIUM CHLORIDE, CALCIUM CHLORIDE 600; 310; 30; 20 MG/100ML; MG/100ML; MG/100ML; MG/100ML
INJECTION, SOLUTION INTRAVENOUS CONTINUOUS PRN
Status: DISCONTINUED | OUTPATIENT
Start: 2024-02-27 | End: 2024-02-27

## 2024-02-27 RX ADMIN — SODIUM CHLORIDE, SODIUM LACTATE, POTASSIUM CHLORIDE, AND CALCIUM CHLORIDE: .6; .31; .03; .02 INJECTION, SOLUTION INTRAVENOUS at 11:21

## 2024-02-27 RX ADMIN — PROPOFOL 100 MG: 10 INJECTION, EMULSION INTRAVENOUS at 11:26

## 2024-02-27 RX ADMIN — PROPOFOL 150 MCG/KG/MIN: 10 INJECTION, EMULSION INTRAVENOUS at 11:26

## 2024-02-27 NOTE — INTERVAL H&P NOTE
H&P reviewed. After examining the patient I find no changes in the patients condition since the H&P had been written.    Vitals:    02/27/24 1030   BP: 133/76   Pulse: 63   Resp: 16   Temp: 97.6 °F (36.4 °C)   SpO2: 99%

## 2024-02-27 NOTE — ANESTHESIA PREPROCEDURE EVALUATION
Procedure:  COLONOSCOPY    Relevant Problems   ANESTHESIA (within normal limits)      CARDIO   (+) Essential hypertension   (+) Other hyperlipidemia      ENDO (within normal limits)      GI/HEPATIC   (+) GERD without esophagitis   (+) Hiatal hernia      /RENAL (within normal limits)      HEMATOLOGY (within normal limits)      MUSCULOSKELETAL   (+) Hiatal hernia      NEURO/PSYCH (within normal limits)      PULMONARY (within normal limits)      Other   (+) Prediabetes      Lisinopril last yesterday    EKG 2/2022:  Normal sinus rhythm  Possible Left atrial enlargement    Physical Exam    Airway    Mallampati score: II  TM Distance: >3 FB  Neck ROM: full     Dental   No notable dental hx     Cardiovascular  Cardiovascular exam normal    Pulmonary  Pulmonary exam normal     Other Findings  post-pubertal.      Anesthesia Plan  ASA Score- 2     Anesthesia Type- IV sedation with anesthesia with ASA Monitors.         Additional Monitors:     Airway Plan:            Plan Factors-Exercise tolerance (METS): >4 METS.    Chart reviewed. EKG reviewed.  Existing labs reviewed. Patient summary reviewed.    Patient is not a current smoker.              Induction- intravenous.    Postoperative Plan-     Informed Consent- Anesthetic plan and risks discussed with patient.  I personally reviewed this patient with the CRNA. Discussed and agreed on the Anesthesia Plan with the CRNA..

## 2024-02-27 NOTE — ANESTHESIA POSTPROCEDURE EVALUATION
Post-Op Assessment Note    CV Status:  Stable  Pain Score: 0    Pain management: adequate       Mental Status:  Alert and awake   Hydration Status:  Stable   PONV Controlled:  None   Airway Patency:  Patent     Post Op Vitals Reviewed: Yes    No anethesia notable event occurred.    Staff: CRNA               BP (!) 84/50 (02/27/24 1148)    Temp 97.8 °F (36.6 °C) (02/27/24 1148)    Pulse 60 (02/27/24 1148)   Resp 17 (02/27/24 1148)    SpO2 96 % (02/27/24 1148)

## 2024-02-29 PROCEDURE — 88305 TISSUE EXAM BY PATHOLOGIST: CPT | Performed by: PATHOLOGY

## 2024-03-22 DIAGNOSIS — E78.2 MIXED HYPERLIPIDEMIA: ICD-10-CM

## 2024-03-22 RX ORDER — ATORVASTATIN CALCIUM 40 MG/1
40 TABLET, FILM COATED ORAL DAILY
Qty: 90 TABLET | Refills: 1 | Status: SHIPPED | OUTPATIENT
Start: 2024-03-22

## 2024-04-17 DIAGNOSIS — I10 ESSENTIAL HYPERTENSION: ICD-10-CM

## 2024-04-17 RX ORDER — LISINOPRIL 10 MG/1
TABLET ORAL
Qty: 90 TABLET | Refills: 0 | Status: SHIPPED | OUTPATIENT
Start: 2024-04-17

## 2024-07-09 ENCOUNTER — TELEPHONE (OUTPATIENT)
Dept: INTERNAL MEDICINE CLINIC | Facility: CLINIC | Age: 64
End: 2024-07-09

## 2024-07-09 NOTE — TELEPHONE ENCOUNTER
Patient called the RX Refill Line. Message is being forwarded to the office.     Patient is requesting   phentermine (ADIPEX-P) 37.5 MG tablet   No longer on active med list.    Please contact patient at : [phone]

## 2024-07-09 NOTE — TELEPHONE ENCOUNTER
Please call patient.  I can refill phentermine, needs to schedule annual physical. Last seen 3/23.

## 2024-07-10 DIAGNOSIS — E66.09 CLASS 1 OBESITY DUE TO EXCESS CALORIES WITHOUT SERIOUS COMORBIDITY WITH BODY MASS INDEX (BMI) OF 30.0 TO 30.9 IN ADULT: ICD-10-CM

## 2024-07-10 DIAGNOSIS — R23.2 HOT FLASHES: ICD-10-CM

## 2024-07-10 RX ORDER — PAROXETINE 10 MG/1
TABLET, FILM COATED ORAL
Qty: 90 TABLET | Refills: 0 | Status: SHIPPED | OUTPATIENT
Start: 2024-07-10

## 2024-07-10 NOTE — TELEPHONE ENCOUNTER
Patient is calling back for an update on the scripts. Is Dr. Kim able to place the order for phentermine (ADIPEX-P) 37.5 MG tablet? Please follow up with patient.

## 2024-07-11 RX ORDER — PHENTERMINE HYDROCHLORIDE 37.5 MG/1
18.75 TABLET ORAL 2 TIMES DAILY
Qty: 30 TABLET | Refills: 0 | Status: SHIPPED | OUTPATIENT
Start: 2024-07-11

## 2024-07-13 DIAGNOSIS — I10 ESSENTIAL HYPERTENSION: ICD-10-CM

## 2024-07-14 RX ORDER — LISINOPRIL 10 MG/1
TABLET ORAL
Qty: 90 TABLET | Refills: 0 | Status: SHIPPED | OUTPATIENT
Start: 2024-07-14

## 2024-08-27 DIAGNOSIS — E66.09 CLASS 1 OBESITY DUE TO EXCESS CALORIES WITHOUT SERIOUS COMORBIDITY WITH BODY MASS INDEX (BMI) OF 30.0 TO 30.9 IN ADULT: ICD-10-CM

## 2024-08-28 RX ORDER — TOPIRAMATE 50 MG/1
TABLET, FILM COATED ORAL
Qty: 90 TABLET | Refills: 0 | Status: SHIPPED | OUTPATIENT
Start: 2024-08-28

## 2024-09-13 ENCOUNTER — OFFICE VISIT (OUTPATIENT)
Dept: INTERNAL MEDICINE CLINIC | Facility: CLINIC | Age: 64
End: 2024-09-13
Payer: COMMERCIAL

## 2024-09-13 VITALS
DIASTOLIC BLOOD PRESSURE: 66 MMHG | WEIGHT: 154.4 LBS | HEART RATE: 66 BPM | TEMPERATURE: 96.5 F | OXYGEN SATURATION: 97 % | BODY MASS INDEX: 28.41 KG/M2 | SYSTOLIC BLOOD PRESSURE: 118 MMHG | HEIGHT: 62 IN

## 2024-09-13 DIAGNOSIS — F41.9 ANXIETY: ICD-10-CM

## 2024-09-13 DIAGNOSIS — K21.9 GERD WITHOUT ESOPHAGITIS: ICD-10-CM

## 2024-09-13 DIAGNOSIS — E78.49 OTHER HYPERLIPIDEMIA: ICD-10-CM

## 2024-09-13 DIAGNOSIS — E55.9 VITAMIN D DEFICIENCY: ICD-10-CM

## 2024-09-13 DIAGNOSIS — E66.3 OVERWEIGHT (BMI 25.0-29.9): Primary | ICD-10-CM

## 2024-09-13 DIAGNOSIS — Z12.31 ENCOUNTER FOR SCREENING MAMMOGRAM FOR BREAST CANCER: ICD-10-CM

## 2024-09-13 DIAGNOSIS — R73.03 PREDIABETES: ICD-10-CM

## 2024-09-13 DIAGNOSIS — J30.2 SEASONAL ALLERGIES: ICD-10-CM

## 2024-09-13 DIAGNOSIS — I10 ESSENTIAL HYPERTENSION: ICD-10-CM

## 2024-09-13 DIAGNOSIS — R23.2 HOT FLASHES: ICD-10-CM

## 2024-09-13 DIAGNOSIS — R22.32 MASS OF LEFT UPPER EXTREMITY: ICD-10-CM

## 2024-09-13 DIAGNOSIS — Z00.00 HEALTH MAINTENANCE EXAMINATION: ICD-10-CM

## 2024-09-13 DIAGNOSIS — E66.09 CLASS 1 OBESITY DUE TO EXCESS CALORIES WITHOUT SERIOUS COMORBIDITY WITH BODY MASS INDEX (BMI) OF 30.0 TO 30.9 IN ADULT: ICD-10-CM

## 2024-09-13 PROCEDURE — 99214 OFFICE O/P EST MOD 30 MIN: CPT | Performed by: INTERNAL MEDICINE

## 2024-09-13 PROCEDURE — 99396 PREV VISIT EST AGE 40-64: CPT | Performed by: INTERNAL MEDICINE

## 2024-09-13 RX ORDER — PHENTERMINE HYDROCHLORIDE 37.5 MG/1
18.75 TABLET ORAL 2 TIMES DAILY
Qty: 30 TABLET | Refills: 0 | Status: SHIPPED | OUTPATIENT
Start: 2024-09-13

## 2024-09-13 RX ORDER — PAROXETINE 20 MG/1
20 TABLET, FILM COATED ORAL DAILY
Qty: 90 TABLET | Refills: 1 | Status: SHIPPED | OUTPATIENT
Start: 2024-09-13

## 2024-09-13 RX ORDER — FLUTICASONE PROPIONATE 50 MCG
1 SPRAY, SUSPENSION (ML) NASAL DAILY
Qty: 16 ML | Refills: 1 | Status: SHIPPED | OUTPATIENT
Start: 2024-09-13

## 2024-09-13 NOTE — ASSESSMENT & PLAN NOTE
On paroxetine.    Orders:    PARoxetine (PAXIL) 20 mg tablet; Take 1 tablet (20 mg total) by mouth daily

## 2024-09-13 NOTE — ASSESSMENT & PLAN NOTE
Lost 20 lbs since 1.5 years ago, would like to lose another 10 lbs.  Requests for phentermine. Explained I will only give a 6 month prescription.  Continue topiramate for now.  Recommend to eat 2 to 3 regular meals, discussed importance of protein.  Start resistance exercises, lift weights.

## 2024-09-13 NOTE — ASSESSMENT & PLAN NOTE
Lipids due, taking atorvastatin 40 mg daily.    Orders:    Comprehensive metabolic panel; Future    Lipid panel; Future

## 2024-09-13 NOTE — ASSESSMENT & PLAN NOTE
Taking PPI daily. Reviewed low acid diet, avoid eating too late in the evening.  Discussed GI referral.

## 2024-09-13 NOTE — PROGRESS NOTES
Ambulatory Visit  Name: Mary Kitchen      : 1960      MRN: [id number]  Encounter Provider: Salina Kim MD  Encounter Date: 2024   Encounter department: [facility] INTERNAL MEDICINE    Assessment & Plan  Anxiety  Increase paroxetine to 20 mg.         Overweight (BMI 25.0-29.9)  Lost 20 lbs since 1.5 years ago, would like to lose another 10 lbs.  Requests for phentermine. Explained I will only give a 6 month prescription.  Continue topiramate for now.  Recommend to eat 2 to 3 regular meals, discussed importance of protein.  Start resistance exercises, lift weights.         Essential hypertension  BP stable, on lisinopril 10 mg daily.    Orders:    CBC and differential; Future    Other hyperlipidemia  Lipids due, taking atorvastatin 40 mg daily.    Orders:    Comprehensive metabolic panel; Future    Lipid panel; Future    Prediabetes  Repeat A1c due.    Orders:    Hemoglobin A1C; Future    Seasonal allergies  Start Flonase daily.    Orders:    fluticasone (FLONASE) 50 mcg/act nasal spray; 1 spray into each nostril daily    Vitamin D deficiency  Not taking D3.         GERD without esophagitis  Taking PPI daily. Reviewed low acid diet, avoid eating too late in the evening.  Discussed GI referral.         Mass of left upper extremity  Suspect lipoma. Requests for ultrasound.    Orders:    US extremity soft tissue; Future    Encounter for screening mammogram for breast cancer    Orders:    Mammo screening bilateral w 3d and cad; Future    Hot flashes  On paroxetine.    Orders:    PARoxetine (PAXIL) 20 mg tablet; Take 1 tablet (20 mg total) by mouth daily    Class 1 obesity due to excess calories without serious comorbidity with body mass index (BMI) of 30.0 to 30.9 in adult    Orders:    phentermine (ADIPEX-P) 37.5 MG tablet; Take 0.5 tablets (18.75 mg total) by mouth 2 (two) times a day    Health maintenance examination  Mammogram due, colonoscopy updated.         Follow up in 1 year or as  "needed.    History of Present Illness     She noticed a bump on her left forearm about a month ago.  No known injury, no pain. She noticed a second bump near her elbow recently. She is worries it is a mass, asking if it should be biopsied.    She is upset about her weight. She wants to lose another 10 lbs. She thinks she gained weight from last year. She is upset that her phentermine was not refilled. She is requesting for \"something stronger\" to help her lose weight. She says she is not hungry, eats one meal a day only. She swims in the pool almost daily, remains active but no other regular exercise. She feels losing 10 more lbs will make her feel good. She wants to wear a bikini and fell good.    She reports her family \"drives her crazy,\" especially her . She feels anxious all the time.        Review of Systems   Constitutional:  Negative for activity change, appetite change and fatigue.   HENT:  Positive for congestion. Negative for ear pain and postnasal drip.    Eyes:  Negative for visual disturbance.   Respiratory:  Negative for cough, shortness of breath and wheezing.    Cardiovascular:  Negative for chest pain and leg swelling.   Gastrointestinal:  Negative for abdominal pain, constipation, diarrhea and nausea.   Genitourinary:  Negative for dysuria, frequency and urgency.   Musculoskeletal:  Negative for arthralgias and myalgias.   Skin:  Negative for rash and wound.   Neurological:  Negative for dizziness and headaches.   Hematological:  Does not bruise/bleed easily.   Psychiatric/Behavioral:  Negative for confusion. The patient is nervous/anxious.            Objective     /66   Pulse 66   Temp (!) 96.5 °F (35.8 °C)   Ht 5' 2\" (1.575 m)   Wt 70 kg (154 lb 6.4 oz)   LMP 02/28/2019   SpO2 97%   BMI 28.24 kg/m²     Physical Exam  Vitals and nursing note reviewed.   Constitutional:       General: She is not in acute distress.     Appearance: She is well-developed.   HENT:      Head: " Normocephalic and atraumatic.      Right Ear: Tympanic membrane, ear canal and external ear normal.      Left Ear: Tympanic membrane, ear canal and external ear normal.      Mouth/Throat:      Mouth: Mucous membranes are moist.   Eyes:      Pupils: Pupils are equal, round, and reactive to light.   Cardiovascular:      Rate and Rhythm: Normal rate and regular rhythm.      Heart sounds: Normal heart sounds.   Pulmonary:      Effort: Pulmonary effort is normal.      Breath sounds: Normal breath sounds. No wheezing.   Abdominal:      General: Bowel sounds are normal.      Palpations: Abdomen is soft.   Musculoskeletal:         General: No swelling.      Left elbow: Normal.        Arms:       Right lower leg: No edema.      Left lower leg: No edema.   Skin:     General: Skin is warm.      Findings: No erythema or rash.   Neurological:      General: No focal deficit present.      Mental Status: She is alert and oriented to person, place, and time.   Psychiatric:         Mood and Affect: Mood and affect normal. Mood is not anxious or depressed.         Behavior: Behavior normal.           Labs & imaging results reviewed with patient.

## 2024-09-13 NOTE — ASSESSMENT & PLAN NOTE
Start Flonase daily.    Orders:    fluticasone (FLONASE) 50 mcg/act nasal spray; 1 spray into each nostril daily

## 2024-09-14 DIAGNOSIS — E78.2 MIXED HYPERLIPIDEMIA: ICD-10-CM

## 2024-09-16 RX ORDER — ATORVASTATIN CALCIUM 40 MG/1
40 TABLET, FILM COATED ORAL DAILY
Qty: 90 TABLET | Refills: 1 | Status: SHIPPED | OUTPATIENT
Start: 2024-09-16

## 2024-09-18 ENCOUNTER — APPOINTMENT (OUTPATIENT)
Dept: LAB | Facility: CLINIC | Age: 64
End: 2024-09-18
Payer: COMMERCIAL

## 2024-09-18 DIAGNOSIS — I10 ESSENTIAL HYPERTENSION: ICD-10-CM

## 2024-09-18 DIAGNOSIS — R73.03 PREDIABETES: ICD-10-CM

## 2024-09-18 DIAGNOSIS — E78.49 OTHER HYPERLIPIDEMIA: ICD-10-CM

## 2024-09-18 LAB
ALBUMIN SERPL BCG-MCNC: 4.1 G/DL (ref 3.5–5)
ALP SERPL-CCNC: 78 U/L (ref 34–104)
ALT SERPL W P-5'-P-CCNC: 20 U/L (ref 7–52)
ANION GAP SERPL CALCULATED.3IONS-SCNC: 8 MMOL/L (ref 4–13)
AST SERPL W P-5'-P-CCNC: 18 U/L (ref 13–39)
BASOPHILS # BLD AUTO: 0.05 THOUSANDS/ΜL (ref 0–0.1)
BASOPHILS NFR BLD AUTO: 1 % (ref 0–1)
BILIRUB SERPL-MCNC: 0.4 MG/DL (ref 0.2–1)
BUN SERPL-MCNC: 17 MG/DL (ref 5–25)
CALCIUM SERPL-MCNC: 9.7 MG/DL (ref 8.4–10.2)
CHLORIDE SERPL-SCNC: 108 MMOL/L (ref 96–108)
CHOLEST SERPL-MCNC: 169 MG/DL
CO2 SERPL-SCNC: 26 MMOL/L (ref 21–32)
CREAT SERPL-MCNC: 0.89 MG/DL (ref 0.6–1.3)
EOSINOPHIL # BLD AUTO: 0.15 THOUSAND/ΜL (ref 0–0.61)
EOSINOPHIL NFR BLD AUTO: 2 % (ref 0–6)
ERYTHROCYTE [DISTWIDTH] IN BLOOD BY AUTOMATED COUNT: 13.9 % (ref 11.6–15.1)
EST. AVERAGE GLUCOSE BLD GHB EST-MCNC: 126 MG/DL
GFR SERPL CREATININE-BSD FRML MDRD: 68 ML/MIN/1.73SQ M
GLUCOSE P FAST SERPL-MCNC: 95 MG/DL (ref 65–99)
HBA1C MFR BLD: 6 %
HCT VFR BLD AUTO: 42.5 % (ref 34.8–46.1)
HDLC SERPL-MCNC: 65 MG/DL
HGB BLD-MCNC: 13.6 G/DL (ref 11.5–15.4)
IMM GRANULOCYTES # BLD AUTO: 0.02 THOUSAND/UL (ref 0–0.2)
IMM GRANULOCYTES NFR BLD AUTO: 0 % (ref 0–2)
LDLC SERPL CALC-MCNC: 81 MG/DL (ref 0–100)
LYMPHOCYTES # BLD AUTO: 1.46 THOUSANDS/ΜL (ref 0.6–4.47)
LYMPHOCYTES NFR BLD AUTO: 21 % (ref 14–44)
MCH RBC QN AUTO: 29.2 PG (ref 26.8–34.3)
MCHC RBC AUTO-ENTMCNC: 32 G/DL (ref 31.4–37.4)
MCV RBC AUTO: 91 FL (ref 82–98)
MONOCYTES # BLD AUTO: 0.49 THOUSAND/ΜL (ref 0.17–1.22)
MONOCYTES NFR BLD AUTO: 7 % (ref 4–12)
NEUTROPHILS # BLD AUTO: 4.92 THOUSANDS/ΜL (ref 1.85–7.62)
NEUTS SEG NFR BLD AUTO: 69 % (ref 43–75)
NONHDLC SERPL-MCNC: 104 MG/DL
NRBC BLD AUTO-RTO: 0 /100 WBCS
PLATELET # BLD AUTO: 312 THOUSANDS/UL (ref 149–390)
PMV BLD AUTO: 9.8 FL (ref 8.9–12.7)
POTASSIUM SERPL-SCNC: 4 MMOL/L (ref 3.5–5.3)
PROT SERPL-MCNC: 7.1 G/DL (ref 6.4–8.4)
RBC # BLD AUTO: 4.65 MILLION/UL (ref 3.81–5.12)
SODIUM SERPL-SCNC: 142 MMOL/L (ref 135–147)
TRIGL SERPL-MCNC: 116 MG/DL
WBC # BLD AUTO: 7.09 THOUSAND/UL (ref 4.31–10.16)

## 2024-09-18 PROCEDURE — 36415 COLL VENOUS BLD VENIPUNCTURE: CPT

## 2024-09-18 PROCEDURE — 83036 HEMOGLOBIN GLYCOSYLATED A1C: CPT

## 2024-09-18 PROCEDURE — 80061 LIPID PANEL: CPT

## 2024-09-18 PROCEDURE — 80053 COMPREHEN METABOLIC PANEL: CPT

## 2024-09-18 PROCEDURE — 85025 COMPLETE CBC W/AUTO DIFF WBC: CPT

## 2024-09-20 ENCOUNTER — TELEPHONE (OUTPATIENT)
Dept: INTERNAL MEDICINE CLINIC | Facility: CLINIC | Age: 64
End: 2024-09-20

## 2024-09-20 NOTE — TELEPHONE ENCOUNTER
Lab results:    Sugars remain stable, still in prediabetic range.  Lipids improved, continue taking your medication.  The rest of your labs were normal.

## 2024-09-23 ENCOUNTER — HOSPITAL ENCOUNTER (OUTPATIENT)
Dept: ULTRASOUND IMAGING | Facility: MEDICAL CENTER | Age: 64
Discharge: HOME | End: 2024-09-23
Payer: COMMERCIAL

## 2024-09-23 DIAGNOSIS — R22.32 MASS OF LEFT UPPER EXTREMITY: ICD-10-CM

## 2024-09-23 PROCEDURE — 76882 US LMTD JT/FCL EVL NVASC XTR: CPT

## 2024-09-26 ENCOUNTER — TELEPHONE (OUTPATIENT)
Dept: INTERNAL MEDICINE CLINIC | Facility: CLINIC | Age: 64
End: 2024-09-26

## 2024-09-26 NOTE — TELEPHONE ENCOUNTER
Ultrasound showed lipoma, fatty tissue.  Recommend to monitor it for now, if it starts to grow in size, we can consider removing it.  If we remove it now, there is a change it may grow back.

## 2024-10-12 DIAGNOSIS — I10 ESSENTIAL HYPERTENSION: ICD-10-CM

## 2024-10-12 RX ORDER — LISINOPRIL 10 MG/1
TABLET ORAL
Qty: 90 TABLET | Refills: 1 | Status: SHIPPED | OUTPATIENT
Start: 2024-10-12

## 2024-10-13 DIAGNOSIS — E66.811 CLASS 1 OBESITY DUE TO EXCESS CALORIES WITHOUT SERIOUS COMORBIDITY WITH BODY MASS INDEX (BMI) OF 30.0 TO 30.9 IN ADULT: ICD-10-CM

## 2024-10-13 DIAGNOSIS — E66.09 CLASS 1 OBESITY DUE TO EXCESS CALORIES WITHOUT SERIOUS COMORBIDITY WITH BODY MASS INDEX (BMI) OF 30.0 TO 30.9 IN ADULT: ICD-10-CM

## 2024-10-14 RX ORDER — PHENTERMINE HYDROCHLORIDE 37.5 MG/1
18.75 TABLET ORAL 2 TIMES DAILY
Qty: 30 TABLET | Refills: 0 | Status: SHIPPED | OUTPATIENT
Start: 2024-10-14

## 2024-11-11 DIAGNOSIS — J30.2 SEASONAL ALLERGIES: ICD-10-CM

## 2024-11-12 RX ORDER — FLUTICASONE PROPIONATE 50 MCG
SPRAY, SUSPENSION (ML) NASAL
Qty: 16 ML | Refills: 5 | Status: SHIPPED | OUTPATIENT
Start: 2024-11-12

## 2024-11-15 DIAGNOSIS — E66.09 CLASS 1 OBESITY DUE TO EXCESS CALORIES WITHOUT SERIOUS COMORBIDITY WITH BODY MASS INDEX (BMI) OF 30.0 TO 30.9 IN ADULT: ICD-10-CM

## 2024-11-15 DIAGNOSIS — E66.811 CLASS 1 OBESITY DUE TO EXCESS CALORIES WITHOUT SERIOUS COMORBIDITY WITH BODY MASS INDEX (BMI) OF 30.0 TO 30.9 IN ADULT: ICD-10-CM

## 2024-11-15 RX ORDER — PHENTERMINE HYDROCHLORIDE 37.5 MG/1
TABLET ORAL
Qty: 30 TABLET | Refills: 0 | Status: SHIPPED | OUTPATIENT
Start: 2024-11-15

## 2024-12-01 DIAGNOSIS — E66.811 CLASS 1 OBESITY DUE TO EXCESS CALORIES WITHOUT SERIOUS COMORBIDITY WITH BODY MASS INDEX (BMI) OF 30.0 TO 30.9 IN ADULT: ICD-10-CM

## 2024-12-01 DIAGNOSIS — E66.09 CLASS 1 OBESITY DUE TO EXCESS CALORIES WITHOUT SERIOUS COMORBIDITY WITH BODY MASS INDEX (BMI) OF 30.0 TO 30.9 IN ADULT: ICD-10-CM

## 2024-12-03 RX ORDER — TOPIRAMATE 50 MG/1
50 TABLET, FILM COATED ORAL DAILY
Qty: 90 TABLET | Refills: 1 | Status: SHIPPED | OUTPATIENT
Start: 2024-12-03

## 2024-12-19 ENCOUNTER — HOSPITAL ENCOUNTER (OUTPATIENT)
Dept: MAMMOGRAPHY | Facility: MEDICAL CENTER | Age: 64
Discharge: HOME | End: 2024-12-19
Payer: COMMERCIAL

## 2024-12-19 VITALS — WEIGHT: 154 LBS | BODY MASS INDEX: 28.34 KG/M2 | HEIGHT: 62 IN

## 2024-12-19 DIAGNOSIS — Z12.31 ENCOUNTER FOR SCREENING MAMMOGRAM FOR BREAST CANCER: ICD-10-CM

## 2024-12-19 PROCEDURE — 77067 SCR MAMMO BI INCL CAD: CPT

## 2024-12-19 PROCEDURE — 77063 BREAST TOMOSYNTHESIS BI: CPT

## 2025-01-13 DIAGNOSIS — E66.09 CLASS 1 OBESITY DUE TO EXCESS CALORIES WITHOUT SERIOUS COMORBIDITY WITH BODY MASS INDEX (BMI) OF 30.0 TO 30.9 IN ADULT: ICD-10-CM

## 2025-01-13 DIAGNOSIS — E66.811 CLASS 1 OBESITY DUE TO EXCESS CALORIES WITHOUT SERIOUS COMORBIDITY WITH BODY MASS INDEX (BMI) OF 30.0 TO 30.9 IN ADULT: ICD-10-CM

## 2025-01-14 RX ORDER — PHENTERMINE HYDROCHLORIDE 37.5 MG/1
TABLET ORAL
Qty: 30 TABLET | Refills: 0 | Status: SHIPPED | OUTPATIENT
Start: 2025-01-14

## 2025-03-03 DIAGNOSIS — R23.2 HOT FLASHES: ICD-10-CM

## 2025-03-04 DIAGNOSIS — E66.811 CLASS 1 OBESITY DUE TO EXCESS CALORIES WITHOUT SERIOUS COMORBIDITY WITH BODY MASS INDEX (BMI) OF 30.0 TO 30.9 IN ADULT: ICD-10-CM

## 2025-03-04 DIAGNOSIS — E66.09 CLASS 1 OBESITY DUE TO EXCESS CALORIES WITHOUT SERIOUS COMORBIDITY WITH BODY MASS INDEX (BMI) OF 30.0 TO 30.9 IN ADULT: ICD-10-CM

## 2025-03-04 RX ORDER — PHENTERMINE HYDROCHLORIDE 37.5 MG/1
TABLET ORAL
Qty: 30 TABLET | Refills: 0 | Status: SHIPPED | OUTPATIENT
Start: 2025-03-04

## 2025-03-04 RX ORDER — PAROXETINE 20 MG/1
20 TABLET, FILM COATED ORAL DAILY
Qty: 90 TABLET | Refills: 0 | Status: SHIPPED | OUTPATIENT
Start: 2025-03-04

## 2025-03-17 ENCOUNTER — NURSE TRIAGE (OUTPATIENT)
Age: 65
End: 2025-03-17

## 2025-03-17 NOTE — TELEPHONE ENCOUNTER
As per Mary, she has not been doing anything repetitive and there was no injury.  She did say that er fingers are very swollen.  She is aware that the x-rays showed arthritis.

## 2025-03-17 NOTE — TELEPHONE ENCOUNTER
Please call patient.  Ask if she has been doing something more often, something repetitive the last 2 weeks or so.  Any injury or swelling?    Reviewed previous hand x-rays which showed arthritis.

## 2025-03-17 NOTE — TELEPHONE ENCOUNTER
"FOLLOW UP: Please consider patient for same day OV 3/18/25 with PCP. Please reach out to patient with provider response.     REASON FOR CONVERSATION: Hand Problem    SYMPTOMS: Patient reports for past 2-2.5 weeks she has had right hand stiffness (did see orthopedist in past for this and was dx with arthritis). Also reports some swelling, intermittent numbness and tingling in hands. Patient also states the other day she had tremors in her right hand and had to hold her hand to be able to write with a pen. States she has also experienced right leg stiffness once or twice. No neurological deficits.     OTHER: No OV available today. Please consider for same day appointment 3/28/25    DISPOSITION: See Today or Tomorrow in Office      Reason for Disposition   Numbness (i.e., loss of sensation) in hand or fingers    Answer Assessment - Initial Assessment Questions  1. ONSET: \"When did the pain start?\"      Reports about 2-2.5 weeks ago  2. LOCATION: \"Where is the pain located?\"      Right hand pain, numbness and tingling at times in the fingers   3. PAIN: \"How bad is the pain?\" (Scale 1-10; or mild, moderate, severe)      Arthritis flare up causes pain   4. WORK OR EXERCISE: \"Has there been any recent work or exercise that involved this part (i.e., hand or wrist) of the body?\"      Twirls baton  5. CAUSE: \"What do you think is causing the pain?\"      Unsure -had issues in past with osteoarthritis   6. AGGRAVATING FACTORS: \"What makes the pain worse?\" (e.g., using computer)      Worse with stress it seems,   7. OTHER SYMPTOMS: \"Do you have any other symptoms?\" (e.g., fever, neck pain, numbness or tingling, rash, swelling)      Tremors/shakiness in right hand, had to hold pen to be able to write, tingling in fingers, muscle stiffness in hand, also reports occasional stiffness in right leg   8. PREGNANCY: \"Is there any chance you are pregnant?\" \"When was your last menstrual period?\"      NA    Protocols used: Hand " Pain-Adult-[place]

## 2025-03-17 NOTE — TELEPHONE ENCOUNTER
Please ask if just the right fingers are swollen, or also having pain and swelling of the left hand.  Can offer appt with me or Sandra this week or next

## 2025-03-18 ENCOUNTER — TELEPHONE (OUTPATIENT)
Age: 65
End: 2025-03-18

## 2025-03-18 ENCOUNTER — OFFICE VISIT (OUTPATIENT)
Dept: INTERNAL MEDICINE CLINIC | Facility: CLINIC | Age: 65
End: 2025-03-18
Payer: MEDICARE

## 2025-03-18 VITALS
RESPIRATION RATE: 14 BRPM | DIASTOLIC BLOOD PRESSURE: 100 MMHG | TEMPERATURE: 97.4 F | WEIGHT: 150 LBS | HEIGHT: 62 IN | SYSTOLIC BLOOD PRESSURE: 160 MMHG | HEART RATE: 86 BPM | OXYGEN SATURATION: 98 % | BODY MASS INDEX: 27.6 KG/M2

## 2025-03-18 DIAGNOSIS — I10 ESSENTIAL HYPERTENSION: ICD-10-CM

## 2025-03-18 DIAGNOSIS — M79.641 RIGHT HAND PAIN: Primary | ICD-10-CM

## 2025-03-18 DIAGNOSIS — E78.49 OTHER HYPERLIPIDEMIA: ICD-10-CM

## 2025-03-18 DIAGNOSIS — E55.9 VITAMIN D DEFICIENCY: ICD-10-CM

## 2025-03-18 DIAGNOSIS — R73.03 PREDIABETES: ICD-10-CM

## 2025-03-18 PROCEDURE — G2211 COMPLEX E/M VISIT ADD ON: HCPCS | Performed by: INTERNAL MEDICINE

## 2025-03-18 PROCEDURE — 99213 OFFICE O/P EST LOW 20 MIN: CPT | Performed by: INTERNAL MEDICINE

## 2025-03-18 NOTE — ASSESSMENT & PLAN NOTE
Orders:  •  Comprehensive metabolic panel; Future  •  TSH, 3rd generation with Free T4 reflex; Future  •  Lipid panel; Future

## 2025-03-18 NOTE — TELEPHONE ENCOUNTER
Look for wrist brace for carpal tunnel. That will support your wrist.  Do not recommend a brace to cover fingers. 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

## 2025-03-18 NOTE — PROGRESS NOTES
Name: Mary Kitchen      : 1960      MRN: [id number]  Encounter Provider: Salina Kim MD  Encounter Date: 3/18/2025   Encounter department: [facility] INTERNAL MEDICINE  :  Assessment & Plan  Right hand pain  Differential Dx: overuse, OA, carpal tunnel, less likely RA.  Recommend to rest hand (no twirling / use of baton), ice hand every night. May take Tylenol daily, NSAID for severe pain only with food.  May try using wrist brace qHS.  Discussed RA symptoms, blood work.       Other hyperlipidemia    Orders:  •  Comprehensive metabolic panel; Future  •  TSH, 3rd generation with Free T4 reflex; Future  •  Lipid panel; Future    Essential hypertension  Monitor BP, elevated today.  On lisinopril 10 mg daily.  Orders:  •  CBC and differential; Future    Vitamin D deficiency    Orders:  •  Vitamin D 25 hydroxy; Future    Prediabetes    Orders:  •  Hemoglobin A1C; Future    Follow up as scheduled or as needed.       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  She thinks she would twirl for about an hour each time.  She is right-handed.  In the last 2 weeks or so, she noticed swelling of her right hand. She feels it is sore, stiff in the morning. No injury. She has some swelling of the fingers of her left hand also but not as bad as the right hand. She took Aleve which seemed to help.  She reports her right hand would shake when writing, able to do other activities with no issues.   She had an episode of feeling very stiff on her right side, body and legs, when she woke up one morning. This has since resolved and has not recurred.    She is worried about Parkinson's. She is asking about carpal tunnel. She has occasional tingling of all her fingers, not sure when it would occur.  She feels tiff when she wakes up in the morning, improves  "later in the day.      Review of Systems   Musculoskeletal:  Positive for arthralgias and joint swelling. Negative for back pain, myalgias and neck stiffness.   Neurological:  Positive for weakness and numbness. Negative for light-headedness.   Psychiatric/Behavioral:  The patient is nervous/anxious.        Objective   /100 (BP Location: Left arm, Patient Position: Sitting, Cuff Size: Standard)   Pulse 86   Temp (!) 97.4 °F (36.3 °C)   Resp 14   Ht 5' 2\" (1.575 m)   Wt 68 kg (150 lb)   LMP 02/28/2019   SpO2 98%   BMI 27.44 kg/m²      Physical Exam  Constitutional:       General: She is not in acute distress.     Appearance: Normal appearance. She is not ill-appearing.   Pulmonary:      Effort: Pulmonary effort is normal. No respiratory distress.   Musculoskeletal:      Right elbow: Normal.      Left elbow: Normal.      Right forearm: Normal.      Left forearm: Normal.      Right wrist: Normal. No swelling.      Left wrist: Normal. No swelling.      Right hand: Swelling and deformity present. No tenderness or bony tenderness. Normal sensation.      Left hand: Swelling and deformity present. No tenderness or bony tenderness. Normal sensation.      Comments: (+) swelling MCP, PIP, DIP joints 2nd to 4th digits R hand and 2nd and 3rd digits L hand   Neurological:      General: No focal deficit present.      Mental Status: She is alert and oriented to person, place, and time.   Psychiatric:         Mood and Affect: Mood is anxious.         Behavior: Behavior normal.         "

## 2025-03-18 NOTE — TELEPHONE ENCOUNTER
The patient called to ask what type of hands brace she needs to buy     If the one that cover her finger as well     Please advice

## 2025-03-22 DIAGNOSIS — E78.2 MIXED HYPERLIPIDEMIA: ICD-10-CM

## 2025-03-22 RX ORDER — ATORVASTATIN CALCIUM 40 MG/1
40 TABLET, FILM COATED ORAL DAILY
Qty: 90 TABLET | Refills: 1 | Status: SHIPPED | OUTPATIENT
Start: 2025-03-22

## 2025-04-04 DIAGNOSIS — I10 ESSENTIAL HYPERTENSION: ICD-10-CM

## 2025-04-04 RX ORDER — LISINOPRIL 10 MG/1
10 TABLET ORAL DAILY
Qty: 90 TABLET | Refills: 1 | Status: SHIPPED | OUTPATIENT
Start: 2025-04-04

## 2025-04-12 DIAGNOSIS — E66.09 CLASS 1 OBESITY DUE TO EXCESS CALORIES WITHOUT SERIOUS COMORBIDITY WITH BODY MASS INDEX (BMI) OF 30.0 TO 30.9 IN ADULT: ICD-10-CM

## 2025-04-12 DIAGNOSIS — E66.811 CLASS 1 OBESITY DUE TO EXCESS CALORIES WITHOUT SERIOUS COMORBIDITY WITH BODY MASS INDEX (BMI) OF 30.0 TO 30.9 IN ADULT: ICD-10-CM

## 2025-04-14 RX ORDER — PHENTERMINE HYDROCHLORIDE 37.5 MG/1
TABLET ORAL
Qty: 30 TABLET | Refills: 0 | OUTPATIENT
Start: 2025-04-14

## 2025-05-30 DIAGNOSIS — E66.09 CLASS 1 OBESITY DUE TO EXCESS CALORIES WITHOUT SERIOUS COMORBIDITY WITH BODY MASS INDEX (BMI) OF 30.0 TO 30.9 IN ADULT: ICD-10-CM

## 2025-05-30 DIAGNOSIS — E66.811 CLASS 1 OBESITY DUE TO EXCESS CALORIES WITHOUT SERIOUS COMORBIDITY WITH BODY MASS INDEX (BMI) OF 30.0 TO 30.9 IN ADULT: ICD-10-CM

## 2025-05-31 DIAGNOSIS — R23.2 HOT FLASHES: ICD-10-CM

## 2025-05-31 RX ORDER — TOPIRAMATE 50 MG/1
50 TABLET, FILM COATED ORAL DAILY
Qty: 90 TABLET | Refills: 1 | Status: SHIPPED | OUTPATIENT
Start: 2025-05-31

## 2025-06-01 RX ORDER — PAROXETINE 20 MG/1
20 TABLET, FILM COATED ORAL DAILY
Qty: 90 TABLET | Refills: 0 | Status: SHIPPED | OUTPATIENT
Start: 2025-06-01

## (undated) DEVICE — COBAN 4 IN STERILE

## (undated) DEVICE — 3M™ STERI-STRIP™ REINFORCED ADHESIVE SKIN CLOSURES, R1542, 1/4 IN X 1-1/2 IN (6 MM X 38 MM), 6 STRIPS/ENVELOPE: Brand: 3M™ STERI-STRIP™

## (undated) DEVICE — SUT MONOCRYL 4-0 PS-2 18 IN Y496G

## (undated) DEVICE — PENCIL ELECTROSURG E-Z CLEAN -0035H

## (undated) DEVICE — FIBER PROC KIT GOLD TIP 21G 45CM NEVERTOUCH

## (undated) DEVICE — ACE WRAP 4 IN UNSTERILE

## (undated) DEVICE — GAUZE SPONGES,16 PLY: Brand: CURITY

## (undated) DEVICE — DRAPE SHEET THREE QUARTER

## (undated) DEVICE — CHLORAPREP HI-LITE 26ML ORANGE

## (undated) DEVICE — KERLIX BANDAGE ROLL: Brand: KERLIX

## (undated) DEVICE — TONGUE DEPRESSOR STERILE

## (undated) DEVICE — COVER PROBE INTRAOPERATIVE 6 X 96 IN

## (undated) DEVICE — SUT SILK 3-0 18 IN A184H

## (undated) DEVICE — TIBURON SPLIT SHEET: Brand: CONVERTORS

## (undated) DEVICE — ACE WRAP 6 IN UNSTERILE

## (undated) DEVICE — 3M™ STERI-STRIP™ REINFORCED ADHESIVE SKIN CLOSURES, R1547, 1/2 IN X 4 IN (12 MM X 100 MM), 6 STRIPS/ENVELOPE: Brand: 3M™ STERI-STRIP™

## (undated) DEVICE — INTENDED FOR TISSUE SEPARATION, AND OTHER PROCEDURES THAT REQUIRE A SHARP SURGICAL BLADE TO PUNCTURE OR CUT.: Brand: BARD-PARKER SAFETY BLADES SIZE 15, STERILE

## (undated) DEVICE — Device

## (undated) DEVICE — PAD GROUNDING ADULT

## (undated) DEVICE — INTENDED FOR TISSUE SEPARATION, AND OTHER PROCEDURES THAT REQUIRE A SHARP SURGICAL BLADE TO PUNCTURE OR CUT.: Brand: BARD-PARKER SAFETY BLADES SIZE 11, STERILE

## (undated) DEVICE — 2000CC GUARDIAN II: Brand: GUARDIAN

## (undated) DEVICE — DRAPE SHEET X-LG

## (undated) DEVICE — GLOVE SRG BIOGEL 6

## (undated) DEVICE — BETHLEHEM UNIVERSAL MINOR GEN: Brand: CARDINAL HEALTH

## (undated) DEVICE — ULTRASOUND GEL STERILE FOIL PK